# Patient Record
Sex: MALE | Race: WHITE | NOT HISPANIC OR LATINO | Employment: OTHER | URBAN - METROPOLITAN AREA
[De-identification: names, ages, dates, MRNs, and addresses within clinical notes are randomized per-mention and may not be internally consistent; named-entity substitution may affect disease eponyms.]

---

## 2017-01-30 ENCOUNTER — RESOLUTE PROFESSIONAL BILLING HOSPITAL PROF FEE (OUTPATIENT)
Dept: HOSPITALIST | Facility: MEDICAL CENTER | Age: 62
End: 2017-01-30
Payer: COMMERCIAL

## 2017-01-30 ENCOUNTER — APPOINTMENT (OUTPATIENT)
Dept: RADIOLOGY | Facility: MEDICAL CENTER | Age: 62
End: 2017-01-30
Attending: EMERGENCY MEDICINE
Payer: COMMERCIAL

## 2017-01-30 ENCOUNTER — HOSPITAL ENCOUNTER (OUTPATIENT)
Facility: MEDICAL CENTER | Age: 62
End: 2017-01-31
Attending: EMERGENCY MEDICINE | Admitting: INTERNAL MEDICINE
Payer: COMMERCIAL

## 2017-01-30 DIAGNOSIS — R07.9 CHEST PAIN, UNSPECIFIED TYPE: ICD-10-CM

## 2017-01-30 PROBLEM — I25.10 CAD (CORONARY ARTERY DISEASE): Status: ACTIVE | Noted: 2017-01-30

## 2017-01-30 PROBLEM — E11.9 DM (DIABETES MELLITUS) (HCC): Status: ACTIVE | Noted: 2017-01-30

## 2017-01-30 PROBLEM — N40.0 BPH (BENIGN PROSTATIC HYPERTROPHY): Status: ACTIVE | Noted: 2017-01-30

## 2017-01-30 LAB
ALBUMIN SERPL BCP-MCNC: 4.5 G/DL (ref 3.2–4.9)
ALBUMIN/GLOB SERPL: 1.6 G/DL
ALP SERPL-CCNC: 35 U/L (ref 30–99)
ALT SERPL-CCNC: 57 U/L (ref 2–50)
ANION GAP SERPL CALC-SCNC: 10 MMOL/L (ref 0–11.9)
APTT PPP: 24 SEC (ref 24.7–36)
AST SERPL-CCNC: 25 U/L (ref 12–45)
BASOPHILS # BLD AUTO: 1.2 % (ref 0–1.8)
BASOPHILS # BLD: 0.08 K/UL (ref 0–0.12)
BILIRUB SERPL-MCNC: 0.8 MG/DL (ref 0.1–1.5)
BNP SERPL-MCNC: 20 PG/ML (ref 0–100)
BUN SERPL-MCNC: 21 MG/DL (ref 8–22)
CALCIUM SERPL-MCNC: 9.4 MG/DL (ref 8.5–10.5)
CHLORIDE SERPL-SCNC: 103 MMOL/L (ref 96–112)
CO2 SERPL-SCNC: 22 MMOL/L (ref 20–33)
CREAT SERPL-MCNC: 0.79 MG/DL (ref 0.5–1.4)
DEPRECATED D DIMER PPP IA-ACNC: <200 NG/ML(D-DU)
EKG IMPRESSION: NORMAL
EOSINOPHIL # BLD AUTO: 0.18 K/UL (ref 0–0.51)
EOSINOPHIL NFR BLD: 2.7 % (ref 0–6.9)
ERYTHROCYTE [DISTWIDTH] IN BLOOD BY AUTOMATED COUNT: 43.4 FL (ref 35.9–50)
GFR SERPL CREATININE-BSD FRML MDRD: >60 ML/MIN/1.73 M 2
GLOBULIN SER CALC-MCNC: 2.9 G/DL (ref 1.9–3.5)
GLUCOSE BLD-MCNC: 174 MG/DL (ref 65–99)
GLUCOSE BLD-MCNC: 184 MG/DL (ref 65–99)
GLUCOSE BLD-MCNC: 192 MG/DL (ref 65–99)
GLUCOSE SERPL-MCNC: 178 MG/DL (ref 65–99)
HCT VFR BLD AUTO: 45.9 % (ref 42–52)
HGB BLD-MCNC: 15.4 G/DL (ref 14–18)
IMM GRANULOCYTES # BLD AUTO: 0.04 K/UL (ref 0–0.11)
IMM GRANULOCYTES NFR BLD AUTO: 0.6 % (ref 0–0.9)
INR PPP: 0.92 (ref 0.87–1.13)
LIPASE SERPL-CCNC: 15 U/L (ref 11–82)
LYMPHOCYTES # BLD AUTO: 2.69 K/UL (ref 1–4.8)
LYMPHOCYTES NFR BLD: 40.7 % (ref 22–41)
MCH RBC QN AUTO: 29.8 PG (ref 27–33)
MCHC RBC AUTO-ENTMCNC: 33.6 G/DL (ref 33.7–35.3)
MCV RBC AUTO: 88.8 FL (ref 81.4–97.8)
MONOCYTES # BLD AUTO: 0.67 K/UL (ref 0–0.85)
MONOCYTES NFR BLD AUTO: 10.1 % (ref 0–13.4)
NEUTROPHILS # BLD AUTO: 2.95 K/UL (ref 1.82–7.42)
NEUTROPHILS NFR BLD: 44.7 % (ref 44–72)
NRBC # BLD AUTO: 0 K/UL
NRBC BLD AUTO-RTO: 0 /100 WBC
PLATELET # BLD AUTO: 220 K/UL (ref 164–446)
PMV BLD AUTO: 10.3 FL (ref 9–12.9)
POTASSIUM SERPL-SCNC: 4.2 MMOL/L (ref 3.6–5.5)
PROT SERPL-MCNC: 7.4 G/DL (ref 6–8.2)
PROTHROMBIN TIME: 12.6 SEC (ref 12–14.6)
RBC # BLD AUTO: 5.17 M/UL (ref 4.7–6.1)
SODIUM SERPL-SCNC: 135 MMOL/L (ref 135–145)
TROPONIN I SERPL-MCNC: <0.01 NG/ML (ref 0–0.04)
WBC # BLD AUTO: 6.6 K/UL (ref 4.8–10.8)

## 2017-01-30 PROCEDURE — 83690 ASSAY OF LIPASE: CPT

## 2017-01-30 PROCEDURE — 700105 HCHG RX REV CODE 258: Performed by: EMERGENCY MEDICINE

## 2017-01-30 PROCEDURE — 85730 THROMBOPLASTIN TIME PARTIAL: CPT

## 2017-01-30 PROCEDURE — A9270 NON-COVERED ITEM OR SERVICE: HCPCS | Performed by: INTERNAL MEDICINE

## 2017-01-30 PROCEDURE — 85025 COMPLETE CBC W/AUTO DIFF WBC: CPT

## 2017-01-30 PROCEDURE — 90471 IMMUNIZATION ADMIN: CPT

## 2017-01-30 PROCEDURE — 36415 COLL VENOUS BLD VENIPUNCTURE: CPT

## 2017-01-30 PROCEDURE — 84484 ASSAY OF TROPONIN QUANT: CPT | Mod: 91

## 2017-01-30 PROCEDURE — 80053 COMPREHEN METABOLIC PANEL: CPT

## 2017-01-30 PROCEDURE — 93010 ELECTROCARDIOGRAM REPORT: CPT | Performed by: INTERNAL MEDICINE

## 2017-01-30 PROCEDURE — 93005 ELECTROCARDIOGRAM TRACING: CPT | Performed by: INTERNAL MEDICINE

## 2017-01-30 PROCEDURE — A9270 NON-COVERED ITEM OR SERVICE: HCPCS | Performed by: EMERGENCY MEDICINE

## 2017-01-30 PROCEDURE — 96361 HYDRATE IV INFUSION ADD-ON: CPT

## 2017-01-30 PROCEDURE — 700102 HCHG RX REV CODE 250 W/ 637 OVERRIDE(OP): Performed by: EMERGENCY MEDICINE

## 2017-01-30 PROCEDURE — 96372 THER/PROPH/DIAG INJ SC/IM: CPT

## 2017-01-30 PROCEDURE — 93005 ELECTROCARDIOGRAM TRACING: CPT | Performed by: EMERGENCY MEDICINE

## 2017-01-30 PROCEDURE — 96376 TX/PRO/DX INJ SAME DRUG ADON: CPT

## 2017-01-30 PROCEDURE — 85379 FIBRIN DEGRADATION QUANT: CPT

## 2017-01-30 PROCEDURE — 99285 EMERGENCY DEPT VISIT HI MDM: CPT

## 2017-01-30 PROCEDURE — G0378 HOSPITAL OBSERVATION PER HR: HCPCS

## 2017-01-30 PROCEDURE — 90732 PPSV23 VACC 2 YRS+ SUBQ/IM: CPT | Performed by: INTERNAL MEDICINE

## 2017-01-30 PROCEDURE — 96374 THER/PROPH/DIAG INJ IV PUSH: CPT

## 2017-01-30 PROCEDURE — 99220 PR INITIAL OBSERVATION CARE,LEVL III: CPT | Performed by: INTERNAL MEDICINE

## 2017-01-30 PROCEDURE — 83880 ASSAY OF NATRIURETIC PEPTIDE: CPT

## 2017-01-30 PROCEDURE — 700111 HCHG RX REV CODE 636 W/ 250 OVERRIDE (IP): Performed by: INTERNAL MEDICINE

## 2017-01-30 PROCEDURE — 700102 HCHG RX REV CODE 250 W/ 637 OVERRIDE(OP): Performed by: INTERNAL MEDICINE

## 2017-01-30 PROCEDURE — 96360 HYDRATION IV INFUSION INIT: CPT

## 2017-01-30 PROCEDURE — 82962 GLUCOSE BLOOD TEST: CPT | Mod: 91

## 2017-01-30 PROCEDURE — 71010 DX-CHEST-PORTABLE (1 VIEW): CPT

## 2017-01-30 PROCEDURE — 85610 PROTHROMBIN TIME: CPT

## 2017-01-30 RX ORDER — LORAZEPAM 1 MG/1
0.5 TABLET ORAL EVERY 6 HOURS PRN
Status: DISCONTINUED | OUTPATIENT
Start: 2017-01-30 | End: 2017-01-31 | Stop reason: HOSPADM

## 2017-01-30 RX ORDER — IBUPROFEN 800 MG/1
800 TABLET ORAL
Status: DISCONTINUED | OUTPATIENT
Start: 2017-01-30 | End: 2017-01-31 | Stop reason: HOSPADM

## 2017-01-30 RX ORDER — LABETALOL HYDROCHLORIDE 5 MG/ML
10 INJECTION, SOLUTION INTRAVENOUS EVERY 4 HOURS PRN
Status: DISCONTINUED | OUTPATIENT
Start: 2017-01-30 | End: 2017-01-31 | Stop reason: HOSPADM

## 2017-01-30 RX ORDER — MORPHINE SULFATE 4 MG/ML
2-4 INJECTION, SOLUTION INTRAMUSCULAR; INTRAVENOUS
Status: DISPENSED | OUTPATIENT
Start: 2017-01-30 | End: 2017-01-31

## 2017-01-30 RX ORDER — CALCIUM CARBONATE 500 MG/1
500 TABLET, CHEWABLE ORAL 3 TIMES DAILY PRN
Status: DISCONTINUED | OUTPATIENT
Start: 2017-01-30 | End: 2017-01-31 | Stop reason: HOSPADM

## 2017-01-30 RX ORDER — ASPIRIN 300 MG/1
300 SUPPOSITORY RECTAL DAILY
Status: DISCONTINUED | OUTPATIENT
Start: 2017-01-31 | End: 2017-01-31 | Stop reason: HOSPADM

## 2017-01-30 RX ORDER — AMOXICILLIN 250 MG
1 CAPSULE ORAL
Status: DISCONTINUED | OUTPATIENT
Start: 2017-01-31 | End: 2017-01-31 | Stop reason: HOSPADM

## 2017-01-30 RX ORDER — PROMETHAZINE HYDROCHLORIDE 25 MG/1
12.5-25 TABLET ORAL EVERY 4 HOURS PRN
Status: DISCONTINUED | OUTPATIENT
Start: 2017-01-30 | End: 2017-01-31 | Stop reason: HOSPADM

## 2017-01-30 RX ORDER — M-VIT,TX,IRON,MINS/CALC/FOLIC 27MG-0.4MG
1 TABLET ORAL DAILY
Status: SHIPPED | COMMUNITY
End: 2023-12-21

## 2017-01-30 RX ORDER — ONDANSETRON 2 MG/ML
4 INJECTION INTRAMUSCULAR; INTRAVENOUS EVERY 4 HOURS PRN
Status: DISCONTINUED | OUTPATIENT
Start: 2017-01-30 | End: 2017-01-31 | Stop reason: HOSPADM

## 2017-01-30 RX ORDER — BISACODYL 10 MG
10 SUPPOSITORY, RECTAL RECTAL
Status: DISCONTINUED | OUTPATIENT
Start: 2017-01-31 | End: 2017-01-31 | Stop reason: HOSPADM

## 2017-01-30 RX ORDER — HYDROCODONE BITARTRATE AND ACETAMINOPHEN 5; 325 MG/1; MG/1
1-2 TABLET ORAL EVERY 4 HOURS PRN
COMMUNITY
End: 2019-03-08

## 2017-01-30 RX ORDER — HEPARIN SODIUM 5000 [USP'U]/ML
5000 INJECTION, SOLUTION INTRAVENOUS; SUBCUTANEOUS EVERY 8 HOURS
Status: DISCONTINUED | OUTPATIENT
Start: 2017-01-31 | End: 2017-01-31 | Stop reason: HOSPADM

## 2017-01-30 RX ORDER — ACETAMINOPHEN 325 MG/1
650 TABLET ORAL EVERY 6 HOURS PRN
Status: DISCONTINUED | OUTPATIENT
Start: 2017-01-30 | End: 2017-01-31 | Stop reason: HOSPADM

## 2017-01-30 RX ORDER — ASPIRIN 325 MG
325 TABLET ORAL DAILY
Status: DISCONTINUED | OUTPATIENT
Start: 2017-01-31 | End: 2017-01-31 | Stop reason: HOSPADM

## 2017-01-30 RX ORDER — ENEMA 19; 7 G/133ML; G/133ML
1 ENEMA RECTAL
Status: DISCONTINUED | OUTPATIENT
Start: 2017-01-31 | End: 2017-01-31 | Stop reason: HOSPADM

## 2017-01-30 RX ORDER — SODIUM CHLORIDE 9 MG/ML
INJECTION, SOLUTION INTRAVENOUS CONTINUOUS
Status: DISCONTINUED | OUTPATIENT
Start: 2017-01-30 | End: 2017-01-30

## 2017-01-30 RX ORDER — TAMSULOSIN HYDROCHLORIDE 0.4 MG/1
0.4 CAPSULE ORAL
Status: DISCONTINUED | OUTPATIENT
Start: 2017-01-30 | End: 2017-01-31 | Stop reason: HOSPADM

## 2017-01-30 RX ORDER — NAPROXEN SODIUM 220 MG
440 TABLET ORAL PRN
Status: ON HOLD | COMMUNITY
End: 2017-01-31

## 2017-01-30 RX ORDER — CHLORAL HYDRATE 500 MG
1000 CAPSULE ORAL
Status: SHIPPED | COMMUNITY
End: 2023-12-21

## 2017-01-30 RX ORDER — TAMSULOSIN HYDROCHLORIDE 0.4 MG/1
0.4 CAPSULE ORAL
COMMUNITY

## 2017-01-30 RX ORDER — ONDANSETRON 4 MG/1
4 TABLET, ORALLY DISINTEGRATING ORAL EVERY 4 HOURS PRN
Status: DISCONTINUED | OUTPATIENT
Start: 2017-01-30 | End: 2017-01-31 | Stop reason: HOSPADM

## 2017-01-30 RX ORDER — AMOXICILLIN 250 MG
1 CAPSULE ORAL NIGHTLY
Status: DISCONTINUED | OUTPATIENT
Start: 2017-01-31 | End: 2017-01-31 | Stop reason: HOSPADM

## 2017-01-30 RX ORDER — ASPIRIN 81 MG/1
324 TABLET, CHEWABLE ORAL ONCE
Status: COMPLETED | OUTPATIENT
Start: 2017-01-30 | End: 2017-01-30

## 2017-01-30 RX ORDER — ASPIRIN 81 MG/1
324 TABLET, CHEWABLE ORAL DAILY
Status: DISCONTINUED | OUTPATIENT
Start: 2017-01-31 | End: 2017-01-31 | Stop reason: HOSPADM

## 2017-01-30 RX ORDER — CALCIUM CARBONATE 500 MG/1
500 TABLET, CHEWABLE ORAL PRN
COMMUNITY

## 2017-01-30 RX ORDER — NITROGLYCERIN 0.4 MG/1
0.4 TABLET SUBLINGUAL
Status: DISCONTINUED | OUTPATIENT
Start: 2017-01-30 | End: 2017-01-31 | Stop reason: HOSPADM

## 2017-01-30 RX ORDER — LORAZEPAM 2 MG/ML
0.5 INJECTION INTRAMUSCULAR EVERY 6 HOURS PRN
Status: DISCONTINUED | OUTPATIENT
Start: 2017-01-30 | End: 2017-01-31 | Stop reason: HOSPADM

## 2017-01-30 RX ORDER — LACTULOSE 20 G/30ML
30 SOLUTION ORAL
Status: DISCONTINUED | OUTPATIENT
Start: 2017-01-31 | End: 2017-01-31 | Stop reason: HOSPADM

## 2017-01-30 RX ORDER — HYDROCODONE BITARTRATE AND ACETAMINOPHEN 5; 325 MG/1; MG/1
1-2 TABLET ORAL EVERY 4 HOURS PRN
Status: DISCONTINUED | OUTPATIENT
Start: 2017-01-30 | End: 2017-01-31 | Stop reason: HOSPADM

## 2017-01-30 RX ORDER — PROMETHAZINE HYDROCHLORIDE 25 MG/1
12.5-25 SUPPOSITORY RECTAL EVERY 4 HOURS PRN
Status: DISCONTINUED | OUTPATIENT
Start: 2017-01-30 | End: 2017-01-31 | Stop reason: HOSPADM

## 2017-01-30 RX ORDER — DOCUSATE SODIUM 100 MG/1
100 CAPSULE, LIQUID FILLED ORAL EVERY MORNING
Status: DISCONTINUED | OUTPATIENT
Start: 2017-01-31 | End: 2017-01-31 | Stop reason: HOSPADM

## 2017-01-30 RX ORDER — DEXTROSE MONOHYDRATE 25 G/50ML
25 INJECTION, SOLUTION INTRAVENOUS
Status: DISCONTINUED | OUTPATIENT
Start: 2017-01-30 | End: 2017-01-31 | Stop reason: HOSPADM

## 2017-01-30 RX ORDER — NITROGLYCERIN 0.4 MG/1
0.4 TABLET SUBLINGUAL ONCE
Status: COMPLETED | OUTPATIENT
Start: 2017-01-30 | End: 2017-01-30

## 2017-01-30 RX ADMIN — NITROGLYCERIN 0.4 MG: 0.4 TABLET SUBLINGUAL at 10:15

## 2017-01-30 RX ADMIN — ASPIRIN 324 MG: 81 TABLET, CHEWABLE ORAL at 10:15

## 2017-01-30 RX ADMIN — SODIUM CHLORIDE: 9 INJECTION, SOLUTION INTRAVENOUS at 10:17

## 2017-01-30 RX ADMIN — TAMSULOSIN HYDROCHLORIDE 0.4 MG: 0.4 CAPSULE ORAL at 20:16

## 2017-01-30 RX ADMIN — LIDOCAINE HYDROCHLORIDE 15 ML: 20 SOLUTION OROPHARYNGEAL at 13:37

## 2017-01-30 RX ADMIN — PNEUMOCOCCAL VACCINE POLYVALENT 25 MCG
25; 25; 25; 25; 25; 25; 25; 25; 25; 25; 25; 25; 25; 25; 25; 25; 25; 25; 25; 25; 25; 25; 25 INJECTION, SOLUTION INTRAMUSCULAR; SUBCUTANEOUS at 16:04

## 2017-01-30 RX ADMIN — INSULIN LISPRO 2 UNITS: 100 INJECTION, SOLUTION INTRAVENOUS; SUBCUTANEOUS at 17:31

## 2017-01-30 RX ADMIN — IBUPROFEN 800 MG: 800 TABLET, FILM COATED ORAL at 17:31

## 2017-01-30 RX ADMIN — HYDROCODONE BITARTRATE AND ACETAMINOPHEN 2 TABLET: 5; 325 TABLET ORAL at 20:29

## 2017-01-30 RX ADMIN — MORPHINE SULFATE 2 MG: 4 INJECTION INTRAVENOUS at 13:45

## 2017-01-30 RX ADMIN — MORPHINE SULFATE 4 MG: 4 INJECTION INTRAVENOUS at 21:50

## 2017-01-30 ASSESSMENT — PAIN SCALES - GENERAL
PAINLEVEL_OUTOF10: 4
PAINLEVEL_OUTOF10: 6
PAINLEVEL_OUTOF10: 6
PAINLEVEL_OUTOF10: 4
PAINLEVEL_OUTOF10: 6
PAINLEVEL_OUTOF10: 4
PAINLEVEL_OUTOF10: 0
PAINLEVEL_OUTOF10: 4
PAINLEVEL_OUTOF10: 4

## 2017-01-30 ASSESSMENT — LIFESTYLE VARIABLES
ALCOHOL_USE: NO
EVER_SMOKED: NEVER

## 2017-01-30 ASSESSMENT — PAIN SCALES - WONG BAKER
WONGBAKER_NUMERICALRESPONSE: DOESN'T HURT AT ALL
WONGBAKER_NUMERICALRESPONSE: DOESN'T HURT AT ALL

## 2017-01-30 NOTE — ED NOTES
Bed assigned to T 202.  Report given to Shoshana DINERO.  Pt given lunch box meal.  Aware no caffeine/no chocolate and NPO after midnight.

## 2017-01-30 NOTE — PROGRESS NOTES
Patient up on the floor, ambulatory. Still have some chest pain when moving around. No shortness of breath. Discuss about plan of care. Stress test tomorrow.

## 2017-01-30 NOTE — IP AVS SNAPSHOT
" After Visit Summary                                                                                                                  Name:Hossein Anderson  Medical Record Number:[id number]  CSN: [id number]    YOB: 1955   Age: 61 y.o.  Sex: male  HT:1.803 m (5' 11\") WT: 116.6 kg (257 lb 0.9 oz)          Admit Date: 1/30/2017     Discharge Date:   Today's Date: 1/31/2017  Attending Doctor:  Dorys Jaquez M.D.                  Allergies:  Codeine and Orange            Discharge Instructions       Discharge Instructions    Discharged to home by car with relative. Discharged via walking, hospital escort: Yes.  Special equipment needed: Not Applicable    Be sure to schedule a follow-up appointment with your primary care doctor or any specialists as instructed.     Discharge Plan:   Diet Plan: Discussed  Activity Level: Discussed  Confirmed Follow up Appointment: Patient to Call and Schedule Appointment  Confirmed Symptoms Management: Discussed  Medication Reconciliation Updated: Yes  Pneumococcal Vaccine Given - only chart on this line when given: Given (See MAR)  Influenza Vaccine Indication: Not indicated: Previously immunized this influenza season and > 8 years of age    I understand that a diet low in cholesterol, fat, and sodium is recommended for good health. Unless I have been given specific instructions below for another diet, I accept this instruction as my diet prescription.   Other diet: Diabetic Diet    Special Instructions: None    · Is patient discharged on Warfarin / Coumadin?   No     · Is patient Post Blood Transfusion?  No    Depression / Suicide Risk    As you are discharged from this [facility] facility, it is important to learn how to keep safe from harming yourself.    Recognize the warning signs:  · Abrupt changes in personality, positive or negative- including increase in energy   · Giving away possessions  · Change in eating patterns- significant weight changes-  positive or " negative  · Change in sleeping patterns- unable to sleep or sleeping all the time   · Unwillingness or inability to communicate  · Depression  · Unusual sadness, discouragement and loneliness  · Talk of wanting to die  · Neglect of personal appearance   · Rebelliousness- reckless behavior  · Withdrawal from people/activities they love  · Confusion- inability to concentrate     If you or a loved one observes any of these behaviors or has concerns about self-harm, here's what you can do:  · Talk about it- your feelings and reasons for harming yourself  · Remove any means that you might use to hurt yourself (examples: pills, rope, extension cords, firearm)  · Get professional help from the community (Mental Health, Substance Abuse, psychological counseling)  · Do not be alone:Call your Safe Contact- someone whom you trust who will be there for you.  · Call your local CRISIS HOTLINE 233-3269 or 792-190-8242  · Call your local Children's Mobile Crisis Response Team [place] (139) 709-0761 or wwwCodeEval  · Call the toll free National Suicide Prevention Hotlines   · National Suicide Prevention Lifeline 524-335-OHGF (3759)  · National Hope Line Network 800-SUICIDE (002-1041)        Follow-up Information     1. Schedule an appointment as soon as possible for a visit with Branden ROJAS M.D..    Specialty:  Family Medicine    Contact information    [address] [place] [address] [phone]           Discharge Medication Instructions:    Below are the medications your physician expects you to take upon discharge:    Review all your home medications and newly ordered medications with your doctor and/or pharmacist. Follow medication instructions as directed by your doctor and/or pharmacist.    Please keep your medication list with you and share with your physician.               Medication List      START taking these medications        Instructions    ibuprofen 800 MG Tabs   Last time this was  given:  800 mg on 1/31/2017  1:00 PM   Commonly known as:  MOTRIN    Take 1 Tab by mouth 3 times a day, with meals for 5 days.   Dose:  800 mg       nitroglycerin 0.4 MG Subl   Last time this was given:  0.4 mg on 1/30/2017 10:15 AM   Commonly known as:  NITROSTAT    Place 1 Tab under tongue as needed for Chest Pain.   Dose:  0.4 mg       tramadol 50 MG Tabs   Commonly known as:  ULTRAM    Take 1 Tab by mouth every 6 hours as needed for Moderate Pain.   Dose:  50 mg         CONTINUE taking these medications        Instructions    aspirin EC 81 MG Tbec   Commonly known as:  ECOTRIN    Take 81 mg by mouth every bedtime.   Dose:  81 mg       calcium carbonate 500 MG Chew   Commonly known as:  TUMS    Take 500 mg by mouth as needed (For upset stomach).   Dose:  500 mg       fish oil 1000 MG Caps capsule    Take 1,000 mg by mouth every bedtime.   Dose:  1000 mg       GLUCOSAMINE CHONDROITIN JOINT PO    Take 1 Tab by mouth every day.   Dose:  1 Tab       hydrocodone-acetaminophen 5-325 MG Tabs per tablet   Last time this was given:  1 Tab on 1/31/2017  9:34 AM   Commonly known as:  NORCO    Take 1-2 Tabs by mouth every four hours as needed. Indications: Moderate to Moderately Severe Pain   Dose:  1-2 Tab       metformin 1000 MG tablet   Commonly known as:  GLUCOPHAGE    Take 1,000 mg by mouth 2 times a day, with meals.   Dose:  1000 mg       tamsulosin 0.4 MG capsule   Last time this was given:  0.4 mg on 1/30/2017  8:16 PM   Commonly known as:  FLOMAX    Take 0.4 mg by mouth every bedtime.   Dose:  0.4 mg       therapeutic multivitamin-minerals Tabs    Take 1 Tab by mouth every day.   Dose:  1 Tab         STOP taking these medications     ALEVE 220 MG tablet   Generic drug:  naproxen               Instructions           Diet / Nutrition:    Follow any diet instructions given to you by your doctor or the dietician, including how much salt (sodium) you are allowed each day.    If you are overweight, talk to your doctor  about a weight reduction plan.    Activity:    Remain physically active following your doctor's instructions about exercise and activity.    Rest often.     Any time you become even a little tired or short of breath, SIT DOWN and rest.    Worsening Symptoms:    Report any of the following signs and symptoms to the doctor's office immediately:    *Pain of jaw, arm, or neck  *Chest pain not relieved by medication                               *Dizziness or loss of consciousness  *Difficulty breathing even when at rest   *More tired than usual                                       *Bleeding drainage or swelling of surgical site  *Swelling of feet, ankles, legs or stomach                 *Fever (>100ºF)  *Pink or blood tinged sputum  *Weight gain (3lbs/day or 5lbs /week)           *Shock from internal defibrillator (if applicable)  *Palpitations or irregular heartbeats                *Cool and/or numb extremities    Stroke Awareness    Common Risk Factors for Stroke include:    Age  Atrial Fibrillation  Carotid Artery Stenosis  Diabetes Mellitus  Excessive alcohol consumption  High blood pressure  Overweight   Physical inactivity  Smoking    Warning signs and symptoms of a stroke include:    *Sudden numbness or weakness of the face, arm or leg (especially on one side of the body).  *Sudden confusion, trouble speaking or understanding.  *Sudden trouble seeing in one or both eyes.  *Sudden trouble walking, dizziness, loss of balance or coordination.Sudden severe headache with no known cause.    It is very important to get treatment quickly when a stroke occurs. If you experience any of the above warning signs, call 911 immediately.                   Disclaimer         Quit Smoking / Tobacco Use:    I understand the use of any tobacco products increases my chance of suffering from future heart disease or stroke and could cause other illnesses which may shorten my life. Quitting the use of tobacco products is the single  most important thing I can do to improve my health. For further information on smoking / tobacco cessation call a Toll Free Quit Line at 1-662.845.2393 (*National Cancer [place]) or 1-568.262.4194 (American Lung Association) or you can access the web based program at www.lungusa.org.    [place] Tobacco Users Help Line:  (526) 232-7302       Toll Free: 1-232.700.8937  Quit Tobacco Program [facility] (875)455-2516    Crisis Hotline:    [place] Crisis Hotline:  4-369-CTWMIAD or 1-892.667.4961    [place] Crisis Hotline:    1-488.714.4959 or 158-345-7848    Discharge Survey:   Thank you for choosing [facility]. We hope we did everything we could to make your hospital stay a pleasant one. You may be receiving a phone survey and we would appreciate your time and participation in answering the questions. Your input is very valuable to us in our efforts to improve our service to our patients and their families.        My signature on this form indicates that:    1. I have reviewed and understand the above information.  2. My questions regarding this information have been answered to my satisfaction.  3. I have formulated a plan with my discharge nurse to obtain my prescribed medications for home.                  Disclaimer         __________________________________                     __________       ________                       Patient Signature                                                 Date                    Time

## 2017-01-30 NOTE — ED NOTES
Amb to triage w/ c/o pin point sharp/stabbing chest pain since yesterday.  Pt states he has some pain radiating to his RUE today but this has since resolved.  Denies sob.  PWD.

## 2017-01-30 NOTE — ED NOTES
Pt to [facility] via w/c accompanied by spouse.  Pt reports pain worsens with side-to-side bending and lying on his side.  Pt has taken an aleve which he reports has improved his pain.  Pt into a gown, up for ERP evaluation.

## 2017-01-30 NOTE — ED NOTES
"Med rec updated and complete  Allergies reviewed  Pts wife had medications list at bedside, went over list and returned list back to pts wife.  Pt states \"No antibiotics in the last 30 days\".    "

## 2017-01-30 NOTE — ED PROVIDER NOTES
ED Provider Note  CHIEF COMPLAINT  Chief Complaint   Patient presents with   • Chest Pain       HPI  Hossein Anderson is a 61 y.o. male who presents with right-sided chest pain, since yesterday morning, severe pain dull, somewhat better when he is laying down somewhat worse with motion. Feels like a knife stabbing him when he moves to the right of the left no shortness of breath did have some nausea and no diaphoresis. Pain radiates to his right arm. No history of similar episodes. He has had myocardial infarction ×2 with different pain. With his myocardial infarction pain was mid chest, pressure, like an elephant sitting on his chest. No fever no chills no nausea no vomiting. Did not take aspirin or nitroglycerin.    REVIEW OF SYSTEMS  See HPI for further details. History of myocardial infarction ×2, diabetes, leukemia, Denies other G.I., G.U.. endrocine, cardiovascular, respriatory or neurological problems. All other systems are negative.     PAST MEDICAL HISTORY  No past medical history on file.    FAMILY HISTORY  No family history on file. negative for heart disease     SOCIAL HISTORY he is a nonsmoker  Social History     Social History   • Marital Status:      Spouse Name: N/A   • Number of Children: N/A   • Years of Education: N/A     Social History Main Topics   • Smoking status: Not on file   • Smokeless tobacco: Not on file   • Alcohol Use: Not on file   • Drug Use: Not on file   • Sexual Activity: Not on file     Other Topics Concern   • Not on file     Social History Narrative   • No narrative on file       SURGICAL HISTORY  No past surgical history on file.    CURRENT MEDICATIONS  Home Medications     **Home medications have not yet been reviewed for this encounter**          ALLERGIES  No Known Allergies    PHYSICAL EXAM  VITAL SIGNS: /91 mmHg  Pulse 76  Temp(Src) 36.1 °C (97 °F)  Resp 10  Wt 116.1 kg (255 lb 15.3 oz)  SpO2 95%  Constitutional: Well developed, Well nourished,  slightly obese No acute distress, Non-toxic appearance.   HENT: Normocephalic, Atraumatic, Bilateral external ears normal, Oropharynx moist, No oral exudates, Nose normal.   Eyes: PERRL, EOMI, Conjunctiva normal, No discharge.   Neck: Normal range of motion, No tenderness, Supple, No stridor.   Lymphatic: No lymphadenopathy noted.   Cardiovascular: Normal heart rate, Normal rhythm, No murmurs, No rubs, No gallops.   Thorax & Lungs: Normal breath sounds, No respiratory distress, No wheezing, No chest tenderness.   Abdomen:  No tenderness, no guarding no rigidity and the abdomen is soft.  No masses, No pulsatile masses.  Skin: Warm, Dry, No erythema, No rash.   Back: No tenderness, No CVA tenderness.   Extremities: Intact distal pulses, No edema, No tenderness, No cyanosis, No clubbing.   Musculoskeletal: Good range of motion in all major joints. No tenderness to palpation or major deformities noted.   Neurologic: Alert & oriented x 3, Normal motor function, Normal sensory function, No focal deficits noted.   Psychiatric: Affect normal, Judgment normal, Mood normal.   EKG Interpretation    Interpreted by me    Rhythm: normal sinus   Rate: normal  Axis: normal  Ectopy: none  Conduction: normal  ST Segments: no acute change  T Waves: no acute change  Q Waves: none    Clinical Impression: I do not have an old EKG to compare to    RADIOLOGY/PROCEDURES      COURSE & MEDICAL DECISION MAKING  Pertinent Labs & Imaging studies reviewed. (See chart for details) d-dimer normal, electrolytes normal. Glucose elevated 178 renal function and liver function, lipase normal white count hematocrit platelets are normal,, clotting studies normal. BNP is normal, troponin normal    He is having chest pain, he has a history of myocardial infarction ×2 in the past. However pain is different this time. He is given aspirin and nitroglycerin. He had good relief with nitroglycerin.  His chest pain is different from what he has had in the past.  However he has known coronary vessel disease and pain improving with nitroglycerin. I will talk with the hospitalist about admission    FINAL IMPRESSION  1.   1. Chest pain, unspecified type        2.   3.     Disposition  I have talked with the hospitalist about admission  Electronically signed by: Robby Luciano, 1/30/2017 10:08 AM

## 2017-01-31 ENCOUNTER — APPOINTMENT (OUTPATIENT)
Dept: RADIOLOGY | Facility: MEDICAL CENTER | Age: 62
End: 2017-01-31
Attending: INTERNAL MEDICINE
Payer: COMMERCIAL

## 2017-01-31 VITALS
TEMPERATURE: 98 F | SYSTOLIC BLOOD PRESSURE: 115 MMHG | OXYGEN SATURATION: 94 % | HEIGHT: 71 IN | HEART RATE: 66 BPM | RESPIRATION RATE: 20 BRPM | BODY MASS INDEX: 35.99 KG/M2 | WEIGHT: 257.06 LBS | DIASTOLIC BLOOD PRESSURE: 77 MMHG

## 2017-01-31 PROBLEM — I25.10 CAD (CORONARY ARTERY DISEASE): Chronic | Status: ACTIVE | Noted: 2017-01-30

## 2017-01-31 PROBLEM — N40.0 BPH (BENIGN PROSTATIC HYPERTROPHY): Chronic | Status: ACTIVE | Noted: 2017-01-30

## 2017-01-31 PROBLEM — E11.9 DM (DIABETES MELLITUS) (HCC): Chronic | Status: ACTIVE | Noted: 2017-01-30

## 2017-01-31 LAB
EKG IMPRESSION: NORMAL
EKG IMPRESSION: NORMAL
GLUCOSE BLD-MCNC: 145 MG/DL (ref 65–99)
GLUCOSE BLD-MCNC: 224 MG/DL (ref 65–99)

## 2017-01-31 PROCEDURE — 700102 HCHG RX REV CODE 250 W/ 637 OVERRIDE(OP): Performed by: INTERNAL MEDICINE

## 2017-01-31 PROCEDURE — A9502 TC99M TETROFOSMIN: HCPCS

## 2017-01-31 PROCEDURE — 96376 TX/PRO/DX INJ SAME DRUG ADON: CPT

## 2017-01-31 PROCEDURE — A9270 NON-COVERED ITEM OR SERVICE: HCPCS | Performed by: INTERNAL MEDICINE

## 2017-01-31 PROCEDURE — 700111 HCHG RX REV CODE 636 W/ 250 OVERRIDE (IP)

## 2017-01-31 PROCEDURE — 82962 GLUCOSE BLOOD TEST: CPT | Mod: 91

## 2017-01-31 PROCEDURE — 700111 HCHG RX REV CODE 636 W/ 250 OVERRIDE (IP): Performed by: INTERNAL MEDICINE

## 2017-01-31 PROCEDURE — 96372 THER/PROPH/DIAG INJ SC/IM: CPT

## 2017-01-31 PROCEDURE — 99217 PR OBSERVATION CARE DISCHARGE: CPT | Performed by: HOSPITALIST

## 2017-01-31 PROCEDURE — G0378 HOSPITAL OBSERVATION PER HR: HCPCS

## 2017-01-31 RX ORDER — NITROGLYCERIN 0.4 MG/1
0.4 TABLET SUBLINGUAL PRN
Qty: 25 TAB | Refills: 0 | Status: SHIPPED | OUTPATIENT
Start: 2017-01-31

## 2017-01-31 RX ORDER — TRAMADOL HYDROCHLORIDE 50 MG/1
50 TABLET ORAL EVERY 6 HOURS PRN
Qty: 20 TAB | Refills: 0 | Status: SHIPPED | OUTPATIENT
Start: 2017-01-31 | End: 2019-03-08

## 2017-01-31 RX ORDER — IBUPROFEN 800 MG/1
800 TABLET ORAL
Qty: 15 TAB | Refills: 0 | Status: SHIPPED | OUTPATIENT
Start: 2017-01-31 | End: 2017-02-05

## 2017-01-31 RX ORDER — REGADENOSON 0.08 MG/ML
INJECTION, SOLUTION INTRAVENOUS
Status: COMPLETED
Start: 2017-01-31 | End: 2017-01-31

## 2017-01-31 RX ADMIN — INSULIN LISPRO 3 UNITS: 100 INJECTION, SOLUTION INTRAVENOUS; SUBCUTANEOUS at 12:07

## 2017-01-31 RX ADMIN — IBUPROFEN 800 MG: 800 TABLET, FILM COATED ORAL at 13:00

## 2017-01-31 RX ADMIN — HYDROCODONE BITARTRATE AND ACETAMINOPHEN 1 TABLET: 5; 325 TABLET ORAL at 09:34

## 2017-01-31 RX ADMIN — ASPIRIN 325 MG: 325 TABLET, COATED ORAL at 09:34

## 2017-01-31 RX ADMIN — REGADENOSON 0.4 MG: 0.08 INJECTION, SOLUTION INTRAVENOUS at 08:29

## 2017-01-31 RX ADMIN — IBUPROFEN 800 MG: 800 TABLET, FILM COATED ORAL at 09:34

## 2017-01-31 RX ADMIN — MORPHINE SULFATE 4 MG: 4 INJECTION INTRAVENOUS at 05:01

## 2017-01-31 RX ADMIN — HEPARIN SODIUM 5000 UNITS: 5000 INJECTION, SOLUTION INTRAVENOUS; SUBCUTANEOUS at 04:59

## 2017-01-31 ASSESSMENT — PAIN SCALES - GENERAL
PAINLEVEL_OUTOF10: 0
PAINLEVEL_OUTOF10: 6
PAINLEVEL_OUTOF10: 2
PAINLEVEL_OUTOF10: 0

## 2017-01-31 ASSESSMENT — PAIN SCALES - WONG BAKER
WONGBAKER_NUMERICALRESPONSE: DOESN'T HURT AT ALL
WONGBAKER_NUMERICALRESPONSE: DOESN'T HURT AT ALL

## 2017-01-31 ASSESSMENT — LIFESTYLE VARIABLES: EVER_SMOKED: NEVER

## 2017-01-31 NOTE — PROGRESS NOTES
With complaints of chest pain earlier 8/10, medicated as scheduled. sr on tele, sb hr of 49 -no ectopy, to continue to monitor.

## 2017-01-31 NOTE — H&P
ATTENDING:  [facility] hospitalist.    PRIMARY CARE PHYSICIAN:  Branden Rooney MD    CODE STATUS:  Full code.    CHIEF COMPLAINT:  Chest pain.    HISTORY OF PRESENT ILLNESS:  This is a 61-year-old male who presented to the   emergency department due to chest pain.  Patient states it started yesterday,   he woke up with it, was located just right of his sternum, stabbing sensation,   which was worse with movement.  Patient states it is a 10/10 at its worse,   which is again during movement.  Patient does have a history of coronary   artery disease, last myocardial infarction was in 1999, prior myocardial   infarctions it felt like an elephant sitting on his chest.    PAST MEDICAL HISTORY:  1.  Diabetes.  2.  Coronary artery disease.  3.  History of leukemia.  4.  Benign prostatic hypertrophy.    PAST SURGICAL HISTORY:  1.  Right shoulder eye surgery.    MEDICATIONS:  1.  Aspirin 81 mg daily.  2.  Tums 500 mg p.r.n.  3.  Glucosamine and chondroitin daily.  4.  Hydrocodone/acetaminophen 5/325 1-2 tabs q. 4 hours p.r.n.  5.  Metformin 1000 mg b.i.d.  6.  Aleve 440 mg p.r.n.  7.  Omega-3 fatty acid 1000 mg daily.  8.  Flomax 0.4 mg daily.  9.  Multivitamin daily.    ALLERGIES:  CODEINE.    SOCIAL HISTORY:  Rarely smokes cigarettes, rarely drinks alcohol.  Denies any   illicit drug use.    FAMILY HISTORY:  Significant for diabetes and cancer.    REVIEW OF SYSTEMS:  Complete review of systems obtained with positives noted   in the HPI above, all others negative.    PHYSICAL EXAMINATION:  VITAL SIGNS:  Temperature 36.3, pulse 72, respirations 14, blood pressure   108/62, satting 93% on room air.  GENERAL:  No acute distress, alert and oriented x3.  HEENT:  Normocephalic, atraumatic, moist mucous membranes.  NECK:  No JVD, bruit, thyromegaly, cervical or supraclavicular adenopathy   noted.  CARDIOVASCULAR:  Regular rate and rhythm.  No murmurs, rubs or gallops.  Chest   wall, there is reproducible tenderness just right of his  sternum.  LUNGS:  Clear to auscultation bilaterally.  No crackles, rhonchi or wheezes.  ABDOMEN:  Bowel sounds x4, soft, nontender, nondistended, no   hepatosplenomegaly.  EXTREMITIES:  No clubbing, cyanosis or edema.  SKIN:  No rash or erythema.  NEUROLOGIC:  Cranial nerves II-XII intact.  Extraocular muscles intact.    LABORATORY DATA:  White count 6.6, hemoglobin 15.4, hematocrit 45.9, platelets   220.  Sodium 135, potassium 4.2, chloride 103, CO2 of 22, BUN 21, creatinine   0.7, glucose is 178.  Troponin negative.    IMAGING:  Chest x-ray showed no acute cardiopulmonary process.  EKG showed no   significant ST changes.    ASSESSMENT AND PLAN:  1.  Chest pain -- likely musculoskeletal, since it is reproducible, we will   start ibuprofen 800 mg t.i.d. scheduled.  The patient does have a significant   past medical history, has not had a stress test in quite some time, we will   trend troponins if they remain negative.  Obtain a stress test in the morning.    In the meantime, the patient will receive symptomatic care and close   monitoring on telemetry.  2.  Diabetes -- we will give insulin sliding scale and adjust as necessary.  3.  Coronary artery disease -- medical management as able.  4.  Benign prostatic hypertrophy -- continue home medication.       ____________________________________     DO IRMA MENDIETA / VIRGILIO    DD:  01/30/2017 16:30:07  DT:  01/30/2017 18:59:36    D#:  [id number]  Job#:  [id number]

## 2017-01-31 NOTE — PROGRESS NOTES
Blood sample send to Lab, Chest pain relieved from PRN medication given. Will give Pneumonia Vaccine.

## 2017-01-31 NOTE — PROGRESS NOTES
Received in bed, aox4, sr on monitor. Assessment as per CDU. Call light within reach. Needs attended. Plan of care discussed and understood.

## 2017-01-31 NOTE — DISCHARGE PLANNING
Care Transition Team Assessment    Information Source  Orientation : Oriented x 4  Who is responsible for making decisions for patient? : Patient  Source of Information: Patient  Primary Caregiver for Others?: No         Elopement Risk  Legal Hold: No  Ambulatory or Self Mobile in Wheelchair: No-Not an Elopement Risk  Disoriented: No  Psychiatric Symptoms: None  History of Wandering: No  Elopement this Admit: No  Vocalizing Wanting to Leave: No  Displays Behaviors, Body Language Wanting to Leave: No-Not at Risk for Elopement  Elopement Risk: Not at Risk for Elopement    Interdisciplinary Discharge Planning  Does Admitting Nurse Feel This Could be a Complex Discharge?: No  Primary Care Physician: RIANA AKHTAR   Lives with - Patient's Self Care Capacity: Spouse  Patient or legal guardian wants to designate a caregiver (see row info): No  Housing / Facility: [address]  Do You Take your Prescribed Medications Regularly: Yes  Able to Return to Previous ADL's: Yes  Mobility Issues: No  Prior Services: None  Patient Expects to be Discharged to:: home  Assistance Needed: No  Durable Medical Equipment: Not Applicable    Discharge Preparedness  [facility] resources needed?: None  What is your plan after discharge?: Home with help  Difficulity with ADLs: None  Difficulity with IADLs: None  Pharmacy: [facility] on [address]  Prescription Coverage: Yes    Functional Assesment  Prior Functional Level: Ambulatory, Drives Self, Independent with Activities of Daily Living, Independent with Medication Management    Finances  Medical Insurance Coverage: Private insurance    Vision / Hearing Impairment  Vision Impairment : Yes  Right Eye Vision: Wears Glasses  Left Eye Vision: Wears Glasses  Hearing Impairment : No    Values / Beliefs / Concerns  Values / Beliefs Concerns : No         Domestic Abuse  Have you ever been the victim of abuse or violence?: No  Physical Abuse or Sexual Abuse: No  Verbal Abuse or Emotional  Abuse: No  Possible Abuse Reported to:: Not Applicable         Discharge Risks or Barriers  Discharge risks or barriers?: No    Anticipated Discharge Information  Anticipated discharge disposition: Home  Discharge Address: [address] [place] [address] [place]  Discharge Contact Phone Number: Hanh Angulo [phone]

## 2017-01-31 NOTE — DISCHARGE INSTRUCTIONS
Discharge Instructions    Discharged to home by car with relative. Discharged via walking, hospital escort: Yes.  Special equipment needed: Not Applicable    Be sure to schedule a follow-up appointment with your primary care doctor or any specialists as instructed.     Discharge Plan:   Diet Plan: Discussed  Activity Level: Discussed  Confirmed Follow up Appointment: Patient to Call and Schedule Appointment  Confirmed Symptoms Management: Discussed  Medication Reconciliation Updated: Yes  Pneumococcal Vaccine Given - only chart on this line when given: Given (See MAR)  Influenza Vaccine Indication: Not indicated: Previously immunized this influenza season and > 8 years of age    I understand that a diet low in cholesterol, fat, and sodium is recommended for good health. Unless I have been given specific instructions below for another diet, I accept this instruction as my diet prescription.   Other diet: Diabetic Diet    Special Instructions: None    · Is patient discharged on Warfarin / Coumadin?   No     · Is patient Post Blood Transfusion?  No    Depression / Suicide Risk    As you are discharged from this [facility] facility, it is important to learn how to keep safe from harming yourself.    Recognize the warning signs:  · Abrupt changes in personality, positive or negative- including increase in energy   · Giving away possessions  · Change in eating patterns- significant weight changes-  positive or negative  · Change in sleeping patterns- unable to sleep or sleeping all the time   · Unwillingness or inability to communicate  · Depression  · Unusual sadness, discouragement and loneliness  · Talk of wanting to die  · Neglect of personal appearance   · Rebelliousness- reckless behavior  · Withdrawal from people/activities they love  · Confusion- inability to concentrate     If you or a loved one observes any of these behaviors or has concerns about self-harm, here's what you can do:  · Talk about it- your  feelings and reasons for harming yourself  · Remove any means that you might use to hurt yourself (examples: pills, rope, extension cords, firearm)  · Get professional help from the community (Mental Health, Substance Abuse, psychological counseling)  · Do not be alone:Call your Safe Contact- someone whom you trust who will be there for you.  · Call your local CRISIS HOTLINE 019-8637 or 078-036-9565  · Call your local Children's Mobile Crisis Response Team [place] (814) 490-5441 or www.Taigen  · Call the toll free National Suicide Prevention Hotlines   · National Suicide Prevention Lifeline 849-758-QZTS (2830)  · National Hope Line Network 800-SUICIDE (210-4565)

## 2017-01-31 NOTE — PROGRESS NOTES
[facility]    Admit:1/30/2017  Discharge:1/31/2017      Primary Care Physician: Branden ROJAS M.D.    Consults: none    CC:   Chief Complaint   Patient presents with   • Chest Pain         DISCHARGE DIAGNOSES:  Principal Problem:    Chest pain POA: Yes  Active Problems:    DM (diabetes mellitus) (CMS-HCC) (Chronic) POA: Yes    CAD (coronary artery disease) (Chronic) POA: Yes    BPH (benign prostatic hypertrophy) (Chronic) POA: Yes  Resolved Problems:    * No resolved hospital problems. *        PROCEDURES:  None      DIAGNOSTICS/STUDIES:  Interval history/exam for day of discharge:    Filed Vitals:    01/31/17 0005 01/31/17 0359 01/31/17 0701 01/31/17 1225   BP: 120/74 124/70 116/71 115/77   Pulse: 60 62 74 66   Temp: 36 °C (96.8 °F) 36.7 °C (98 °F) 36.7 °C (98.1 °F) 36.7 °C (98 °F)   Resp: 12 13 18 20   Height:       Weight:       SpO2: 91% 90%  94%     Weight/BMI: Body mass index is 35.87 kg/(m^2).  Pulse Oximetry: 94 %, O2 Delivery: None (Room Air)      Most Recent Labs:    Lab Results   Component Value Date/Time    WBC 6.6 01/30/2017 09:35 AM    RBC 5.17 01/30/2017 09:35 AM    HEMOGLOBIN 15.4 01/30/2017 09:35 AM    HEMATOCRIT 45.9 01/30/2017 09:35 AM    MCV 88.8 01/30/2017 09:35 AM    MCH 29.8 01/30/2017 09:35 AM    MCHC 33.6* 01/30/2017 09:35 AM    MPV 10.3 01/30/2017 09:35 AM    NEUTROPHILS-POLYS 44.70 01/30/2017 09:35 AM    LYMPHOCYTES 40.70 01/30/2017 09:35 AM    MONOCYTES 10.10 01/30/2017 09:35 AM    EOSINOPHILS 2.70 01/30/2017 09:35 AM    BASOPHILS 1.20 01/30/2017 09:35 AM      Lab Results   Component Value Date/Time    SODIUM 135 01/30/2017 09:35 AM    POTASSIUM 4.2 01/30/2017 09:35 AM    CHLORIDE 103 01/30/2017 09:35 AM    CO2 22 01/30/2017 09:35 AM    GLUCOSE 178* 01/30/2017 09:35 AM    BUN 21 01/30/2017 09:35 AM    CREATININE 0.79 01/30/2017 09:35 AM      Lab Results   Component Value Date/Time    ALT(SGPT) 57* 01/30/2017 09:35 AM    AST(SGOT) 25 01/30/2017 09:35  AM    ALKALINE PHOSPHATASE 35 01/30/2017 09:35 AM    TOTAL BILIRUBIN 0.8 01/30/2017 09:35 AM    LIPASE 15 01/30/2017 09:35 AM    ALBUMIN 4.5 01/30/2017 09:35 AM    GLOBULIN 2.9 01/30/2017 09:35 AM    INR 0.92 01/30/2017 09:35 AM     Lab Results   Component Value Date/Time    PT 12.6 01/30/2017 09:35 AM    INR 0.92 01/30/2017 09:35 AM        Imaging/ Testing:      NM-CARDIAC STRESS TEST   Final Result      DX-CHEST-PORTABLE (1 VIEW)   Final Result      No evidence of acute cardiopulmonary process.            DISCHARGE MEDICATIONS:     Medication List      START taking these medications       Instructions    ibuprofen 800 MG Tabs   Last time this was given:  800 mg on 1/31/2017  1:00 PM   Commonly known as:  MOTRIN    Take 1 Tab by mouth 3 times a day, with meals for 5 days.   Dose:  800 mg       nitroglycerin 0.4 MG Subl   Last time this was given:  0.4 mg on 1/30/2017 10:15 AM   Commonly known as:  NITROSTAT    Place 1 Tab under tongue as needed for Chest Pain.   Dose:  0.4 mg       tramadol 50 MG Tabs   Commonly known as:  ULTRAM    Take 1 Tab by mouth every 6 hours as needed for Moderate Pain.   Dose:  50 mg         CONTINUE taking these medications       Instructions    aspirin EC 81 MG Tbec   Commonly known as:  ECOTRIN    Take 81 mg by mouth every bedtime.   Dose:  81 mg       calcium carbonate 500 MG Chew   Commonly known as:  TUMS    Take 500 mg by mouth as needed (For upset stomach).   Dose:  500 mg       fish oil 1000 MG Caps capsule    Take 1,000 mg by mouth every bedtime.   Dose:  1000 mg       GLUCOSAMINE CHONDROITIN JOINT PO    Take 1 Tab by mouth every day.   Dose:  1 Tab       hydrocodone-acetaminophen 5-325 MG Tabs per tablet   Last time this was given:  1 Tab on 1/31/2017  9:34 AM   Commonly known as:  NORCO    Take 1-2 Tabs by mouth every four hours as needed. Indications: Moderate to Moderately Severe Pain   Dose:  1-2 Tab       metformin 1000 MG tablet   Commonly known as:  GLUCOPHAGE    Take  1,000 mg by mouth 2 times a day, with meals.   Dose:  1000 mg       tamsulosin 0.4 MG capsule   Last time this was given:  0.4 mg on 1/30/2017  8:16 PM   Commonly known as:  FLOMAX    Take 0.4 mg by mouth every bedtime.   Dose:  0.4 mg       therapeutic multivitamin-minerals Tabs    Take 1 Tab by mouth every day.   Dose:  1 Tab         STOP taking these medications          ALEVE 220 MG tablet   Generic drug:  naproxen               FOLLOW UP:  I encouraged him to call his PCP to confirm follow up after discharge.    D/C to: Home  CONDITION:stable  DIET: Heart Healthy / Diabetic  ACTIVITY: As tolerated  CODE STATUS: Full    Please see Full Dictation #  [id number]      JESSICA Singh.

## 2017-02-01 LAB — EKG IMPRESSION: NORMAL

## 2017-02-01 NOTE — DISCHARGE SUMMARY
PRIMARY CARE PHYSICIAN:  Branden Rooney MD    CONSULTS:  None.    CHIEF COMPLAINT:  Chest pain.    DISCHARGE DIAGNOSIS:  Chest pain.    CHRONIC MEDICAL PROBLEMS:  Diabetes mellitus, CAD, BPH, and history of MI.    PROCEDURES:  None.    RESULTS REVIEW:  Interval history/exam for day of discharge:    Filed Vitals:    01/31/17 0005 01/31/17 0359 01/31/17 0701 01/31/17 1225   BP: 120/74 124/70 116/71 115/77   Pulse: 60 62 74 66   Temp: 36 °C (96.8 °F) 36.7 °C (98 °F) 36.7 °C (98.1 °F) 36.7 °C (98 °F)   Resp: 12 13 18 20   Height:       Weight:       SpO2: 91% 90%  94%     Weight/BMI: Body mass index is 35.87 kg/(m^2).  Pulse Oximetry: 94 %      Most Recent Labs:    Lab Results   Component Value Date/Time    WBC 6.6 01/30/2017 09:35 AM    RBC 5.17 01/30/2017 09:35 AM    HEMOGLOBIN 15.4 01/30/2017 09:35 AM    HEMATOCRIT 45.9 01/30/2017 09:35 AM    MCV 88.8 01/30/2017 09:35 AM    MCH 29.8 01/30/2017 09:35 AM    MCHC 33.6* 01/30/2017 09:35 AM    MPV 10.3 01/30/2017 09:35 AM    NEUTROPHILS-POLYS 44.70 01/30/2017 09:35 AM    LYMPHOCYTES 40.70 01/30/2017 09:35 AM    MONOCYTES 10.10 01/30/2017 09:35 AM    EOSINOPHILS 2.70 01/30/2017 09:35 AM    BASOPHILS 1.20 01/30/2017 09:35 AM      Lab Results   Component Value Date/Time    SODIUM 135 01/30/2017 09:35 AM    POTASSIUM 4.2 01/30/2017 09:35 AM    CHLORIDE 103 01/30/2017 09:35 AM    CO2 22 01/30/2017 09:35 AM    GLUCOSE 178* 01/30/2017 09:35 AM    BUN 21 01/30/2017 09:35 AM    CREATININE 0.79 01/30/2017 09:35 AM      Lab Results   Component Value Date/Time    ALT(SGPT) 57* 01/30/2017 09:35 AM    AST(SGOT) 25 01/30/2017 09:35 AM    ALKALINE PHOSPHATASE 35 01/30/2017 09:35 AM    TOTAL BILIRUBIN 0.8 01/30/2017 09:35 AM    LIPASE 15 01/30/2017 09:35 AM    ALBUMIN 4.5 01/30/2017 09:35 AM    GLOBULIN 2.9 01/30/2017 09:35 AM    INR 0.92 01/30/2017 09:35 AM     Lab Results   Component Value Date/Time    PT 12.6 01/30/2017 09:35 AM    INR 0.92 01/30/2017 09:35 AM        Imaging/  Testing:      NM-CARDIAC STRESS TEST   Final Result   No evidence of significant jeopardized viable myocardium or prior myocardial    infarction.   Normal left ventricular size, ejection fraction, and wall motion.   DX-CHEST-PORTABLE (1 VIEW)   Final Result      No evidence of acute cardiopulmonary process.               Medication List      START taking these medications       Instructions    ibuprofen 800 MG Tabs   Last time this was given:  800 mg on 1/31/2017  1:00 PM   Commonly known as:  MOTRIN    Take 1 Tab by mouth 3 times a day, with meals for 5 days.   Dose:  800 mg       nitroglycerin 0.4 MG Subl   Last time this was given:  0.4 mg on 1/30/2017 10:15 AM   Commonly known as:  NITROSTAT    Place 1 Tab under tongue as needed for Chest Pain.   Dose:  0.4 mg       tramadol 50 MG Tabs   Commonly known as:  ULTRAM    Take 1 Tab by mouth every 6 hours as needed for Moderate Pain.   Dose:  50 mg         CONTINUE taking these medications       Instructions    aspirin EC 81 MG Tbec   Commonly known as:  ECOTRIN    Take 81 mg by mouth every bedtime.   Dose:  81 mg       calcium carbonate 500 MG Chew   Commonly known as:  TUMS    Take 500 mg by mouth as needed (For upset stomach).   Dose:  500 mg       fish oil 1000 MG Caps capsule    Take 1,000 mg by mouth every bedtime.   Dose:  1000 mg       GLUCOSAMINE CHONDROITIN JOINT PO    Take 1 Tab by mouth every day.   Dose:  1 Tab       hydrocodone-acetaminophen 5-325 MG Tabs per tablet   Last time this was given:  1 Tab on 1/31/2017  9:34 AM   Commonly known as:  NORCO    Take 1-2 Tabs by mouth every four hours as needed. Indications: Moderate to Moderately Severe Pain   Dose:  1-2 Tab       metformin 1000 MG tablet   Commonly known as:  GLUCOPHAGE    Take 1,000 mg by mouth 2 times a day, with meals.   Dose:  1000 mg       tamsulosin 0.4 MG capsule   Last time this was given:  0.4 mg on 1/30/2017  8:16 PM   Commonly known as:  FLOMAX    Take 0.4 mg by mouth every bedtime.    Dose:  0.4 mg       therapeutic multivitamin-minerals Tabs    Take 1 Tab by mouth every day.   Dose:  1 Tab         STOP taking these medications          ALEVE 220 MG tablet   Generic drug:  naproxen                 HOSPITAL COURSE AND DECISION MAKING:  Please see H and P dictated by Dr. Vitaliy Paris on 1/30/2017.  In brief, this is a 61-year-old male who   presented to the emergency room with chest pain.  He has a history of coronary   artery disease and MI x2 in the past.  He, however, at this time described a   right of the sternum stabbing sensation, worse with movement whereas   previously his MIs where like an elephant sitting on his chest.  He is   admitted for telemetry monitoring, serial troponins which were negative and   stress test, which was negative as well.  He did continue to have this right   sternal chest pain.  This did slightly improve with ibuprofen 800 mg t.i.d.   but did continue.  He is stable for discharge home on 01/31/2017 back to home   and will follow up with his primary physician.  Continue on with the ibuprofen   800 mg t.i.d. over this next week.  We reviewed pain management strategies   for musculoskeletal chest pain.  He verbalized understanding of his discharge   instructions.  All of his questions and concerns were addressed prior to   discharge.  He was given new prescriptions for ibuprofen, nitroglycerin, and   tramadol.    ACTIVITY:  He may resume his activities as tolerated.    DIET:  Resume a regular heart healthy diabetic diet.    Instructions:      The patient was instructed to return to the ER in the event of worsening symptoms. I have counseled the patient on the importance of compliance and the patient has agreed to proceed with all medical recommendations and follow up plan indicated above.   The patient understands that all medications come with benefits and risks. Risks may include permanent injury or death and these risks can be minimized with close  reassessment and monitoring.        Opioid prescription history checked: no    Time spent in coordination of discharge was 35 minutes. This included face to face with the patient, medication reconciliation, care co ordination with RN involved in patient care and discussion and co ordination with case management.            ____________________________________     DIONNE Velasquez    DD:  01/31/2017 15:17:28  DT:  02/01/2017 02:56:21    D#:  [id number]  Job#:  [id number]

## 2017-02-17 ENCOUNTER — APPOINTMENT (OUTPATIENT)
Dept: RADIOLOGY | Facility: MEDICAL CENTER | Age: 62
End: 2017-02-17
Attending: EMERGENCY MEDICINE
Payer: COMMERCIAL

## 2017-02-17 LAB
ALBUMIN SERPL BCP-MCNC: 4.3 G/DL (ref 3.2–4.9)
ALBUMIN/GLOB SERPL: 1.3 G/DL
ALP SERPL-CCNC: 46 U/L (ref 30–99)
ALT SERPL-CCNC: 56 U/L (ref 2–50)
ANION GAP SERPL CALC-SCNC: 11 MMOL/L (ref 0–11.9)
APTT PPP: 23.2 SEC (ref 24.7–36)
AST SERPL-CCNC: 25 U/L (ref 12–45)
BASOPHILS # BLD AUTO: 0.9 % (ref 0–1.8)
BASOPHILS # BLD: 0.07 K/UL (ref 0–0.12)
BILIRUB SERPL-MCNC: 0.4 MG/DL (ref 0.1–1.5)
BNP SERPL-MCNC: 14 PG/ML (ref 0–100)
BUN SERPL-MCNC: 16 MG/DL (ref 8–22)
CALCIUM SERPL-MCNC: 10 MG/DL (ref 8.5–10.5)
CHLORIDE SERPL-SCNC: 102 MMOL/L (ref 96–112)
CO2 SERPL-SCNC: 22 MMOL/L (ref 20–33)
CREAT SERPL-MCNC: 0.82 MG/DL (ref 0.5–1.4)
EKG IMPRESSION: NORMAL
EOSINOPHIL # BLD AUTO: 0.43 K/UL (ref 0–0.51)
EOSINOPHIL NFR BLD: 5.2 % (ref 0–6.9)
ERYTHROCYTE [DISTWIDTH] IN BLOOD BY AUTOMATED COUNT: 42 FL (ref 35.9–50)
GFR SERPL CREATININE-BSD FRML MDRD: >60 ML/MIN/1.73 M 2
GLOBULIN SER CALC-MCNC: 3.4 G/DL (ref 1.9–3.5)
GLUCOSE SERPL-MCNC: 145 MG/DL (ref 65–99)
HCT VFR BLD AUTO: 46.6 % (ref 42–52)
HGB BLD-MCNC: 15.8 G/DL (ref 14–18)
INR PPP: 0.89 (ref 0.87–1.13)
LIPASE SERPL-CCNC: 14 U/L (ref 11–82)
LYMPHOCYTES # BLD AUTO: 4.37 K/UL (ref 1–4.8)
LYMPHOCYTES NFR BLD: 52.6 % (ref 22–41)
MANUAL DIFF BLD: NORMAL
MCH RBC QN AUTO: 29.6 PG (ref 27–33)
MCHC RBC AUTO-ENTMCNC: 33.9 G/DL (ref 33.7–35.3)
MCV RBC AUTO: 87.4 FL (ref 81.4–97.8)
METAMYELOCYTES NFR BLD MANUAL: 0.9 %
MONOCYTES # BLD AUTO: 0.37 K/UL (ref 0–0.85)
MONOCYTES NFR BLD AUTO: 4.4 % (ref 0–13.4)
MORPHOLOGY BLD-IMP: NORMAL
MYELOCYTES NFR BLD MANUAL: 0.9 %
NEUTROPHILS # BLD AUTO: 2.91 K/UL (ref 1.82–7.42)
NEUTROPHILS NFR BLD: 34.2 % (ref 44–72)
NEUTS BAND NFR BLD MANUAL: 0.9 % (ref 0–10)
NRBC # BLD AUTO: 0 K/UL
NRBC BLD AUTO-RTO: 0 /100 WBC
PLATELET # BLD AUTO: 258 K/UL (ref 164–446)
PLATELET BLD QL SMEAR: NORMAL
PMV BLD AUTO: 10 FL (ref 9–12.9)
POTASSIUM SERPL-SCNC: 4.2 MMOL/L (ref 3.6–5.5)
PROT SERPL-MCNC: 7.7 G/DL (ref 6–8.2)
PROTHROMBIN TIME: 12.3 SEC (ref 12–14.6)
RBC # BLD AUTO: 5.33 M/UL (ref 4.7–6.1)
RBC BLD AUTO: PRESENT
SMUDGE CELLS BLD QL SMEAR: NORMAL
SODIUM SERPL-SCNC: 135 MMOL/L (ref 135–145)
TROPONIN I SERPL-MCNC: <0.01 NG/ML (ref 0–0.04)
WBC # BLD AUTO: 8.3 K/UL (ref 4.8–10.8)

## 2017-02-17 PROCEDURE — 85610 PROTHROMBIN TIME: CPT

## 2017-02-17 PROCEDURE — 93005 ELECTROCARDIOGRAM TRACING: CPT

## 2017-02-17 PROCEDURE — 85007 BL SMEAR W/DIFF WBC COUNT: CPT

## 2017-02-17 PROCEDURE — 96375 TX/PRO/DX INJ NEW DRUG ADDON: CPT

## 2017-02-17 PROCEDURE — 80053 COMPREHEN METABOLIC PANEL: CPT

## 2017-02-17 PROCEDURE — 96374 THER/PROPH/DIAG INJ IV PUSH: CPT

## 2017-02-17 PROCEDURE — 99285 EMERGENCY DEPT VISIT HI MDM: CPT

## 2017-02-17 PROCEDURE — 85027 COMPLETE CBC AUTOMATED: CPT

## 2017-02-17 PROCEDURE — 83880 ASSAY OF NATRIURETIC PEPTIDE: CPT

## 2017-02-17 PROCEDURE — 85730 THROMBOPLASTIN TIME PARTIAL: CPT

## 2017-02-17 PROCEDURE — 84484 ASSAY OF TROPONIN QUANT: CPT

## 2017-02-17 PROCEDURE — 71010 DX-CHEST-LIMITED (1 VIEW): CPT

## 2017-02-17 PROCEDURE — 83690 ASSAY OF LIPASE: CPT

## 2017-02-17 PROCEDURE — 36415 COLL VENOUS BLD VENIPUNCTURE: CPT

## 2017-02-17 ASSESSMENT — PAIN SCALES - GENERAL: PAINLEVEL_OUTOF10: 10

## 2017-02-18 ENCOUNTER — APPOINTMENT (OUTPATIENT)
Dept: RADIOLOGY | Facility: MEDICAL CENTER | Age: 62
End: 2017-02-18
Attending: EMERGENCY MEDICINE
Payer: COMMERCIAL

## 2017-02-18 ENCOUNTER — HOSPITAL ENCOUNTER (EMERGENCY)
Facility: MEDICAL CENTER | Age: 62
End: 2017-02-18
Attending: EMERGENCY MEDICINE
Payer: COMMERCIAL

## 2017-02-18 VITALS
HEART RATE: 67 BPM | HEIGHT: 71 IN | BODY MASS INDEX: 35.74 KG/M2 | OXYGEN SATURATION: 96 % | WEIGHT: 255.29 LBS | SYSTOLIC BLOOD PRESSURE: 138 MMHG | RESPIRATION RATE: 13 BRPM | DIASTOLIC BLOOD PRESSURE: 72 MMHG | TEMPERATURE: 97.8 F

## 2017-02-18 DIAGNOSIS — R94.31 ABNORMAL EKG: ICD-10-CM

## 2017-02-18 DIAGNOSIS — R07.89 MUSCULOSKELETAL CHEST PAIN: ICD-10-CM

## 2017-02-18 LAB — TROPONIN I SERPL-MCNC: <0.01 NG/ML (ref 0–0.04)

## 2017-02-18 PROCEDURE — 96374 THER/PROPH/DIAG INJ IV PUSH: CPT

## 2017-02-18 PROCEDURE — A9270 NON-COVERED ITEM OR SERVICE: HCPCS | Performed by: EMERGENCY MEDICINE

## 2017-02-18 PROCEDURE — 700117 HCHG RX CONTRAST REV CODE 255: Performed by: EMERGENCY MEDICINE

## 2017-02-18 PROCEDURE — 84484 ASSAY OF TROPONIN QUANT: CPT

## 2017-02-18 PROCEDURE — 71275 CT ANGIOGRAPHY CHEST: CPT

## 2017-02-18 PROCEDURE — 700102 HCHG RX REV CODE 250 W/ 637 OVERRIDE(OP): Performed by: EMERGENCY MEDICINE

## 2017-02-18 PROCEDURE — 700111 HCHG RX REV CODE 636 W/ 250 OVERRIDE (IP): Performed by: EMERGENCY MEDICINE

## 2017-02-18 PROCEDURE — 96375 TX/PRO/DX INJ NEW DRUG ADDON: CPT

## 2017-02-18 RX ORDER — ONDANSETRON 2 MG/ML
4 INJECTION INTRAMUSCULAR; INTRAVENOUS ONCE
Status: COMPLETED | OUTPATIENT
Start: 2017-02-18 | End: 2017-02-18

## 2017-02-18 RX ORDER — ASPIRIN 325 MG
325 TABLET ORAL ONCE
Status: DISCONTINUED | OUTPATIENT
Start: 2017-02-18 | End: 2017-02-18

## 2017-02-18 RX ORDER — ASPIRIN 81 MG/1
324 TABLET, CHEWABLE ORAL ONCE
Status: COMPLETED | OUTPATIENT
Start: 2017-02-18 | End: 2017-02-18

## 2017-02-18 RX ORDER — OXYCODONE AND ACETAMINOPHEN 10; 325 MG/1; MG/1
1 TABLET ORAL ONCE
Status: COMPLETED | OUTPATIENT
Start: 2017-02-18 | End: 2017-02-18

## 2017-02-18 RX ORDER — HYDROCODONE BITARTRATE AND ACETAMINOPHEN 5; 325 MG/1; MG/1
1-2 TABLET ORAL EVERY 4 HOURS PRN
Qty: 20 TAB | Refills: 0 | Status: SHIPPED | OUTPATIENT
Start: 2017-02-18 | End: 2019-03-08

## 2017-02-18 RX ORDER — METAXALONE 800 MG/1
800 TABLET ORAL 3 TIMES DAILY
COMMUNITY
End: 2019-03-08

## 2017-02-18 RX ADMIN — ONDANSETRON 4 MG: 2 INJECTION, SOLUTION INTRAMUSCULAR; INTRAVENOUS at 03:35

## 2017-02-18 RX ADMIN — ASPIRIN 324 MG: 81 TABLET, CHEWABLE ORAL at 05:47

## 2017-02-18 RX ADMIN — FENTANYL CITRATE 100 MCG: 50 INJECTION, SOLUTION INTRAMUSCULAR; INTRAVENOUS at 03:36

## 2017-02-18 RX ADMIN — OXYCODONE HYDROCHLORIDE AND ACETAMINOPHEN 1 TABLET: 10; 325 TABLET ORAL at 05:52

## 2017-02-18 RX ADMIN — IOHEXOL 85 ML: 350 INJECTION, SOLUTION INTRAVENOUS at 05:17

## 2017-02-18 ASSESSMENT — ENCOUNTER SYMPTOMS
NAUSEA: 0
CHILLS: 0
VOMITING: 0
DIAPHORESIS: 0
BACK PAIN: 1
SHORTNESS OF BREATH: 0
DIARRHEA: 0

## 2017-02-18 ASSESSMENT — PAIN SCALES - GENERAL
PAINLEVEL_OUTOF10: 10

## 2017-02-18 NOTE — ED PROVIDER NOTES
"ED Provider Note    Scribed for Riley Feliciano D.O. by Sanford Tellez. 2/18/2017  1:39 AM    Primary care provider: Branden ROJAS M.D.  Means of arrival: Walk-in  History obtained from: Patient  History limited by: None    CHIEF COMPLAINT  Chief Complaint   Patient presents with   • Chest Pain     Right side, radiating down right arm x 3 weeks, seen here 3 weeks ago, seen by PCP yesterday       HPI  Hossein Anderson is a 61 y.o. male who presents to the Emergency Department with constant worsening right-sided chest pain onset 3 weeks ago. His pain has been worsening over the last three weeks and is now \"11/10\" on the pain scale. His pain radiates to the right side of his back and down his right arm, and is worsened with movement. Patient was seen for this pain in the ED 3 weeks ago and had a normal stress test. Patient was seen by his primary care provider yesterday and was given muscle relaxers. He has a history of two myocardial infarctions but states this pain feels different. Per wife, patient's primary care provider wanted a CT scan of his chest. Negative nausea, vomiting, diarrhea, chills, leg swelling, or shortness of breath.    REVIEW OF SYSTEMS  Review of Systems   Constitutional: Negative for chills and diaphoresis.   Respiratory: Negative for shortness of breath.    Cardiovascular: Positive for chest pain. Negative for leg swelling.   Gastrointestinal: Negative for nausea, vomiting and diarrhea.   Musculoskeletal: Positive for back pain.        Positive for arm pain   All other systems reviewed and are negative.   C.    PAST MEDICAL HISTORY   has a past medical history of CAD (coronary artery disease); Asthma; and Diabetes (CMS-HCC).    SURGICAL HISTORY  patient denies any surgical history    SOCIAL HISTORY  Social History   Substance Use Topics   • Smoking status: Never Smoker    • Smokeless tobacco: None   • Alcohol Use: Yes      Comment: rare      History   Drug Use No       FAMILY " "HISTORY  History reviewed. No pertinent family history.    CURRENT MEDICATIONS  Home Medications     Reviewed by Kerry Geiger R.N. (Registered Nurse) on 02/18/17 at 0040  Med List Status: Partial    Medication Last Dose Status    aspirin EC (ECOTRIN) 81 MG Tablet Delayed Response 1/29/2017 Active    calcium carbonate (TUMS) 500 MG Chew Tab 1/30/2017 Active    Glucos-Chondroit-Hyaluron-MSM (GLUCOSAMINE CHONDROITIN JOINT PO) 1/29/2017 Active    hydrocodone-acetaminophen (NORCO) 5-325 MG Tab per tablet 1/29/2017 Active    metaxalone (SKELAXIN) 800 MG Tab  Active    metformin (GLUCOPHAGE) 1000 MG tablet 1/30/2017 Active    nitroglycerin (NITROSTAT) 0.4 MG SL Tab  Active    Omega-3 Fatty Acids (FISH OIL) 1000 MG Cap capsule 1/29/2017 Active    tamsulosin (FLOMAX) 0.4 MG capsule 1/29/2017 Active    therapeutic multivitamin-minerals (THERAGRAN-M) Tab 1/29/2017 Active    tramadol (ULTRAM) 50 MG Tab  Active                ALLERGIES  Allergies   Allergen Reactions   • Codeine      Pt states he tolerates morphine 1/2017   • [place] Swelling     Pt states \"My mouth swells up\".         PHYSICAL EXAM  VITAL SIGNS: /82 mmHg  Pulse 79  Temp(Src) 36.6 °C (97.8 °F)  Resp 18  Ht 1.803 m (5' 11\")  Wt 115.8 kg (255 lb 4.7 oz)  BMI 35.62 kg/m2  SpO2 98%    Constitutional: Well developed, well nourished. No acute distress.  HEENT: Normocephalic, atraumatic. Posterior pharynx clear and moist.  Eyes:  EOMI. Normal sclera.  Neck: Supple, Full range of motion, nontender.  Chest/Pulmonary: Reproducible right chest wall tenderness. clear to ausculation. Symmetrical expansion.   Cardio: Regular rate and rhythm with no murmur.   Abdomen: Soft, nontender. No peritoneal signs. No guarding. No palpable masses.  Back: No CVA tenderness, nontender midline, no step offs.  Musculoskeletal: No deformity, no edema, neurovascular intact.   Neuro: Clear speech, appropriate, cooperative, cranial nerves II-XII grossly intact.  Psych: " Normal mood and affect    DIAGNOSTIC STUDIES / PROCEDURES    LABS  Results for orders placed or performed during the hospital encounter of 02/18/17   TROPONIN   Result Value Ref Range    Troponin I <0.01 0.00 - 0.04 ng/mL   BTYPE NATRIURETIC PEPTIDE   Result Value Ref Range    B Natriuretic Peptide 14 0 - 100 pg/mL   CBC WITH DIFFERENTIAL   Result Value Ref Range    WBC 8.3 4.8 - 10.8 K/uL    RBC 5.33 4.70 - 6.10 M/uL    Hemoglobin 15.8 14.0 - 18.0 g/dL    Hematocrit 46.6 42.0 - 52.0 %    MCV 87.4 81.4 - 97.8 fL    MCH 29.6 27.0 - 33.0 pg    MCHC 33.9 33.7 - 35.3 g/dL    RDW 42.0 35.9 - 50.0 fL    Platelet Count 258 164 - 446 K/uL    MPV 10.0 9.0 - 12.9 fL    Nucleated RBC 0.00 /100 WBC    NRBC (Absolute) 0.00 K/uL    Neutrophils-Polys 34.20 (L) 44.00 - 72.00 %    Lymphocytes 52.60 (H) 22.00 - 41.00 %    Monocytes 4.40 0.00 - 13.40 %    Eosinophils 5.20 0.00 - 6.90 %    Basophils 0.90 0.00 - 1.80 %    Neutrophils (Absolute) 2.91 1.82 - 7.42 K/uL    Lymphs (Absolute) 4.37 1.00 - 4.80 K/uL    Monos (Absolute) 0.37 0.00 - 0.85 K/uL    Eos (Absolute) 0.43 0.00 - 0.51 K/uL    Baso (Absolute) 0.07 0.00 - 0.12 K/uL   COMP METABOLIC PANEL   Result Value Ref Range    Sodium 135 135 - 145 mmol/L    Potassium 4.2 3.6 - 5.5 mmol/L    Chloride 102 96 - 112 mmol/L    Co2 22 20 - 33 mmol/L    Anion Gap 11.0 0.0 - 11.9    Glucose 145 (H) 65 - 99 mg/dL    Bun 16 8 - 22 mg/dL    Creatinine 0.82 0.50 - 1.40 mg/dL    Calcium 10.0 8.5 - 10.5 mg/dL    AST(SGOT) 25 12 - 45 U/L    ALT(SGPT) 56 (H) 2 - 50 U/L    Alkaline Phosphatase 46 30 - 99 U/L    Total Bilirubin 0.4 0.1 - 1.5 mg/dL    Albumin 4.3 3.2 - 4.9 g/dL    Total Protein 7.7 6.0 - 8.2 g/dL    Globulin 3.4 1.9 - 3.5 g/dL    A-G Ratio 1.3 g/dL   PROTHROMBIN TIME   Result Value Ref Range    PT 12.3 12.0 - 14.6 sec    INR 0.89 0.87 - 1.13   APTT   Result Value Ref Range    APTT 23.2 (L) 24.7 - 36.0 sec   LIPASE   Result Value Ref Range    Lipase 14 11 - 82 U/L   ESTIMATED GFR    Result Value Ref Range    GFR If African American >60 >60 mL/min/1.73 m 2    GFR If Non African American >60 >60 mL/min/1.73 m 2   DIFFERENTIAL MANUAL   Result Value Ref Range    Bands-Stabs 0.90 0.00 - 10.00 %    Metamyelocytes 0.90 %    Myelocytes 0.90 %    Manual Diff Status PERFORMED    PERIPHERAL SMEAR REVIEW   Result Value Ref Range    Peripheral Smear Review see below    PLATELET ESTIMATE   Result Value Ref Range    Plt Estimation Normal    MORPHOLOGY   Result Value Ref Range    RBC Morphology Present     Smudge Cells Few    TROPONIN   Result Value Ref Range    Troponin I <0.01 0.00 - 0.04 ng/mL   EKG (ER)   Result Value Ref Range    Report       [facility] Emergency Dept[facility]    Test Date:  2017  Pt Name:    STEFANY VERMA             Department: ER  MRN:        [id number]                      Room:  Gender:     CAROLYN                            Technician: [id number]  :        [id number]                   Requested By:ER TRIAGE PROTOCOL  Order #:    [id number]                    Reading MD:    Measurements  Intervals                                Axis  Rate:       62                           P:          62  SD:         188                          QRS:        71  QRSD:       88                           T:          69  QT:         372  QTc:        378    Interpretive Statements  SINUS RHYTHM  BORDERLINE T ABNORMALITIES, ANT-LAT LEADS  BASELINE WANDER IN LEAD(S) V3,V4,V5  Compared to ECG 2017 01:11:43  T-wave abnormality now present        All labs reviewed by me.    EKG  Normal sinus rhythm at a rate of 62. Flat T wave in V2. Normal intervals. No ST elevations or depressions. No ectopy noted. No old EKG for comparison. As interpreted by me.     RADIOLOGY  DX-CHEST-LIMITED (1 VIEW)   Final Result      1.  No acute cardiac or pulmonary abnormalities are identified.      CT-CTA CHEST PULMONARY ARTERY W/ RECONS    (Results Pending)     The radiologist's interpretation of all radiological  studies have been reviewed by me.    COURSE & MEDICAL DECISION MAKING  Pertinent Labs & Imaging studies reviewed. (See chart for details)    1:39 AM - Patient seen and examined at bedside. I explained to the patient his pain will be treated and I will order labs and imaging. Patient will be treated with fentanyl 100 mcg IV, Zofran 4 mg IV. Ordered CT-CTA chest, platelet estimate, peripheral smear review, morphology, DX-chest, CBC, CMP, troponin, BNP, lipase, prothrombin time, APTT, EKG to evaluate his symptoms. The differential diagnoses include but are not limited to: MI, musculoskeletal pain, pe, pneumonia, ptx.     4:43 AM  The pt had a negative stress test on jan 2017.  His ekg shows flattened t waves in the anterior leads, that were not present in previous ekg from last month.  At this time, eventhough he had a negative stress, I still feel we should trend his trops.  He  Has a ct of his chest that is pending, and requested by his family doctor, who sent him to the er.      5:15 AM  Dr. Ruvalcaba states she will admit pending on cardiology consult.  Dr. Jaquez will see the pt now, and make a decision on whether to admit or follow up.  Dr. Coulter will follow up on the CT scan results, and make the dispo.    FINAL IMPRESSION  1. Musculoskeletal chest pain    2. Abnormal EKG          Sanford COATS (Scribe), am scribing for, and in the presence of, Riley Feliciano D.O..    Electronically signed by: Sanford Tellez (Dontaibe), 2/18/2017    Riley COATS D.O. personally performed the services described in this documentation, as scribed by Sanford Tellez in my presence, and it is both accurate and complete.    The note accurately reflects work and decisions made by me.  Riley Feliciano  2/18/2017  5:01 AM

## 2017-02-18 NOTE — CONSULTS
"Cardiology Consult Note:    Celeste Rodriguez  Date & Time note created:    2/18/2017   5:42 AM     Referring MD:  Dr. Feliciano/ER    Patient ID:   Name:             Hossein Anderson   YOB: 1955  Age:                 61 y.o.  male   MRN:               [id number]                                                             Chief Complaint:      Rt CP    History of Present Illness:    61 y.o. male who presents to the Emergency Department with constant worsening right-sided chest pain onset 3 weeks ago. His pain has been worsening over the last three weeks and is now \"11/10\" on the pain scale. His pain radiates to the right side of his back and down his right arm, and is worsened with movement; Pain increased after pain med in ER;  EKG has no acute abnormality; Trop neg  He has 2 prior MI with different presentation    Review of Systems:      Constitutional: Denies fevers, Denies weight changes  Eyes: Denies changes in vision, no eye pain  Ears/Nose/Throat/Mouth: Denies nasal congestion or sore throat   Cardiovascular: + chest pain, - palpitations   Respiratory: - shortness of breath , Denies cough  Gastrointestinal/Hepatic: Denies abdominal pain, nausea, vomiting, diarrhea, constipation or GI bleeding   Genitourinary: Denies dysuria or frequency  Musculoskeletal/Rheum: Denies  joint pain and swelling   Skin: Denies rash  Neurological: Denies headache, confusion, memory loss or focal weakness/parasthesias  Psychiatric: denies mood disorder   Endocrine: Bernice thyroid problems  Heme/Oncology/Lymph Nodes: Denies enlarged lymph nodes, denies brusing or known bleeding disorder  All other systems were reviewed and are negative (AMA/CMS criteria)                Past Medical History:   Past Medical History   Diagnosis Date   • CAD (coronary artery disease)      2 MI in past 15 years ago   • Asthma    • Diabetes (CMS-HCC)      There are no hospital problems to display for this patient.      Past Surgical " "History:  History reviewed. No pertinent past surgical history.    Hospital Medications:    Current facility-administered medications:   •  aspirin (ASA) chewable tab 324 mg, 324 mg, Oral, Once, Riley Feliciano D.O.    Current outpatient prescriptions:   •  metaxalone (SKELAXIN) 800 MG Tab, Take 800 mg by mouth 3 times a day., Disp: , Rfl:   •  tramadol (ULTRAM) 50 MG Tab, Take 1 Tab by mouth every 6 hours as needed for Moderate Pain., Disp: 20 Tab, Rfl: 0  •  nitroglycerin (NITROSTAT) 0.4 MG SL Tab, Place 1 Tab under tongue as needed for Chest Pain., Disp: 25 Tab, Rfl: 0  •  metformin (GLUCOPHAGE) 1000 MG tablet, Take 1,000 mg by mouth 2 times a day, with meals., Disp: , Rfl:   •  calcium carbonate (TUMS) 500 MG Chew Tab, Take 500 mg by mouth as needed (For upset stomach)., Disp: , Rfl:   •  tamsulosin (FLOMAX) 0.4 MG capsule, Take 0.4 mg by mouth every bedtime., Disp: , Rfl:   •  aspirin EC (ECOTRIN) 81 MG Tablet Delayed Response, Take 81 mg by mouth every bedtime., Disp: , Rfl:   •  Omega-3 Fatty Acids (FISH OIL) 1000 MG Cap capsule, Take 1,000 mg by mouth every bedtime., Disp: , Rfl:   •  therapeutic multivitamin-minerals (THERAGRAN-M) Tab, Take 1 Tab by mouth every day., Disp: , Rfl:   •  Glucos-Chondroit-Hyaluron-MSM (GLUCOSAMINE CHONDROITIN JOINT PO), Take 1 Tab by mouth every day., Disp: , Rfl:   •  hydrocodone-acetaminophen (NORCO) 5-325 MG Tab per tablet, Take 1-2 Tabs by mouth every four hours as needed. Indications: Moderate to Moderately Severe Pain, Disp: , Rfl:     Current Outpatient Medications:    (Not in a hospital admission)    Medication Allergy:  Allergies   Allergen Reactions   • Codeine      Pt states he tolerates morphine 1/2017   • [place] Swelling     Pt states \"My mouth swells up\".         Family History:  History reviewed. No pertinent family history.    Social History:  Social History     Social History   • Marital Status:      Spouse Name: N/A   • Number of Children: N/A   • " "Years of Education: N/A     Occupational History   • Not on file.     Social History Main Topics   • Smoking status: Never Smoker    • Smokeless tobacco: Not on file   • Alcohol Use: Yes      Comment: rare   • Drug Use: No   • Sexual Activity: Not on file     Other Topics Concern   • Not on file     Social History Narrative         Physical Exam:  Vitals  Weight/BMI: Body mass index is 35.62 kg/(m^2).  Blood pressure 138/72, pulse 78, temperature 36.6 °C (97.8 °F), resp. rate 16, height 1.803 m (5' 11\"), weight 115.8 kg (255 lb 4.7 oz), SpO2 98 %.  Filed Vitals:    02/17/17 1601 02/18/17 0040 02/18/17 0330 02/18/17 0426   BP:  134/82 128/85 138/72   Pulse:  79 68 78   Temp:  36.6 °C (97.8 °F)     Resp:  18 18 16   Height: 1.803 m (5' 11\")      Weight: 115.8 kg (255 lb 4.7 oz)      SpO2:         Oxygen Therapy:  Pulse Oximetry: 98 %, O2 Delivery: None (Room Air)  General Appearance:  Obese;  Well developed, Well nourished, No acute distress, Non-toxic appearance.   HENT:  Normocephalic, Atraumatic, Oropharynx moist mucous membranes, Dentition: Mallamapti 4 OP, Nose normal.    Eyes:  PERRLA, EOMI, Conjunctiva normal, No discharge.  Neck:  Normal range of motion, No cervical tenderness, Supple, No stridor, no JVD .  No thyromegaly.  No carotid bruit.  Cardiovascular:  Normal heart rate, Normal rhythm,  S1, S2, no S3, S4; No gallops; No murmurs, No rubs, .   Extremitites with intact distal pulses, no cyanosis, clubbing or edema.  No heaves, thrills, HJR;  Peripheral pulses: carotid 2+, brachial 2+, radial 2+, ulnar 2+, femoral 2+, popliteal 2+, PT 2+, DP 2+;  Lungs:  Respiratory effort is normal. Normal breath sounds on Lt  And diminished on Rt (h/o collapsed lung in the past), breath sounds clear to auscultation bilaterally,  no rales, no rhonchi, no wheezing.   Abdomen: Large; Bowel sounds normal, Soft, No tenderness, No guarding, No rebound, No masses, No hepatosplenomegaly.  Skin: Warm, Dry, No erythema, No rash, no " induration or crepitus.  Neurologic: Alert & oriented x 3, Normal motor function, Normal sensory function, No focal deficits noted, cranial nerves II through XII are normal,    Psychiatric: Affect normal, Judgment normal, Mood normal.      MDM (Data Review):     Records reviewed and summarized in current documentation    Lab Data Review:  Recent Results (from the past 24 hour(s))   EKG (ER)    Collection Time: 17  3:59 PM   Result Value Ref Range    Report       [facility] Emergency Dept.    Test Date:  2017  Pt Name:    STEFANY VERMA             Department: ER  MRN:        [id number]                      Room:  Gender:     M                            Technician: [id number]  :        [id number]                   Requested By:ER TRIAGE PROTOCOL  Order #:    [id number]                    Reading MD:    Measurements  Intervals                                Axis  Rate:       62                           P:          62  OK:         188                          QRS:        71  QRSD:       88                           T:          69  QT:         372  QTc:        378    Interpretive Statements  SINUS RHYTHM  BORDERLINE T ABNORMALITIES, ANT-LAT LEADS  BASELINE WANDER IN LEAD(S) V3,V4,V5  Compared to ECG 2017 01:11:43  T-wave abnormality now present     TROPONIN    Collection Time: 17  6:28 PM   Result Value Ref Range    Troponin I <0.01 0.00 - 0.04 ng/mL   BTYPE NATRIURETIC PEPTIDE    Collection Time: 17  6:28 PM   Result Value Ref Range    B Natriuretic Peptide 14 0 - 100 pg/mL   CBC WITH DIFFERENTIAL    Collection Time: 17  6:28 PM   Result Value Ref Range    WBC 8.3 4.8 - 10.8 K/uL    RBC 5.33 4.70 - 6.10 M/uL    Hemoglobin 15.8 14.0 - 18.0 g/dL    Hematocrit 46.6 42.0 - 52.0 %    MCV 87.4 81.4 - 97.8 fL    MCH 29.6 27.0 - 33.0 pg    MCHC 33.9 33.7 - 35.3 g/dL    RDW 42.0 35.9 - 50.0 fL    Platelet Count 258 164 - 446 K/uL    MPV 10.0 9.0 - 12.9 fL    Nucleated RBC  0.00 /100 WBC    NRBC (Absolute) 0.00 K/uL    Neutrophils-Polys 34.20 (L) 44.00 - 72.00 %    Lymphocytes 52.60 (H) 22.00 - 41.00 %    Monocytes 4.40 0.00 - 13.40 %    Eosinophils 5.20 0.00 - 6.90 %    Basophils 0.90 0.00 - 1.80 %    Neutrophils (Absolute) 2.91 1.82 - 7.42 K/uL    Lymphs (Absolute) 4.37 1.00 - 4.80 K/uL    Monos (Absolute) 0.37 0.00 - 0.85 K/uL    Eos (Absolute) 0.43 0.00 - 0.51 K/uL    Baso (Absolute) 0.07 0.00 - 0.12 K/uL   COMP METABOLIC PANEL    Collection Time: 02/17/17  6:28 PM   Result Value Ref Range    Sodium 135 135 - 145 mmol/L    Potassium 4.2 3.6 - 5.5 mmol/L    Chloride 102 96 - 112 mmol/L    Co2 22 20 - 33 mmol/L    Anion Gap 11.0 0.0 - 11.9    Glucose 145 (H) 65 - 99 mg/dL    Bun 16 8 - 22 mg/dL    Creatinine 0.82 0.50 - 1.40 mg/dL    Calcium 10.0 8.5 - 10.5 mg/dL    AST(SGOT) 25 12 - 45 U/L    ALT(SGPT) 56 (H) 2 - 50 U/L    Alkaline Phosphatase 46 30 - 99 U/L    Total Bilirubin 0.4 0.1 - 1.5 mg/dL    Albumin 4.3 3.2 - 4.9 g/dL    Total Protein 7.7 6.0 - 8.2 g/dL    Globulin 3.4 1.9 - 3.5 g/dL    A-G Ratio 1.3 g/dL   PROTHROMBIN TIME    Collection Time: 02/17/17  6:28 PM   Result Value Ref Range    PT 12.3 12.0 - 14.6 sec    INR 0.89 0.87 - 1.13   APTT    Collection Time: 02/17/17  6:28 PM   Result Value Ref Range    APTT 23.2 (L) 24.7 - 36.0 sec   LIPASE    Collection Time: 02/17/17  6:28 PM   Result Value Ref Range    Lipase 14 11 - 82 U/L   ESTIMATED GFR    Collection Time: 02/17/17  6:28 PM   Result Value Ref Range    GFR If African American >60 >60 mL/min/1.73 m 2    GFR If Non African American >60 >60 mL/min/1.73 m 2   DIFFERENTIAL MANUAL    Collection Time: 02/17/17  6:28 PM   Result Value Ref Range    Bands-Stabs 0.90 0.00 - 10.00 %    Metamyelocytes 0.90 %    Myelocytes 0.90 %    Manual Diff Status PERFORMED    PERIPHERAL SMEAR REVIEW    Collection Time: 02/17/17  6:28 PM   Result Value Ref Range    Peripheral Smear Review see below    PLATELET ESTIMATE    Collection Time:  02/17/17  6:28 PM   Result Value Ref Range    Plt Estimation Normal    MORPHOLOGY    Collection Time: 02/17/17  6:28 PM   Result Value Ref Range    RBC Morphology Present     Smudge Cells Few    TROPONIN    Collection Time: 02/18/17  3:40 AM   Result Value Ref Range    Troponin I <0.01 0.00 - 0.04 ng/mL       Imaging/Procedures Review:    Chest Xray:  Reviewed    EKG:   As in HPI. See above    MDM (Assessment and Plan):     Non-cardiac CP; likely muscle-skeletal pain  Prior MI x 2  DM  Obesity  Prob sleep issues    High CV risks;  Outpatient CV risk management;  Discussed with ER attending; Will see him as outpatient in 2 wks  Treat his noncardiac pain

## 2017-02-18 NOTE — ED AVS SNAPSHOT
Home Care Instructions                                                                                                                Hossein Anderson   MRN: [id number]    Department:  [facility], Emergency Dept   Date of Visit:  2/17/2017            [facility], Emergency Dept    [address] [place] [address]    Phone:  [phone]      You were seen by     1. Riley Feliciano D.O.    2. Anju Coulter D.O.      Your Diagnosis Was     Musculoskeletal chest pain     R07.89       These are the medications you received during your hospitalization from 02/17/2017 1551 to 02/18/2017 0609     Date/Time Order Dose Route Action    02/18/2017 0336 fentaNYL (SUBLIMAZE) injection 100 mcg 100 mcg Intravenous Given    02/18/2017 0335 ondansetron (ZOFRAN) syringe/vial injection 4 mg 4 mg Intravenous Given    02/18/2017 0547 aspirin (ASA) chewable tab 324 mg 324 mg Oral Given    02/18/2017 0517 iohexol (OMNIPAQUE) 350 mg/mL 85 mL Intravenous Given    02/18/2017 0552 oxycodone-acetaminophen (PERCOCET-10)  MG per tablet 1 Tab 1 Tab Oral Given      Follow-up Information     1. Follow up with Branden ROJAS M.D. In 2 days.    Specialty:  Family Medicine    Contact information    [address] [facility] [address] [phone]          2. Follow up with Celeste Rodriguez MD,[facility] In 2 weeks.    Specialty:  Cardiology    Contact information    [address] [address] [address]  [facility] [address] [phone]          3. Follow up with [facility], Emergency Dept.    Specialty:  Emergency Medicine    Why:  If symptoms worsen    Contact information    [address] [facility] [address] [phone]      Medication Information     Review all of your home medications and newly ordered medications with your primary doctor and/or pharmacist as soon as possible. Follow medication instructions as directed by your doctor and/or pharmacist.     Please  keep your complete medication list with you and share with your physician. Update the information when medications are discontinued, doses are changed, or new medications (including over-the-counter products) are added; and carry medication information at all times in the event of emergency situations.               Medication List      ASK your doctor about these medications        Instructions    aspirin EC 81 MG Tbec   Commonly known as:  ECOTRIN    Take 81 mg by mouth every bedtime.   Dose:  81 mg       calcium carbonate 500 MG Chew   Commonly known as:  TUMS    Take 500 mg by mouth as needed (For upset stomach).   Dose:  500 mg       fish oil 1000 MG Caps capsule    Take 1,000 mg by mouth every bedtime.   Dose:  1000 mg       GLUCOSAMINE CHONDROITIN JOINT PO    Take 1 Tab by mouth every day.   Dose:  1 Tab       * hydrocodone-acetaminophen 5-325 MG Tabs per tablet   What changed:  Another medication with the same name was added. Make sure you understand how and when to take each.   Commonly known as:  NORCO   Ask about: Which instructions should I use?    Take 1-2 Tabs by mouth every four hours as needed. Indications: Moderate to Moderately Severe Pain   Dose:  1-2 Tab       * hydrocodone-acetaminophen 5-325 MG Tabs per tablet   What changed:  You were already taking a medication with the same name, and this prescription was added. Make sure you understand how and when to take each.   Commonly known as:  NORCO   Ask about: Which instructions should I use?    Take 1-2 Tabs by mouth every four hours as needed.   Dose:  1-2 Tab       metaxalone 800 MG Tabs   Commonly known as:  SKELAXIN    Take 800 mg by mouth 3 times a day.   Dose:  800 mg       metformin 1000 MG tablet   Commonly known as:  GLUCOPHAGE    Take 1,000 mg by mouth 2 times a day, with meals.   Dose:  1000 mg       nitroglycerin 0.4 MG Subl   Commonly known as:  NITROSTAT    Place 1 Tab under tongue as needed for Chest Pain.   Dose:  0.4 mg        tamsulosin 0.4 MG capsule   Commonly known as:  FLOMAX    Take 0.4 mg by mouth every bedtime.   Dose:  0.4 mg       therapeutic multivitamin-minerals Tabs    Take 1 Tab by mouth every day.   Dose:  1 Tab       tramadol 50 MG Tabs   Commonly known as:  ULTRAM    Take 1 Tab by mouth every 6 hours as needed for Moderate Pain.   Dose:  50 mg       * Notice:  This list has 2 medication(s) that are the same as other medications prescribed for you. Read the directions carefully, and ask your doctor or other care provider to review them with you.            Procedures and tests performed during your visit     Procedure/Test Number of Times Performed    APTT 1    BTYPE NATRIURETIC PEPTIDE 1    CARDIAC MONITORING 1    CBC WITH DIFFERENTIAL 1    COMP METABOLIC PANEL 1    CONSENT FOR CONTRAST INJECTION 1    CT-CTA CHEST PULMONARY ARTERY W/ RECONS 1    Cardiac Monitoring 1    DIFFERENTIAL MANUAL 1    DX-CHEST-LIMITED (1 VIEW) 1    EKG (ER) 1    ESTIMATED GFR 1    IV Saline Lock 1    LIPASE 1    MORPHOLOGY 1    O2 Protocol 1    Oxygen 1    PERIPHERAL SMEAR REVIEW 1    PLATELET ESTIMATE 1    PROTHROMBIN TIME 1    SALINE LOCK 1    TROPONIN 2        Discharge Instructions       Chest Wall Pain  Chest wall pain is pain in or around the bones and muscles of your chest. It may take up to 6 weeks to get better. It may take longer if you must stay physically active in your work and activities.   CAUSES   Chest wall pain may happen on its own. However, it may be caused by:  · A viral illness like the flu.  · Injury.  · Coughing.  · Exercise.  · Arthritis.  · Fibromyalgia.  · Shingles.  HOME CARE INSTRUCTIONS   · Avoid overtiring physical activity. Try not to strain or perform activities that cause pain. This includes any activities using your chest or your abdominal and side muscles, especially if heavy weights are used.  · Put ice on the sore area.  ¨ Put ice in a plastic bag.  ¨ Place a towel between your skin and the bag.  ¨ Leave the  ice on for 15-20 minutes per hour while awake for the first 2 days.  · Only take over-the-counter or prescription medicines for pain, discomfort, or fever as directed by your caregiver.  SEEK IMMEDIATE MEDICAL CARE IF:   · Your pain increases, or you are very uncomfortable.  · You have a fever.  · Your chest pain becomes worse.  · You have new, unexplained symptoms.  · You have nausea or vomiting.  · You feel sweaty or lightheaded.  · You have a cough with phlegm (sputum), or you cough up blood.  MAKE SURE YOU:   · Understand these instructions.  · Will watch your condition.  · Will get help right away if you are not doing well or get worse.     This information is not intended to replace advice given to you by your health care provider. Make sure you discuss any questions you have with your health care provider.     Document Released: 12/18/2006 Document Revised: 03/11/2013 Document Reviewed: 03/14/2016  [employer] Interactive Patient Education ©2016 [employer]            Patient Information     Patient Information    Following emergency treatment: all patient requiring follow-up care must return either to a private physician or a clinic if your condition worsens before you are able to obtain further medical attention, please return to the emergency room.     Billing Information    At [facility], we work to make the billing process streamlined for our patients.  Our Representatives are here to answer any questions you may have regarding your hospital bill.  If you have insurance coverage and have supplied your insurance information to us, we will submit a claim to your insurer on your behalf.  Should you have any questions regarding your bill, we can be reached online or by phone as follows:  Online: You are able pay your bills online or live chat with our representatives about any billing questions you may have. We are here to help Monday - Friday from 8:00am to 7:30pm and 9:00am - 12:00pm on Saturdays.   Please visit [email or web address]  for more information.   Phone:  [phone] or [phone]    Please note that your emergency physician, surgeon, pathologist, radiologist, anesthesiologist, and other specialists are not employed by [facility] and will therefore bill separately for their services.  Please contact them directly for any questions concerning their bills at the numbers below:     Emergency Physician Services:  [phone]  [facility]:  [phone]  [facility]:  [phone]  [facility]:  [phone]    1. Your final bill may vary from the amount quoted upon discharge if all procedures are not complete at that time, or if your doctor has additional procedures of which we are not aware. You will receive an additional bill if you return to the Emergency Department at [facility] for suture removal regardless of the facility of which the sutures were placed.     2. Please arrange for settlement of this account at the emergency registration.    3. All self-pay accounts are due in full at the time of treatment.  If you are unable to meet this obligation then payment is expected within 4-5 days.     4. If you have had radiology studies (CT, X-ray, Ultrasound, MRI), you have received a preliminary result during your emergency department visit. Please contact the radiology department [phone] to receive a copy of your final result. Please discuss the Final result with your primary physician or with the follow up physician provided.     Crisis Hotline:  [place] Crisis Hotline:  5-926-SRBBBZN or 1-417.856.4939  [place] Crisis Hotline:    1-272.899.1660 or 914-834-4681         ED Discharge Follow Up Questions    1. In order to provide you with very good care, we would like to follow up with a phone call in the next few days.  May we have your permission to contact you?     YES /  NO    2. What is the best phone number  to call you? (       )_____-__________    3. What is the best time to call you?      Morning  /  Afternoon  /  Evening                   Patient Signature:  ____________________________________________________________    Date:  ____________________________________________________________

## 2017-02-18 NOTE — ED PROVIDER NOTES
5:47 AM patient care signed out from night ERP. Patient awating evaluation by Cardiology. Dispo per their reccommendations.     5:47 AM D/W Dr. Rodriguez, who saw the patient and feels his pain is not cardiac in nature and can be d/c to home to follow-up with him in weeks. Patient still awaiting CT chest.    6:07 AM I rechecked with the patient and discussed plan of care including pain management with his primary srinivasa physician. I informed him that radiology imaging results were not revealing.That patient will follow up with Cardiology in two weeks. He understands and verbalizes agreement.  checked. He will follow-up with [facility] this up coming week., given strict return precautions. Instructed not to take metformin after CT contrast dye for 2 days.

## 2017-02-18 NOTE — ED NOTES
Pt placed on oxygen at 2l/min via nc for sao2 of 84% after fentanyl, pt sleeping, awakens easily, states pain is better

## 2017-02-18 NOTE — DISCHARGE INSTRUCTIONS
Chest Wall Pain  Chest wall pain is pain in or around the bones and muscles of your chest. It may take up to 6 weeks to get better. It may take longer if you must stay physically active in your work and activities.   CAUSES   Chest wall pain may happen on its own. However, it may be caused by:  · A viral illness like the flu.  · Injury.  · Coughing.  · Exercise.  · Arthritis.  · Fibromyalgia.  · Shingles.  HOME CARE INSTRUCTIONS   · Avoid overtiring physical activity. Try not to strain or perform activities that cause pain. This includes any activities using your chest or your abdominal and side muscles, especially if heavy weights are used.  · Put ice on the sore area.  ¨ Put ice in a plastic bag.  ¨ Place a towel between your skin and the bag.  ¨ Leave the ice on for 15-20 minutes per hour while awake for the first 2 days.  · Only take over-the-counter or prescription medicines for pain, discomfort, or fever as directed by your caregiver.  SEEK IMMEDIATE MEDICAL CARE IF:   · Your pain increases, or you are very uncomfortable.  · You have a fever.  · Your chest pain becomes worse.  · You have new, unexplained symptoms.  · You have nausea or vomiting.  · You feel sweaty or lightheaded.  · You have a cough with phlegm (sputum), or you cough up blood.  MAKE SURE YOU:   · Understand these instructions.  · Will watch your condition.  · Will get help right away if you are not doing well or get worse.     This information is not intended to replace advice given to you by your health care provider. Make sure you discuss any questions you have with your health care provider.     Document Released: 12/18/2006 Document Revised: 03/11/2013 Document Reviewed: 03/14/2016  [employer] Interactive Patient Education ©2016 [employer]

## 2017-02-18 NOTE — ED AVS SNAPSHOT
2/18/2017          Hossein Anderson  [address]  [place] [address]    Dear Hossein:    [facility] wants to ensure your discharge home is safe and you or your loved ones have had all your questions answered regarding your care after you leave the hospital.    You may receive a telephone call within two days of your discharge.  This call is to make certain you understand your discharge instructions as well as ensure we provided you with the best care possible during your stay with us.     The call will only last approximately 3-5 minutes and will be done by a nurse.    Once again, we want to ensure your discharge home is safe and that you have a clear understanding of any next steps in your care.  If you have any questions or concerns, please do not hesitate to contact us, we are here for you.  Thank you for choosing [facility] for your healthcare needs.    Sincerely,    Jamie Hairston    [facility]

## 2017-02-18 NOTE — ED NOTES
".  Chief Complaint   Patient presents with   • Chest Pain     Right side, radiating down right arm x 3 weeks, seen here 3 weeks ago, seen by PCP yesterday     ./99 mmHg  Pulse 87  Temp(Src) 37.1 °C (98.8 °F)  Resp 17  Ht 1.803 m (5' 11\")  Wt 115.8 kg (255 lb 4.7 oz)  BMI 35.62 kg/m2  SpO2 98%    Sent by PCP for uncontrolled right chest pain x 3 weeks, seen here with same complaint on 1/30/17, f/u w/PCP as directed, started taking Metaxalone this afternoon.  EKG done, educated on triage process, placed in lobby with family member, told to inform staff of any changes in condition.    "

## 2017-02-18 NOTE — ED AVS SNAPSHOT
[employer]unch Access Code: [email or web address]  Expires: 3/20/2017  6:09 AM    Your email address is not on file at [facility].  Email Addresses are required for you to sign up for [employer], please contact [phone] to verify your personal information and to provide your email address prior to attempting to register for [employer].    Hossein Hunter Justin  [facility] [facility]  [place] [id number]    SportsCrunch  A secure, online tool to manage your health information     [facility]’s SportsCrunch® is a secure, online tool that connects you to your personalized health information from the privacy of your home -- day or night - making it very easy for you to manage your healthcare. Once the activation process is completed, you can even access your medical information using the SportsCrunch artur, which is available for free in the Apple Artur store or Google Play store.     To learn more about SportsCrunch, visit [email or web address]    There are two levels of access available (as shown below):   My Chart Features  [facility] Primary Care Doctor [facility]  Specialists [facility]  Urgent  Care Non-[facility] Primary Care Doctor   Email your healthcare team securely and privately 24/7 X X X    Manage appointments: schedule your next appointment; view details of past/upcoming appointments X      Request prescription refills. X      View recent personal medical records, including lab and immunizations X X X X   View health record, including health history, allergies, medications X X X X   Read reports about your outpatient visits, procedures, consult and ER notes X X X X   See your discharge summary, which is a recap of your hospital and/or ER visit that includes your diagnosis, lab results, and care plan X X  X     How to register for [employer]unch:  Once your e-mail address has been verified, follow the following steps to sign up for [employer]unch.     1. Go to  [email or web address]  2. Click on the Sign Up Now box, which takes you to the New Member Sign Up page. You  will need to provide the following information:  a. Enter your [employer] Access Code exactly as it appears at the top of this page. (You will not need to use this code after you’ve completed the sign-up process. If you do not sign up before the expiration date, you must request a new code.)   b. Enter your date of birth.   c. Enter your home email address.   d. Click Submit, and follow the next screen’s instructions.  3. Create a [employer] ID. This will be your [employer] login ID and cannot be changed, so think of one that is secure and easy to remember.  4. Create a [employer] password. You can change your password at any time.  5. Enter your Password Reset Question and Answer. This can be used at a later time if you forget your password.   6. Enter your e-mail address. This allows you to receive e-mail notifications when new information is available in [employer].  7. Click Sign Up. You can now view your health information.    For assistance activating your [employer] account, call [phone]

## 2017-02-18 NOTE — ED NOTES
"Pt up to triage room states \"I haven't ate in over 11 hours! I am a diabetic I need something to eat\", pt apologized for wait time and will get dinner box for pt  "

## 2017-02-18 NOTE — ED NOTES
Pt ambulatory to restroom at this time without any difficulty, continues to have chest pain unchanged since arrival

## 2017-02-18 NOTE — ED NOTES
"Pt demands \"I need something for pain! I can't stand it anymore\" pt informed that he has to see the dr prior to any medications being given, all of pts lab work is wnl, ekg was checked off as no stemi by physician, pt continues to wait for eval by   "

## 2017-02-18 NOTE — ED NOTES
Pt moved to triage room 2 for dr to evaluate, pt continues to have chest pain rating 10/10 on numerical pain scale, pain is on rt side of chest down rt arm, seen dr yesterday for same, vss, resp are even and unlabored, pt chart placed up for erp to see, apologized again for wait time

## 2017-02-18 NOTE — ED NOTES
"Pt spouse states \"his doctor said he was going to call in a ct scan for him and make arrangements for that, do you know anything about this\" informed that this nurse does not know anything about this but that erp will be the one to decide on what ct scan is needed  "

## 2017-03-03 ENCOUNTER — TELEPHONE (OUTPATIENT)
Dept: CARDIOLOGY | Facility: MEDICAL CENTER | Age: 62
End: 2017-03-03

## 2017-03-03 ENCOUNTER — OFFICE VISIT (OUTPATIENT)
Dept: CARDIOLOGY | Facility: MEDICAL CENTER | Age: 62
End: 2017-03-03
Payer: COMMERCIAL

## 2017-03-03 VITALS
SYSTOLIC BLOOD PRESSURE: 112 MMHG | HEIGHT: 72 IN | DIASTOLIC BLOOD PRESSURE: 78 MMHG | OXYGEN SATURATION: 86 % | BODY MASS INDEX: 34.54 KG/M2 | HEART RATE: 86 BPM | WEIGHT: 255 LBS

## 2017-03-03 DIAGNOSIS — G47.30 SLEEP DISORDER BREATHING: ICD-10-CM

## 2017-03-03 DIAGNOSIS — E66.9 OBESITY (BMI 35.0-39.9 WITHOUT COMORBIDITY): ICD-10-CM

## 2017-03-03 DIAGNOSIS — R07.9 CHEST PAIN, UNSPECIFIED TYPE: ICD-10-CM

## 2017-03-03 DIAGNOSIS — M54.9 CHRONIC MIDLINE BACK PAIN, UNSPECIFIED BACK LOCATION: ICD-10-CM

## 2017-03-03 DIAGNOSIS — G89.29 CHRONIC MIDLINE BACK PAIN, UNSPECIFIED BACK LOCATION: ICD-10-CM

## 2017-03-03 DIAGNOSIS — M25.511 SEVERE PAIN OF RIGHT SHOULDER: ICD-10-CM

## 2017-03-03 DIAGNOSIS — E11.8 TYPE 2 DIABETES MELLITUS WITH COMPLICATION, WITHOUT LONG-TERM CURRENT USE OF INSULIN (HCC): Chronic | ICD-10-CM

## 2017-03-03 LAB — EKG IMPRESSION: NORMAL

## 2017-03-03 PROCEDURE — 99214 OFFICE O/P EST MOD 30 MIN: CPT | Performed by: INTERNAL MEDICINE

## 2017-03-03 PROCEDURE — 93000 ELECTROCARDIOGRAM COMPLETE: CPT | Performed by: INTERNAL MEDICINE

## 2017-03-03 NOTE — PROGRESS NOTES
Cardiology Consult Note:    Celeste Rodriguez  Date & Time note created:    3/3/2017   12:27 PM       Patient ID:  Name:             Hossein Anderson   YOB: 1955  Age:                 61 y.o.  male   MRN:               [id number]                                                             Chief Complaint: No chief complaint on file.            History of Present Illness:   Hossein Anderson has recent chest pain/pressure but confused with sig neck pain back pain Rt shoulder pain . Started 30 day ago at any am or pm. Last up to hours aggregative by none and relieved by resting. Intensity 8-10/10 radiates to mid chest   with or without dyspnea, with  weakness.   He has DM but not sure if CAD (in his chart); Initial ER EKG has early R/S transition and question of old posterior MI?    Review of Systems:  Neuropathy?   Constitutional: Denies fevers, Denies weight changes  Eyes: Denies changes in vision, no eye pain  Ears/Nose/Throat/Mouth: Denies nasal congestion or sore throat   Cardiovascular:  chest pain or palpitations   Respiratory: Denies shortness of breath , Denies cough  Gastrointestinal/Hepatic: Denies abdominal pain, nausea, vomiting, diarrhea, constipation or GI bleeding   Genitourinary: Denies bladder dysfunction, dysuria or frequency  Musculoskeletal/Rheum: Denies  joint pain and swelling   Skin/Breast: Denies rash, denies breast lumps or discharge  Neurological: Denies headache, confusion, memory loss or focal weakness/parasthesias  Psychiatric: denies mood disorder   Endocrine:  hx of diabetes or thyroid dysfunction  Heme/Oncology/Lymph Nodes: Denies enlarged lymph nodes, denies bruising or known bleeding disorder  Allergic/Immunologic:  hx of allergies      All other systems were reviewed and are negative (AMA/CMS criteria)    Past Medical History:   Past Medical History   Diagnosis Date   • CAD (coronary artery disease)      2 MI in past 15 years ago   • Asthma    • Diabetes  (CMS-[facility])          Past Surgical History:  No past surgical history on file.    Hospital Medications:    Current outpatient prescriptions:   •  sitagliptin (JANUVIA) 100 MG Tab, Take 100 mg by mouth every day., Disp: , Rfl:   •  metaxalone (SKELAXIN) 800 MG Tab, Take 800 mg by mouth 3 times a day., Disp: , Rfl:   •  nitroglycerin (NITROSTAT) 0.4 MG SL Tab, Place 1 Tab under tongue as needed for Chest Pain., Disp: 25 Tab, Rfl: 0  •  metformin (GLUCOPHAGE) 1000 MG tablet, Take 1,000 mg by mouth 2 times a day, with meals., Disp: , Rfl:   •  calcium carbonate (TUMS) 500 MG Chew Tab, Take 500 mg by mouth as needed (For upset stomach)., Disp: , Rfl:   •  tamsulosin (FLOMAX) 0.4 MG capsule, Take 0.4 mg by mouth every bedtime., Disp: , Rfl:   •  aspirin EC (ECOTRIN) 81 MG Tablet Delayed Response, Take 81 mg by mouth every bedtime., Disp: , Rfl:   •  Omega-3 Fatty Acids (FISH OIL) 1000 MG Cap capsule, Take 1,000 mg by mouth every bedtime., Disp: , Rfl:   •  therapeutic multivitamin-minerals (THERAGRAN-M) Tab, Take 1 Tab by mouth every day., Disp: , Rfl:   •  Glucos-Chondroit-Hyaluron-MSM (GLUCOSAMINE CHONDROITIN JOINT PO), Take 1 Tab by mouth every day., Disp: , Rfl:   •  hydrocodone-acetaminophen (NORCO) 5-325 MG Tab per tablet, Take 1-2 Tabs by mouth every four hours as needed. Indications: Moderate to Moderately Severe Pain, Disp: , Rfl:   •  hydrocodone-acetaminophen (NORCO) 5-325 MG Tab per tablet, Take 1-2 Tabs by mouth every four hours as needed., Disp: 20 Tab, Rfl: 0  •  tramadol (ULTRAM) 50 MG Tab, Take 1 Tab by mouth every 6 hours as needed for Moderate Pain., Disp: 20 Tab, Rfl: 0    Current Outpatient Medications:  Current Outpatient Prescriptions   Medication Sig Dispense Refill   • sitagliptin (JANUVIA) 100 MG Tab Take 100 mg by mouth every day.     • metaxalone (SKELAXIN) 800 MG Tab Take 800 mg by mouth 3 times a day.     • nitroglycerin (NITROSTAT) 0.4 MG SL Tab Place 1 Tab under tongue as needed for Chest  "Pain. 25 Tab 0   • metformin (GLUCOPHAGE) 1000 MG tablet Take 1,000 mg by mouth 2 times a day, with meals.     • calcium carbonate (TUMS) 500 MG Chew Tab Take 500 mg by mouth as needed (For upset stomach).     • tamsulosin (FLOMAX) 0.4 MG capsule Take 0.4 mg by mouth every bedtime.     • aspirin EC (ECOTRIN) 81 MG Tablet Delayed Response Take 81 mg by mouth every bedtime.     • Omega-3 Fatty Acids (FISH OIL) 1000 MG Cap capsule Take 1,000 mg by mouth every bedtime.     • therapeutic multivitamin-minerals (THERAGRAN-M) Tab Take 1 Tab by mouth every day.     • Glucos-Chondroit-Hyaluron-MSM (GLUCOSAMINE CHONDROITIN JOINT PO) Take 1 Tab by mouth every day.     • hydrocodone-acetaminophen (NORCO) 5-325 MG Tab per tablet Take 1-2 Tabs by mouth every four hours as needed. Indications: Moderate to Moderately Severe Pain     • hydrocodone-acetaminophen (NORCO) 5-325 MG Tab per tablet Take 1-2 Tabs by mouth every four hours as needed. 20 Tab 0   • tramadol (ULTRAM) 50 MG Tab Take 1 Tab by mouth every 6 hours as needed for Moderate Pain. 20 Tab 0     No current facility-administered medications for this visit.         Medication Allergy/Sensitivities:  Allergies   Allergen Reactions   • Codeine      Pt states he tolerates morphine 2017   • [place] Swelling     Pt states \"My mouth swells up\".         Family History:  No family history of premature CAD  MGF  in sleep in his 70's    Social History:  Social History     Social History   • Marital Status:      Spouse Name: N/A   • Number of Children: N/A   • Years of Education: N/A     Occupational History   • Not on file.     Social History Main Topics   • Smoking status: Never Smoker    • Smokeless tobacco: Not on file   • Alcohol Use: Yes      Comment: rare   • Drug Use: No   • Sexual Activity: Not on file     Other Topics Concern   • Not on file     Social History Narrative         Physical Exam:  Vitals  Weight/BMI: Body mass index is 34.84 kg/(m^2).  Blood pressure " "112/78, pulse 86, height 1.822 m (5' 11.73\"), weight 115.667 kg (255 lb), SpO2 86 %.  Filed Vitals:    03/03/17 1140   BP: 112/78   Pulse: 86   Height: 1.822 m (5' 11.73\")   Weight: 115.667 kg (255 lb)   SpO2: 86%     Oxygen Therapy:  Pulse Oximetry: (!) 86 % Wife suspect sleep disorder, apnea  General Appearance:  Obese;  Well developed, Well nourished, No acute distress, Non-toxic appearance.   HENT:  Normocephalic, Atraumatic, Oropharynx moist mucous membranes, Dentition: Mallampati 4 OP, Nose normal.    Eyes:  PERRLA, EOMI, Conjunctiva normal, No discharge.  Neck:  Normal range of motion, No cervical tenderness, Supple, No stridor, no JVD .  No thyromegaly.  No carotid bruit.  Cardiovascular:  Normal heart rate, Normal rhythm,  S1, S2, no S3,  S4; No gallops; No murmurs, No rubs, .   Extremitites with intact distal pulses, no cyanosis, clubbing or edema.  No heaves, thrills, HJR;  Peripheral pulses: carotid 2+, brachial 2+, radial 2+, ulnar 2+, femoral 2+, popliteal 2+, PT 2+, DP 2+;  Lungs:  Respiratory effort is normal. Normal breath sounds, breath sounds clear to auscultation bilaterally,  no rales, no rhonchi, no wheezing.   Abdomen: Bowel sounds normal, Soft, No tenderness, No guarding, No rebound, No masses, No hepatosplenomegaly.  Skin: Tatoos; Warm, Dry, No erythema, No rash, no induration or crepitus.  Neurologic: Alert & oriented x 3, Normal motor function, Normal sensory function, No focal deficits noted, cranial nerves II through XII are normal,  abnormal gait.with walking cane  Psychiatric: Affect normal, Judgment normal, Mood normal.      Data Review:     Records reviewed and summarized in current documentation    Lab Data Review:  Lab Results   Component Value Date/Time    SODIUM 135 02/17/2017 06:28 PM    POTASSIUM 4.2 02/17/2017 06:28 PM    CHLORIDE 102 02/17/2017 06:28 PM    CO2 22 02/17/2017 06:28 PM    GLUCOSE 145* 02/17/2017 06:28 PM    BUN 16 02/17/2017 06:28 PM    CREATININE 0.82 " 02/17/2017 06:28 PM      Lab Results   Component Value Date/Time    PT 12.3 02/17/2017 06:28 PM    INR 0.89 02/17/2017 06:28 PM      Lab Results   Component Value Date/Time    WBC 8.3 02/17/2017 06:28 PM    RBC 5.33 02/17/2017 06:28 PM    HEMOGLOBIN 15.8 02/17/2017 06:28 PM    HEMATOCRIT 46.6 02/17/2017 06:28 PM    MCV 87.4 02/17/2017 06:28 PM    MCH 29.6 02/17/2017 06:28 PM    MCHC 33.9 02/17/2017 06:28 PM    MPV 10.0 02/17/2017 06:28 PM    NEUTROPHILS-POLYS 34.20* 02/17/2017 06:28 PM    LYMPHOCYTES 52.60* 02/17/2017 06:28 PM    MONOCYTES 4.40 02/17/2017 06:28 PM    EOSINOPHILS 5.20 02/17/2017 06:28 PM    BASOPHILS 0.90 02/17/2017 06:28 PM        Imaging/Procedures Review:    To my review shows:na    EKG To my review shows: SR no avute abn; R/S transition wnl    Assessment and Plan.   61 y.o. male has DM and confusion CP, may or may not be atypical; Pls see orders for w/u's; He is been eval'd by PCP and pain specialist  Discussed with patient the OPO ordered, may order O2 supplement and/or  sleep evaluation if OPO findings are abnormal; the patient will be contacted in the future regarding my advisement.  Ask to quit occ Cigars    1. Chest pain, unspecified type    - [facility] EPIPHANY EKG (Clinic Performed)  - ECHOCARDIOGRAM COMP W/O CONT; Future  - NM-CARDIAC STRESS TEST; Future  - LIPID PANEL    2. Type 2 diabetes mellitus with complication, without long-term current use of insulin ([facility])    - ECHOCARDIOGRAM COMP W/O CONT; Future  - HEMOGLOBIN A1C    3. Severe pain of right shoulder  PCP    4. Chronic midline back pain, unspecified back location    PCP  5. Sleep disorder breathing    - OVERNIGHT PULSE OXIMETRY    6. Obesity (BMI 35.0-39.9 without comorbidity) ([facility])  Discussed risks and educated about the subject related matters    - OVERNIGHT PULSE OXIMETRY      1. Chest pain, unspecified type  [facility] EPIPHANY EKG (Clinic Performed)    ECHOCARDIOGRAM COMP W/O CONT    NM-CARDIAC STRESS TEST    LIPID PANEL    CANCELED: SKYLER  EKG (Clinic Performed)   2. Type 2 diabetes mellitus with complication, without long-term current use of insulin ([facility])  ECHOCARDIOGRAM COMP W/O CONT    HEMOGLOBIN A1C   3. Severe pain of right shoulder     4. Chronic midline back pain, unspecified back location     5. Sleep disorder breathing  OVERNIGHT PULSE OXIMETRY   6. Obesity (BMI 35.0-39.9 without comorbidity) ([facility])  OVERNIGHT PULSE OXIMETRY

## 2017-03-03 NOTE — MR AVS SNAPSHOT
"        Hossein Anderson   3/3/2017 11:30 AM   Office Visit   MRN: [id number]    Department:  Heart [facility] [place]   Dept Phone:  [phone]    Description:  Male : 1955   Provider:  Celeste Rodriguez MD,[facility]           Allergies as of 3/3/2017     Allergen Noted Reactions    Codeine 2017       Pt states he tolerates morphine 2017    [place] 2017   Swelling    Pt states \"My mouth swells up\".        You were diagnosed with     Chest pain, unspecified type   [2511255]       Type 2 diabetes mellitus with complication, without long-term current use of insulin (CMS-[facility])   [6354399]       Severe pain of right shoulder   [5003415]       Chronic midline back pain, unspecified back location   [3005784]       Sleep disorder breathing   [605649]       Obesity (BMI 35.0-39.9 without comorbidity) ([facility])   [080099]         Vital Signs     Blood Pressure Pulse Height Weight Body Mass Index Oxygen Saturation    112/78 mmHg 86 1.822 m (5' 11.73\") 115.667 kg (255 lb) 34.84 kg/m2 86%    Smoking Status                   Never Smoker            Basic Information     Date Of Birth Sex Race Ethnicity Preferred Language    1955 Male White Non- English      Your appointments     Mar 29, 2017  7:15 AM   ECHO with ECHO [facility], [facility] EXAM 9   ECHOCARDIOLOGY [facility] ([facility])    [address] [place] [address]   [phone]           No prep            May 15, 2017  8:00 AM   NM CARDIAC STRESS TEST (30) with [facility] DSPECT 2   [facility])    [address] [place] [id number]   [phone]           NPO for 4 hours prior to scan.  No caffeine for 24 hours prior to test (decaf, coffee, cola, tea, chocolate, Excedrin, and Anacin).  No Viagra or other ED meds for 48 hours.  Warn patient of length of test and to bring list of meds.            May 15, 2017 12:45 PM   FOLLOW UP with [facility] Rodriguez MD,[facility] [place] (--)    [address]  [place] [address] [phone]              Problem List              ICD-10-CM Priority Class Noted - Resolved    Chest pain R07.9 High  1/30/2017 - Present    DM (diabetes mellitus) (CMS-HCC) (Chronic) E11.9   1/30/2017 - Present    CAD (coronary artery disease) (Chronic) I25.10   1/30/2017 - Present    BPH (benign prostatic hypertrophy) (Chronic) N40.0   1/30/2017 - Present      Health Maintenance        Date Due Completion Dates    IMM HEP B VACCINE (1 of 3 - Primary Series) 1955 ---    DIABETES MONOFILAMENT / LE EXAM 1955 ---    RETINAL SCREENING 6/20/1975 ---    IMM DTaP/Tdap/Td Vaccine (1 - Tdap) 6/20/1976 ---    COLONOSCOPY 6/20/2007 ---    A1C SCREENING 4/2/2014 10/2/2013, 11/5/2012, 3/12/2012, 11/26/2011    FASTING LIPID PROFILE 10/2/2014 10/2/2013, 11/5/2012, 3/12/2012, 11/26/2011, 6/8/2011    URINE ACR / MICROALBUMIN 10/2/2014 10/2/2013, 11/5/2012, 3/12/2012, 11/26/2011, 6/8/2011    SERUM CREATININE 2/17/2018 2/17/2017, 1/30/2017, 10/2/2013, 11/5/2012, 3/12/2012, 11/26/2011, 6/8/2011            Results       Current Immunizations     Influenza Vaccine Quad Inj (Preserved) 11/14/2016    Pneumococcal polysaccharide vaccine (PPSV-23) 1/30/2017  4:04 PM      Below and/or attached are the medications your provider expects you to take. Review all of your home medications and newly ordered medications with your provider and/or pharmacist. Follow medication instructions as directed by your provider and/or pharmacist. Please keep your medication list with you and share with your provider. Update the information when medications are discontinued, doses are changed, or new medications (including over-the-counter products) are added; and carry medication information at all times in the event of emergency situations     Allergies:  CODEINE - (reactions not documented)     ORANGE - Swelling               Medications  Valid as of: March 03, 2017 - 12:38 PM      Generic Name Brand Name Tablet Size Instructions for use    Aspirin (Tablet Delayed Response) ECOTRIN 81 MG Take 81 mg by mouth every bedtime.        Calcium Carbonate Antacid (Chew Tab) TUMS 500 MG Take 500 mg by mouth as needed (For upset stomach).        Glucos-Chondroit-Hyaluron-MSM   Take 1 Tab by mouth every day.        Hydrocodone-Acetaminophen (Tab) NORCO 5-325 MG Take 1-2 Tabs by mouth every four hours as needed. Indications: Moderate to Moderately Severe Pain        Hydrocodone-Acetaminophen (Tab) NORCO 5-325 MG Take 1-2 Tabs by mouth every four hours as needed.        Metaxalone (Tab) SKELAXIN 800 MG Take 800 mg by mouth 3 times a day.        MetFORMIN HCl (Tab) GLUCOPHAGE 1000 MG Take 1,000 mg by mouth 2 times a day, with meals.        Multiple Vitamins-Minerals (Tab) THERAGRAN-M  Take 1 Tab by mouth every day.        Nitroglycerin (SL Tab) NITROSTAT 0.4 MG Place 1 Tab under tongue as needed for Chest Pain.        Omega-3 Fatty Acids (Cap) fish oil 1000 MG Take 1,000 mg by mouth every bedtime.        SITagliptin Phosphate (Tab) JANUVIA 100 MG Take 100 mg by mouth every day.        Tamsulosin HCl (Cap) FLOMAX 0.4 MG Take 0.4 mg by mouth every bedtime.        TraMADol HCl (Tab) ULTRAM 50 MG Take 1 Tab by mouth every 6 hours as needed for Moderate Pain.        .                 Medicines prescribed today were sent to:     [id number] [place] - [address]    [address] [place] [address]    Phone: [phone] Fax: [phone]    Open 24 Hours?: No      Medication refill instructions:       If your prescription bottle indicates you have medication refills left, it is not necessary to call your provider’s office. Please contact your pharmacy and they will refill your medication.    If your prescription bottle indicates you do not have any refills left, you may request refills at any time through one of the following ways: The online The Local system (except Urgent Care), by calling  your provider’s office, or by asking your pharmacy to contact your provider’s office with a refill request. Medication refills are processed only during regular business hours and may not be available until the next business day. Your provider may request additional information or to have a follow-up visit with you prior to refilling your medication.   *Please Note: Medication refills are assigned a new Rx number when refilled electronically. Your pharmacy may indicate that no refills were authorized even though a new prescription for the same medication is available at the pharmacy. Please request the medicine by name with the pharmacy before contacting your provider for a refill.        Your To Do List     Future Labs/Procedures Complete By Expires    ECHOCARDIOGRAM COMP W/O CONT  As directed 3/3/2018    NM-CARDIAC STRESS TEST  As directed 8/31/2017         DOCUSYS Access Code: [email or web address]  Expires: 3/20/2017  6:09 AM    DOCUSYS  A secure, online tool to manage your health information     [employer]’s DOCUSYS® is a secure, online tool that connects you to your personalized health information from the privacy of your home -- day or night - making it very easy for you to manage your healthcare. Once the activation process is completed, you can even access your medical information using the DOCUSYS artur, which is available for free in the Apple Artur store or Google Play store.     DOCUSYS provides the following levels of access (as shown below):   My Chart Features   [facility] Doctor [facility]  Urgent  Care Non-[facility]  Primary Care  Doctor   Email your healthcare team securely and privately 24/7 X X X    Manage appointments: schedule your next appointment; view details of past/upcoming appointments X      Request prescription refills. X      View recent personal medical records, including lab and immunizations X X X X   View health record, including health history, allergies,  medications X X X X   Read reports about your outpatient visits, procedures, consult and ER notes X X X X   See your discharge summary, which is a recap of your hospital and/or ER visit that includes your diagnosis, lab results, and care plan. X X       How to register for [employer]:  1. Go to  [email or web address].  2. Click on the Sign Up Now box, which takes you to the New Member Sign Up page. You will need to provide the following information:  a. Enter your MentorMob Access Code exactly as it appears at the top of this page. (You will not need to use this code after you’ve completed the sign-up process. If you do not sign up before the expiration date, you must request a new code.)   b. Enter your date of birth.   c. Enter your home email address.   d. Click Submit, and follow the next screen’s instructions.  3. Create a MentorMob ID. This will be your MentorMob login ID and cannot be changed, so think of one that is secure and easy to remember.  4. Create a [employer]orMob password. You can change your password at any time.  5. Enter your Password Reset Question and Answer. This can be used at a later time if you forget your password.   6. Enter your e-mail address. This allows you to receive e-mail notifications when new information is available in MentorMob.  7. Click Sign Up. You can now view your health information.    For assistance activating your [employer]orM[employer] account, call [phone]

## 2017-03-03 NOTE — Clinical Note
[facility]   [address]  [place] [address]  Phone: [phone]  Fax: [phone]              Hossein Anderson  1955    Encounter Date: 3/3/2017    Branden ROJAS M.D.    Thank you for the referral. I had the pleasure of seeing Hossein Anderson today in cardiology clinic. I've attached my visit note below. If you have any questions please feel free to give me a call anytime.      Celeste Rodriguez MD, PhD, [facility]  Cardiology and Lipidology  [facility]                                                                    PROGRESS NOTE:  Cardiology Consult Note:    Celeste Rodriguez  Date & Time note created:    3/3/2017   12:27 PM       Patient ID:  Name:             Hossein Anderson   YOB: 1955  Age:                 61 y.o.  male   MRN:               [id number]                                                             Chief Complaint: No chief complaint on file.            History of Present Illness:   Hossein Anderson has recent chest pain/pressure but confused with sig neck pain back pain Rt shoulder pain . Started 30 day ago at any am or pm. Last up to hours aggregative by none and relieved by resting. Intensity 8-10/10 radiates to mid chest   with or without dyspnea, with  weakness.   He has DM but not sure if CAD (in his chart); Initial ER EKG has early R/S transition and question of old posterior MI?    Review of Systems:  Neuropathy?   Constitutional: Denies fevers, Denies weight changes  Eyes: Denies changes in vision, no eye pain  Ears/Nose/Throat/Mouth: Denies nasal congestion or sore throat   Cardiovascular:  chest pain or palpitations   Respiratory: Denies shortness of breath , Denies cough  Gastrointestinal/Hepatic: Denies abdominal pain, nausea, vomiting, diarrhea, constipation or GI bleeding   Genitourinary: Denies bladder dysfunction, dysuria or  frequency  Musculoskeletal/Rheum: Denies  joint pain and swelling   Skin/Breast: Denies rash, denies breast lumps or discharge  Neurological: Denies headache, confusion, memory loss or focal weakness/parasthesias  Psychiatric: denies mood disorder   Endocrine:  hx of diabetes or thyroid dysfunction  Heme/Oncology/Lymph Nodes: Denies enlarged lymph nodes, denies bruising or known bleeding disorder  Allergic/Immunologic:  hx of allergies      All other systems were reviewed and are negative (AMA/CMS criteria)    Past Medical History:   Past Medical History   Diagnosis Date   • CAD (coronary artery disease)      2 MI in past 15 years ago   • Asthma    • Diabetes (CMS-HCC)          Past Surgical History:  No past surgical history on file.    Hospital Medications:    Current outpatient prescriptions:   •  sitagliptin (JANUVIA) 100 MG Tab, Take 100 mg by mouth every day., Disp: , Rfl:   •  metaxalone (SKELAXIN) 800 MG Tab, Take 800 mg by mouth 3 times a day., Disp: , Rfl:   •  nitroglycerin (NITROSTAT) 0.4 MG SL Tab, Place 1 Tab under tongue as needed for Chest Pain., Disp: 25 Tab, Rfl: 0  •  metformin (GLUCOPHAGE) 1000 MG tablet, Take 1,000 mg by mouth 2 times a day, with meals., Disp: , Rfl:   •  calcium carbonate (TUMS) 500 MG Chew Tab, Take 500 mg by mouth as needed (For upset stomach)., Disp: , Rfl:   •  tamsulosin (FLOMAX) 0.4 MG capsule, Take 0.4 mg by mouth every bedtime., Disp: , Rfl:   •  aspirin EC (ECOTRIN) 81 MG Tablet Delayed Response, Take 81 mg by mouth every bedtime., Disp: , Rfl:   •  Omega-3 Fatty Acids (FISH OIL) 1000 MG Cap capsule, Take 1,000 mg by mouth every bedtime., Disp: , Rfl:   •  therapeutic multivitamin-minerals (THERAGRAN-M) Tab, Take 1 Tab by mouth every day., Disp: , Rfl:   •  Glucos-Chondroit-Hyaluron-MSM (GLUCOSAMINE CHONDROITIN JOINT PO), Take 1 Tab by mouth every day., Disp: , Rfl:   •  hydrocodone-acetaminophen (NORCO) 5-325 MG Tab per tablet, Take 1-2 Tabs by mouth every four hours  "as needed. Indications: Moderate to Moderately Severe Pain, Disp: , Rfl:   •  hydrocodone-acetaminophen (NORCO) 5-325 MG Tab per tablet, Take 1-2 Tabs by mouth every four hours as needed., Disp: 20 Tab, Rfl: 0  •  tramadol (ULTRAM) 50 MG Tab, Take 1 Tab by mouth every 6 hours as needed for Moderate Pain., Disp: 20 Tab, Rfl: 0    Current Outpatient Medications:  Current Outpatient Prescriptions   Medication Sig Dispense Refill   • sitagliptin (JANUVIA) 100 MG Tab Take 100 mg by mouth every day.     • metaxalone (SKELAXIN) 800 MG Tab Take 800 mg by mouth 3 times a day.     • nitroglycerin (NITROSTAT) 0.4 MG SL Tab Place 1 Tab under tongue as needed for Chest Pain. 25 Tab 0   • metformin (GLUCOPHAGE) 1000 MG tablet Take 1,000 mg by mouth 2 times a day, with meals.     • calcium carbonate (TUMS) 500 MG Chew Tab Take 500 mg by mouth as needed (For upset stomach).     • tamsulosin (FLOMAX) 0.4 MG capsule Take 0.4 mg by mouth every bedtime.     • aspirin EC (ECOTRIN) 81 MG Tablet Delayed Response Take 81 mg by mouth every bedtime.     • Omega-3 Fatty Acids (FISH OIL) 1000 MG Cap capsule Take 1,000 mg by mouth every bedtime.     • therapeutic multivitamin-minerals (THERAGRAN-M) Tab Take 1 Tab by mouth every day.     • Glucos-Chondroit-Hyaluron-MSM (GLUCOSAMINE CHONDROITIN JOINT PO) Take 1 Tab by mouth every day.     • hydrocodone-acetaminophen (NORCO) 5-325 MG Tab per tablet Take 1-2 Tabs by mouth every four hours as needed. Indications: Moderate to Moderately Severe Pain     • hydrocodone-acetaminophen (NORCO) 5-325 MG Tab per tablet Take 1-2 Tabs by mouth every four hours as needed. 20 Tab 0   • tramadol (ULTRAM) 50 MG Tab Take 1 Tab by mouth every 6 hours as needed for Moderate Pain. 20 Tab 0     No current facility-administered medications for this visit.         Medication Allergy/Sensitivities:  Allergies   Allergen Reactions   • Codeine      Pt states he tolerates morphine 1/2017   • Sarpy Swelling     Pt states \"My " "mouth swells up\".         Family History:  No family history of premature CAD  MGF  in sleep in his 70's    Social History:  Social History     Social History   • Marital Status:      Spouse Name: N/A   • Number of Children: N/A   • Years of Education: N/A     Occupational History   • Not on file.     Social History Main Topics   • Smoking status: Never Smoker    • Smokeless tobacco: Not on file   • Alcohol Use: Yes      Comment: rare   • Drug Use: No   • Sexual Activity: Not on file     Other Topics Concern   • Not on file     Social History Narrative         Physical Exam:  Vitals  Weight/BMI: Body mass index is 34.84 kg/(m^2).  Blood pressure 112/78, pulse 86, height 1.822 m (5' 11.73\"), weight 115.667 kg (255 lb), SpO2 86 %.  Filed Vitals:    17 1140   BP: 112/78   Pulse: 86   Height: 1.822 m (5' 11.73\")   Weight: 115.667 kg (255 lb)   SpO2: 86%     Oxygen Therapy:  Pulse Oximetry: (!) 86 % Wife suspect sleep disorder, apnea  General Appearance:  Obese;  Well developed, Well nourished, No acute distress, Non-toxic appearance.   HENT:  Normocephalic, Atraumatic, Oropharynx moist mucous membranes, Dentition: Mallampati 4 OP, Nose normal.    Eyes:  PERRLA, EOMI, Conjunctiva normal, No discharge.  Neck:  Normal range of motion, No cervical tenderness, Supple, No stridor, no JVD .  No thyromegaly.  No carotid bruit.  Cardiovascular:  Normal heart rate, Normal rhythm,  S1, S2, no S3,  S4; No gallops; No murmurs, No rubs, .   Extremitites with intact distal pulses, no cyanosis, clubbing or edema.  No heaves, thrills, HJR;  Peripheral pulses: carotid 2+, brachial 2+, radial 2+, ulnar 2+, femoral 2+, popliteal 2+, PT 2+, DP 2+;  Lungs:  Respiratory effort is normal. Normal breath sounds, breath sounds clear to auscultation bilaterally,  no rales, no rhonchi, no wheezing.   Abdomen: Bowel sounds normal, Soft, No tenderness, No guarding, No rebound, No masses, No hepatosplenomegaly.  Skin: Tatoos; Warm, " Dry, No erythema, No rash, no induration or crepitus.  Neurologic: Alert & oriented x 3, Normal motor function, Normal sensory function, No focal deficits noted, cranial nerves II through XII are normal,  abnormal gait.with walking cane  Psychiatric: Affect normal, Judgment normal, Mood normal.      Data Review:     Records reviewed and summarized in current documentation    Lab Data Review:  Lab Results   Component Value Date/Time    SODIUM 135 02/17/2017 06:28 PM    POTASSIUM 4.2 02/17/2017 06:28 PM    CHLORIDE 102 02/17/2017 06:28 PM    CO2 22 02/17/2017 06:28 PM    GLUCOSE 145* 02/17/2017 06:28 PM    BUN 16 02/17/2017 06:28 PM    CREATININE 0.82 02/17/2017 06:28 PM      Lab Results   Component Value Date/Time    PT 12.3 02/17/2017 06:28 PM    INR 0.89 02/17/2017 06:28 PM      Lab Results   Component Value Date/Time    WBC 8.3 02/17/2017 06:28 PM    RBC 5.33 02/17/2017 06:28 PM    HEMOGLOBIN 15.8 02/17/2017 06:28 PM    HEMATOCRIT 46.6 02/17/2017 06:28 PM    MCV 87.4 02/17/2017 06:28 PM    MCH 29.6 02/17/2017 06:28 PM    MCHC 33.9 02/17/2017 06:28 PM    MPV 10.0 02/17/2017 06:28 PM    NEUTROPHILS-POLYS 34.20* 02/17/2017 06:28 PM    LYMPHOCYTES 52.60* 02/17/2017 06:28 PM    MONOCYTES 4.40 02/17/2017 06:28 PM    EOSINOPHILS 5.20 02/17/2017 06:28 PM    BASOPHILS 0.90 02/17/2017 06:28 PM        Imaging/Procedures Review:    To my review shows:na    EKG To my review shows: SR no avute abn; R/S transition wnl    Assessment and Plan.   61 y.o. male has DM and confusion CP, may or may not be atypical; Pls see orders for w/u's; He is been eval'd by PCP and pain specialist  Discussed with patient the OPO ordered, may order O2 supplement and/or  sleep evaluation if OPO findings are abnormal; the patient will be contacted in the future regarding my advisement.  Ask to quit occ Cigars    1. Chest pain, unspecified type    - [facility] EPIPHANY EKG (Clinic Performed)  - ECHOCARDIOGRAM COMP W/O CONT; Future  - NM-CARDIAC STRESS TEST;  Future  - LIPID PANEL    2. Type 2 diabetes mellitus with complication, without long-term current use of insulin ([facility])    - ECHOCARDIOGRAM COMP W/O CONT; Future  - HEMOGLOBIN A1C    3. Severe pain of right shoulder  PCP    4. Chronic midline back pain, unspecified back location    PCP  5. Sleep disorder breathing    - OVERNIGHT PULSE OXIMETRY    6. Obesity (BMI 35.0-39.9 without comorbidity) ([facility])  Discussed risks and educated about the subject related matters    - OVERNIGHT PULSE OXIMETRY      1. Chest pain, unspecified type  [facility] EPIPHANY EKG (Clinic Performed)    ECHOCARDIOGRAM COMP W/O CONT    NM-CARDIAC STRESS TEST    LIPID PANEL    CANCELED: [facility] EKG (Clinic Performed)   2. Type 2 diabetes mellitus with complication, without long-term current use of insulin ([facility])  ECHOCARDIOGRAM COMP W/O CONT    HEMOGLOBIN A1C   3. Severe pain of right shoulder     4. Chronic midline back pain, unspecified back location     5. Sleep disorder breathing  OVERNIGHT PULSE OXIMETRY   6. Obesity (BMI 35.0-39.9 without comorbidity) ([facility])  OVERNIGHT PULSE OXIMETRY         Branden ROJAS M.D.  [address] [facility]  [place] [address]  VIA Facsimile: [phone]

## 2017-03-29 ENCOUNTER — HOSPITAL ENCOUNTER (OUTPATIENT)
Dept: CARDIOLOGY | Facility: MEDICAL CENTER | Age: 62
End: 2017-03-29
Attending: INTERNAL MEDICINE
Payer: COMMERCIAL

## 2017-03-29 DIAGNOSIS — R07.9 CHEST PAIN, UNSPECIFIED TYPE: ICD-10-CM

## 2017-03-29 DIAGNOSIS — E11.8 TYPE 2 DIABETES MELLITUS WITH COMPLICATION, WITHOUT LONG-TERM CURRENT USE OF INSULIN (HCC): Chronic | ICD-10-CM

## 2017-03-29 PROCEDURE — 93306 TTE W/DOPPLER COMPLETE: CPT

## 2017-03-29 PROCEDURE — 93306 TTE W/DOPPLER COMPLETE: CPT | Mod: 26 | Performed by: INTERNAL MEDICINE

## 2017-03-30 LAB
LV EJECT FRACT  99904: 65
LV EJECT FRACT MOD 2C 99903: 48.38
LV EJECT FRACT MOD 4C 99902: 54.79
LV EJECT FRACT MOD BP 99901: 51.54

## 2017-03-31 ENCOUNTER — TELEPHONE (OUTPATIENT)
Dept: CARDIOLOGY | Facility: MEDICAL CENTER | Age: 62
End: 2017-03-31

## 2017-03-31 NOTE — TELEPHONE ENCOUNTER
Called patient and spoke with both him and his wife regarding echocardiogram results.   Reminded them about getting labs done 1 week prior to f/u appt 5/15 and also about stress test on 5/15 at [facility] and then f/u with Dr. Rodriguez at [facility] later that same day.  Both verbalized understanding and will call if any questions or concerns arise from now until f/u appt.

## 2017-09-30 ENCOUNTER — OFFICE VISIT (OUTPATIENT)
Dept: URGENT CARE | Facility: PHYSICIAN GROUP | Age: 62
End: 2017-09-30
Payer: COMMERCIAL

## 2017-09-30 VITALS
RESPIRATION RATE: 16 BRPM | WEIGHT: 240 LBS | SYSTOLIC BLOOD PRESSURE: 120 MMHG | OXYGEN SATURATION: 95 % | TEMPERATURE: 98.5 F | HEART RATE: 86 BPM | DIASTOLIC BLOOD PRESSURE: 80 MMHG | BODY MASS INDEX: 33.6 KG/M2 | HEIGHT: 71 IN

## 2017-09-30 DIAGNOSIS — R21 RASH OF BODY: ICD-10-CM

## 2017-09-30 DIAGNOSIS — B02.9 HERPES ZOSTER WITHOUT COMPLICATION: Primary | ICD-10-CM

## 2017-09-30 PROCEDURE — 99214 OFFICE O/P EST MOD 30 MIN: CPT | Performed by: PHYSICIAN ASSISTANT

## 2017-09-30 RX ORDER — HYDROCODONE BITARTRATE AND ACETAMINOPHEN 5; 325 MG/1; MG/1
1 TABLET ORAL EVERY 4 HOURS PRN
Qty: 20 TAB | Refills: 0 | Status: SHIPPED | OUTPATIENT
Start: 2017-09-30 | End: 2019-03-08

## 2017-09-30 RX ORDER — ACYCLOVIR 800 MG/1
800 TABLET ORAL
Qty: 35 TAB | Refills: 0 | Status: SHIPPED | OUTPATIENT
Start: 2017-09-30 | End: 2017-10-07

## 2017-09-30 ASSESSMENT — PAIN SCALES - GENERAL: PAINLEVEL: 7=MODERATE-SEVERE PAIN

## 2017-09-30 NOTE — PATIENT INSTRUCTIONS
Shingles  Shingles, which is also known as herpes zoster, is an infection that causes a painful skin rash and fluid-filled blisters. Shingles is not related to genital herpes, which is a sexually transmitted infection.     Shingles only develops in people who:  · Have had chickenpox.  · Have received the chickenpox vaccine. (This is rare.)  CAUSES  Shingles is caused by varicella-zoster virus (VZV). This is the same virus that causes chickenpox. After exposure to VZV, the virus stays in the body in an inactive (dormant) state. Shingles develops if the virus reactivates. This can happen many years after the initial exposure to VZV. It is not known what causes this virus to reactivate.  RISK FACTORS  People who have had chickenpox or received the chickenpox vaccine are at risk for shingles. Infection is more common in people who:  · Are older than age 50.  · Have a weakened defense (immune) system, such as those with HIV, AIDS, or cancer.  · Are taking medicines that weaken the immune system, such as transplant medicines.  · Are under great stress.  SYMPTOMS  Early symptoms of this condition include itching, tingling, and pain in an area on your skin. Pain may be described as burning, stabbing, or throbbing.  A few days or weeks after symptoms start, a painful red rash appears, usually on one side of the body in a bandlike or beltlike pattern. The rash eventually turns into fluid-filled blisters that break open, scab over, and dry up in about 2-3 weeks.  At any time during the infection, you may also develop:  · A fever.  · Chills.  · A headache.  · An upset stomach.  DIAGNOSIS  This condition is diagnosed with a skin exam. Sometimes, skin or fluid samples are taken from the blisters before a diagnosis is made. These samples are examined under a microscope or sent to a lab for testing.  TREATMENT  There is no specific cure for this condition. Your health care provider will probably prescribe medicines to help you  manage pain, recover more quickly, and avoid long-term problems. Medicines may include:  · Antiviral drugs.  · Anti-inflammatory drugs.  · Pain medicines.  If the area involved is on your face, you may be referred to a specialist, such as an eye doctor (ophthalmologist) or an ear, nose, and throat (ENT) doctor to help you avoid eye problems, chronic pain, or disability.  HOME CARE INSTRUCTIONS  Medicines  · Take medicines only as directed by your health care provider.  · Apply an anti-itch or numbing cream to the affected area as directed by your health care provider.  Blister and Rash Care  · Take a cool bath or apply cool compresses to the area of the rash or blisters as directed by your health care provider. This may help with pain and itching.  · Keep your rash covered with a loose bandage (dressing). Wear loose-fitting clothing to help ease the pain of material rubbing against the rash.  · Keep your rash and blisters clean with mild soap and cool water or as directed by your health care provider.  · Check your rash every day for signs of infection. These include redness, swelling, and pain that lasts or increases.  · Do not pick your blisters.  · Do not scratch your rash.  General Instructions  · Rest as directed by your health care provider.  · Keep all follow-up visits as directed by your health care provider. This is important.  · Until your blisters scab over, your infection can cause chickenpox in people who have never had it or been vaccinated against it. To prevent this from happening, avoid contact with other people, especially:  ¨ Babies.  ¨ Pregnant women.  ¨ Children who have eczema.  ¨ Elderly people who have transplants.  ¨ People who have chronic illnesses, such as leukemia or AIDS.  SEEK MEDICAL CARE IF:  · Your pain is not relieved with prescribed medicines.  · Your pain does not get better after the rash heals.  · Your rash looks infected. Signs of infection include redness, swelling, and pain  that lasts or increases.  SEEK IMMEDIATE MEDICAL CARE IF:  · The rash is on your face or nose.  · You have facial pain, pain around your eye area, or loss of feeling on one side of your face.  · You have ear pain or you have ringing in your ear.  · You have loss of taste.  · Your condition gets worse.     This information is not intended to replace advice given to you by your health care provider. Make sure you discuss any questions you have with your health care provider.     Document Released: 12/18/2006 Document Revised: 01/08/2016 Document Reviewed: 10/29/2015  [employer] Interactive Patient Education ©2016 [employer]

## 2017-10-05 ASSESSMENT — ENCOUNTER SYMPTOMS
WHEEZING: 0
SHORTNESS OF BREATH: 0
SORE THROAT: 0
COUGH: 0
FEVER: 0

## 2017-10-05 NOTE — PROGRESS NOTES
"Subjective:      Hossein Anderson is a 62 y.o. male who presents with Herpes Zoster and Allergic Rhinitis (wants a shot)            PT co shingles rash to right side, states he has had this before.  PT states he would also like a kenalog shot for his allergies.        Rash   This is a new problem. The current episode started in the past 7 days. The problem has been gradually worsening since onset. The affected locations include the torso. The rash is characterized by blistering, burning, redness and pain. He was exposed to nothing. Pertinent negatives include no congestion, cough, facial edema, fever, shortness of breath or sore throat. Past treatments include nothing. The treatment provided no relief. His past medical history is significant for allergies and varicella.       Review of Systems   Constitutional: Negative for fever.   HENT: Negative for congestion and sore throat.    Respiratory: Negative for cough, shortness of breath and wheezing.    Skin: Positive for rash.   Endo/Heme/Allergies: Positive for environmental allergies.   All other systems reviewed and are negative.         Objective:     /80   Pulse 86   Temp 36.9 °C (98.5 °F)   Resp 16   Ht 1.803 m (5' 11\")   Wt 108.9 kg (240 lb)   SpO2 95%   BMI 33.47 kg/m²      Physical Exam   Constitutional: He is oriented to person, place, and time. He appears well-developed and well-nourished. No distress.   HENT:   Head: Normocephalic and atraumatic.   Nose: Nose normal.   Eyes: Conjunctivae and EOM are normal. Pupils are equal, round, and reactive to light.   Neck: Normal range of motion. Neck supple.   Cardiovascular: Normal rate, regular rhythm and normal heart sounds.    Pulmonary/Chest: Effort normal and breath sounds normal.   Musculoskeletal: Normal range of motion.   Neurological: He is alert and oriented to person, place, and time.   Skin: Skin is warm and dry. Rash noted. Rash is vesicular.        Vesicular rash in dermatome " distribution consistent with shingles rash.     Psychiatric: He has a normal mood and affect.   Nursing note and vitals reviewed.           Assessment/Plan:     1. Herpes zoster without complication  acyclovir (ZOVIRAX) 800 MG Tab    hydrocodone-acetaminophen (NORCO) 5-325 MG Tab per tablet   2. Rash of body  acyclovir (ZOVIRAX) 800 MG Tab    hydrocodone-acetaminophen (NORCO) 5-325 MG Tab per tablet     PT can see PCP for kenalog shot after shingles rash has resolved, pt agrees.     PT instructed not to drive or operate heavy machinery while taking this medication as it causes drowsiness.  PT also instructed not to drink alcohol while taking this medication because it contains narcotics. PT verbalized understanding of these instructions.     PT should follow up with PCP in 1-2 days for re-evaluation if symptoms have not improved.  Discussed red flags and reasons to return to UC or ED.  Pt and/or family verbalized understanding of diagnosis and follow up instructions and was given informational handout on diagnosis.  PT discharged.

## 2019-03-07 ENCOUNTER — APPOINTMENT (OUTPATIENT)
Dept: ADMISSIONS | Facility: MEDICAL CENTER | Age: 64
End: 2019-03-07
Payer: COMMERCIAL

## 2019-03-08 ENCOUNTER — APPOINTMENT (OUTPATIENT)
Dept: ADMISSIONS | Facility: MEDICAL CENTER | Age: 64
End: 2019-03-08
Attending: ORTHOPAEDIC SURGERY
Payer: COMMERCIAL

## 2019-03-08 NOTE — OR NURSING
Hx and meds reviewed, pre op instructions given. Pt aware may take the listed meds morning of surgery; NTG if needed . Anesthesia and Dr Mae's fasting guidelines reviewed with pt

## 2019-03-14 ENCOUNTER — HOSPITAL ENCOUNTER (OUTPATIENT)
Facility: MEDICAL CENTER | Age: 64
End: 2019-03-14
Attending: ORTHOPAEDIC SURGERY | Admitting: ORTHOPAEDIC SURGERY
Payer: COMMERCIAL

## 2019-03-14 ENCOUNTER — ANESTHESIA EVENT (OUTPATIENT)
Dept: SURGERY | Facility: MEDICAL CENTER | Age: 64
End: 2019-03-14
Payer: COMMERCIAL

## 2019-03-14 ENCOUNTER — ANESTHESIA (OUTPATIENT)
Dept: SURGERY | Facility: MEDICAL CENTER | Age: 64
End: 2019-03-14
Payer: COMMERCIAL

## 2019-03-14 VITALS
HEART RATE: 83 BPM | WEIGHT: 238.1 LBS | DIASTOLIC BLOOD PRESSURE: 82 MMHG | HEIGHT: 72 IN | SYSTOLIC BLOOD PRESSURE: 116 MMHG | OXYGEN SATURATION: 90 % | BODY MASS INDEX: 32.25 KG/M2 | RESPIRATION RATE: 16 BRPM | TEMPERATURE: 98.2 F

## 2019-03-14 DIAGNOSIS — M75.122 COMPLETE TEAR OF LEFT ROTATOR CUFF: ICD-10-CM

## 2019-03-14 PROBLEM — I63.9 CVA (CEREBRAL VASCULAR ACCIDENT) (HCC): Status: ACTIVE | Noted: 2019-03-14

## 2019-03-14 LAB — GLUCOSE BLD-MCNC: 157 MG/DL (ref 65–99)

## 2019-03-14 PROCEDURE — 160046 HCHG PACU - 1ST 60 MINS PHASE II: Performed by: ORTHOPAEDIC SURGERY

## 2019-03-14 PROCEDURE — 160025 RECOVERY II MINUTES (STATS): Performed by: ORTHOPAEDIC SURGERY

## 2019-03-14 PROCEDURE — 500028 HCHG ARTHROWAND TURBOVAC 3.5/90 SUCT.: Performed by: ORTHOPAEDIC SURGERY

## 2019-03-14 PROCEDURE — 160035 HCHG PACU - 1ST 60 MINS PHASE I: Performed by: ORTHOPAEDIC SURGERY

## 2019-03-14 PROCEDURE — 700111 HCHG RX REV CODE 636 W/ 250 OVERRIDE (IP)

## 2019-03-14 PROCEDURE — 160047 HCHG PACU  - EA ADDL 30 MINS PHASE II: Performed by: ORTHOPAEDIC SURGERY

## 2019-03-14 PROCEDURE — 700111 HCHG RX REV CODE 636 W/ 250 OVERRIDE (IP): Performed by: ANESTHESIOLOGY

## 2019-03-14 PROCEDURE — 160036 HCHG PACU - EA ADDL 30 MINS PHASE I: Performed by: ORTHOPAEDIC SURGERY

## 2019-03-14 PROCEDURE — 700101 HCHG RX REV CODE 250

## 2019-03-14 PROCEDURE — 501838 HCHG SUTURE GENERAL: Performed by: ORTHOPAEDIC SURGERY

## 2019-03-14 PROCEDURE — A6223 GAUZE >16<=48 NO W/SAL W/O B: HCPCS | Performed by: ORTHOPAEDIC SURGERY

## 2019-03-14 PROCEDURE — C1713 ANCHOR/SCREW BN/BN,TIS/BN: HCPCS | Performed by: ORTHOPAEDIC SURGERY

## 2019-03-14 PROCEDURE — 500891 HCHG PACK, ORTHO MAJOR: Performed by: ORTHOPAEDIC SURGERY

## 2019-03-14 PROCEDURE — 160029 HCHG SURGERY MINUTES - 1ST 30 MINS LEVEL 4: Performed by: ORTHOPAEDIC SURGERY

## 2019-03-14 PROCEDURE — 502581 HCHG PACK, SHOULDER ARTHROSCOPY: Performed by: ORTHOPAEDIC SURGERY

## 2019-03-14 PROCEDURE — 82962 GLUCOSE BLOOD TEST: CPT

## 2019-03-14 PROCEDURE — A6403 STERILE GAUZE>16 <= 48 SQ IN: HCPCS | Performed by: ORTHOPAEDIC SURGERY

## 2019-03-14 PROCEDURE — 160048 HCHG OR STATISTICAL LEVEL 1-5: Performed by: ORTHOPAEDIC SURGERY

## 2019-03-14 PROCEDURE — A6222 GAUZE <=16 IN NO W/SAL W/O B: HCPCS | Performed by: ORTHOPAEDIC SURGERY

## 2019-03-14 PROCEDURE — 160041 HCHG SURGERY MINUTES - EA ADDL 1 MIN LEVEL 4: Performed by: ORTHOPAEDIC SURGERY

## 2019-03-14 PROCEDURE — 502240 HCHG MISC OR SUPPLY RC 0272: Performed by: ORTHOPAEDIC SURGERY

## 2019-03-14 PROCEDURE — 700101 HCHG RX REV CODE 250: Performed by: ANESTHESIOLOGY

## 2019-03-14 PROCEDURE — 160022 HCHG BLOCK: Performed by: ORTHOPAEDIC SURGERY

## 2019-03-14 PROCEDURE — 160009 HCHG ANES TIME/MIN: Performed by: ORTHOPAEDIC SURGERY

## 2019-03-14 PROCEDURE — 160002 HCHG RECOVERY MINUTES (STAT): Performed by: ORTHOPAEDIC SURGERY

## 2019-03-14 PROCEDURE — 500151 HCHG CANNULA, THRDED 8.4: Performed by: ORTHOPAEDIC SURGERY

## 2019-03-14 DEVICE — SUTURE ANCHOR HEALICOIL REGENESORB 5.5MM: Type: IMPLANTABLE DEVICE | Status: FUNCTIONAL

## 2019-03-14 RX ORDER — SODIUM CHLORIDE, SODIUM LACTATE, POTASSIUM CHLORIDE, CALCIUM CHLORIDE 600; 310; 30; 20 MG/100ML; MG/100ML; MG/100ML; MG/100ML
INJECTION, SOLUTION INTRAVENOUS CONTINUOUS
Status: DISCONTINUED | OUTPATIENT
Start: 2019-03-14 | End: 2019-03-15 | Stop reason: HOSPADM

## 2019-03-14 RX ORDER — OXYCODONE HCL 5 MG/5 ML
10 SOLUTION, ORAL ORAL
Status: DISCONTINUED | OUTPATIENT
Start: 2019-03-14 | End: 2019-03-15 | Stop reason: HOSPADM

## 2019-03-14 RX ORDER — SODIUM CHLORIDE, SODIUM LACTATE, POTASSIUM CHLORIDE, CALCIUM CHLORIDE 600; 310; 30; 20 MG/100ML; MG/100ML; MG/100ML; MG/100ML
1000 INJECTION, SOLUTION INTRAVENOUS
Status: DISCONTINUED | OUTPATIENT
Start: 2019-03-14 | End: 2019-03-14 | Stop reason: HOSPADM

## 2019-03-14 RX ORDER — NAPROXEN 500 MG/1
500 TABLET ORAL 2 TIMES DAILY WITH MEALS
Qty: 10 TAB | Refills: 0 | Status: SHIPPED | OUTPATIENT
Start: 2019-03-14 | End: 2019-03-19

## 2019-03-14 RX ORDER — CEFAZOLIN SODIUM 1 G/3ML
INJECTION, POWDER, FOR SOLUTION INTRAMUSCULAR; INTRAVENOUS PRN
Status: DISCONTINUED | OUTPATIENT
Start: 2019-03-14 | End: 2019-03-14 | Stop reason: SURG

## 2019-03-14 RX ORDER — HYDROMORPHONE HYDROCHLORIDE 1 MG/ML
0.2 INJECTION, SOLUTION INTRAMUSCULAR; INTRAVENOUS; SUBCUTANEOUS
Status: DISCONTINUED | OUTPATIENT
Start: 2019-03-14 | End: 2019-03-15 | Stop reason: HOSPADM

## 2019-03-14 RX ORDER — ROPIVACAINE HYDROCHLORIDE 5 MG/ML
INJECTION, SOLUTION EPIDURAL; INFILTRATION; PERINEURAL PRN
Status: DISCONTINUED | OUTPATIENT
Start: 2019-03-14 | End: 2019-03-14 | Stop reason: SURG

## 2019-03-14 RX ORDER — DIPHENHYDRAMINE HYDROCHLORIDE 50 MG/ML
12.5 INJECTION INTRAMUSCULAR; INTRAVENOUS
Status: DISCONTINUED | OUTPATIENT
Start: 2019-03-14 | End: 2019-03-15 | Stop reason: HOSPADM

## 2019-03-14 RX ORDER — OXYCODONE HCL 5 MG/5 ML
5 SOLUTION, ORAL ORAL
Status: DISCONTINUED | OUTPATIENT
Start: 2019-03-14 | End: 2019-03-15 | Stop reason: HOSPADM

## 2019-03-14 RX ORDER — ONDANSETRON 2 MG/ML
4 INJECTION INTRAMUSCULAR; INTRAVENOUS
Status: DISCONTINUED | OUTPATIENT
Start: 2019-03-14 | End: 2019-03-15 | Stop reason: HOSPADM

## 2019-03-14 RX ORDER — MEPERIDINE HYDROCHLORIDE 25 MG/ML
12.5 INJECTION INTRAMUSCULAR; INTRAVENOUS; SUBCUTANEOUS
Status: DISCONTINUED | OUTPATIENT
Start: 2019-03-14 | End: 2019-03-15 | Stop reason: HOSPADM

## 2019-03-14 RX ORDER — ONDANSETRON 4 MG/1
4 TABLET, FILM COATED ORAL EVERY 4 HOURS PRN
Qty: 20 TAB | Refills: 0 | Status: SHIPPED | OUTPATIENT
Start: 2019-03-14 | End: 2023-12-21

## 2019-03-14 RX ORDER — BUPIVACAINE HYDROCHLORIDE AND EPINEPHRINE 2.5; 5 MG/ML; UG/ML
INJECTION, SOLUTION EPIDURAL; INFILTRATION; INTRACAUDAL; PERINEURAL
Status: DISCONTINUED | OUTPATIENT
Start: 2019-03-14 | End: 2019-03-14 | Stop reason: HOSPADM

## 2019-03-14 RX ORDER — ONDANSETRON 2 MG/ML
INJECTION INTRAMUSCULAR; INTRAVENOUS PRN
Status: DISCONTINUED | OUTPATIENT
Start: 2019-03-14 | End: 2019-03-14 | Stop reason: SURG

## 2019-03-14 RX ORDER — HYDROMORPHONE HYDROCHLORIDE 1 MG/ML
0.1 INJECTION, SOLUTION INTRAMUSCULAR; INTRAVENOUS; SUBCUTANEOUS
Status: DISCONTINUED | OUTPATIENT
Start: 2019-03-14 | End: 2019-03-15 | Stop reason: HOSPADM

## 2019-03-14 RX ORDER — HYDROMORPHONE HYDROCHLORIDE 1 MG/ML
0.4 INJECTION, SOLUTION INTRAMUSCULAR; INTRAVENOUS; SUBCUTANEOUS
Status: DISCONTINUED | OUTPATIENT
Start: 2019-03-14 | End: 2019-03-15 | Stop reason: HOSPADM

## 2019-03-14 RX ORDER — OXYCODONE HYDROCHLORIDE 5 MG/1
5-10 TABLET ORAL EVERY 6 HOURS PRN
Qty: 40 TAB | Refills: 0 | Status: SHIPPED | OUTPATIENT
Start: 2019-03-14 | End: 2019-03-21

## 2019-03-14 RX ADMIN — FENTANYL CITRATE 75 MCG: 50 INJECTION, SOLUTION INTRAMUSCULAR; INTRAVENOUS at 15:20

## 2019-03-14 RX ADMIN — PROPOFOL 140 MG: 10 INJECTION, EMULSION INTRAVENOUS at 15:20

## 2019-03-14 RX ADMIN — ONDANSETRON 4 MG: 2 INJECTION INTRAMUSCULAR; INTRAVENOUS at 17:21

## 2019-03-14 RX ADMIN — CEFAZOLIN 2 G: 1 INJECTION, POWDER, FOR SOLUTION INTRAVENOUS at 15:13

## 2019-03-14 RX ADMIN — LIDOCAINE HYDROCHLORIDE 40 MG: 20 INJECTION, SOLUTION INFILTRATION; PERINEURAL at 15:20

## 2019-03-14 RX ADMIN — ROCURONIUM BROMIDE 10 MG: 10 INJECTION INTRAVENOUS at 16:42

## 2019-03-14 RX ADMIN — ROPIVACAINE HYDROCHLORIDE 20 ML: 5 INJECTION, SOLUTION EPIDURAL; INFILTRATION; PERINEURAL at 15:00

## 2019-03-14 RX ADMIN — FENTANYL CITRATE 50 MCG: 50 INJECTION, SOLUTION INTRAMUSCULAR; INTRAVENOUS at 17:27

## 2019-03-14 RX ADMIN — SODIUM CHLORIDE, SODIUM LACTATE, POTASSIUM CHLORIDE, CALCIUM CHLORIDE: 600; 310; 30; 20 INJECTION, SOLUTION INTRAVENOUS at 15:13

## 2019-03-14 RX ADMIN — FENTANYL CITRATE 50 MCG: 50 INJECTION, SOLUTION INTRAMUSCULAR; INTRAVENOUS at 17:28

## 2019-03-14 RX ADMIN — ROCURONIUM BROMIDE 40 MG: 10 INJECTION INTRAVENOUS at 15:20

## 2019-03-14 RX ADMIN — FENTANYL CITRATE 25 MCG: 50 INJECTION, SOLUTION INTRAMUSCULAR; INTRAVENOUS at 18:10

## 2019-03-14 RX ADMIN — SODIUM CHLORIDE, SODIUM LACTATE, POTASSIUM CHLORIDE, CALCIUM CHLORIDE: 600; 310; 30; 20 INJECTION, SOLUTION INTRAVENOUS at 16:21

## 2019-03-14 RX ADMIN — FENTANYL CITRATE 75 MCG: 50 INJECTION, SOLUTION INTRAMUSCULAR; INTRAVENOUS at 16:42

## 2019-03-14 ASSESSMENT — PAIN SCALES - GENERAL: PAIN_LEVEL: 5

## 2019-03-14 NOTE — ANESTHESIA PROCEDURE NOTES
Peripheral Block  Performed by: JOSH SULTANA  Authorized by: JOSH SULTANA     Start Time:  3/14/2019 3:00 PM  End Time:  3/14/2019 3:05 PM  Reason for Block: at surgeon's request and post-op pain management    patient identified, IV checked, site marked, risks and benefits discussed, surgical consent, monitors and equipment checked, pre-op evaluation and timeout performed    Patient Position:  Supine  Prep: ChloraPrep    Monitoring:  Heart rate, continuous pulse ox and cardiac monitor  Laterality:  Left  Procedures: ultrasound guided  Image captured, interpreted and electronically stored.  Local Infiltration:  Lidocaine  Strength:  1  Dose:  3  Block Type:  Single-shot  Needle Length:  100mm  Needle Gauge:  21 G  Needle Localization:  Ultrasound guidance  Injection Assessment:  Negative aspiration for heme, no paresthesia on injection, incremental injection and local visualized surrounding nerve on ultrasound  Evidence of intravascular injection: No     US Guided Interscalene Brachial Plexus Block   US transducer placed on the neck in oblique plane approximately at the level of C6.  Anterior and Middle Scalene (MSM) muscles identified with nerve trunks identified between the muscles.  Needle inserted lateral to probe and advanced under direct visualization through the MSM into a perineural position.  After negative aspiration, LA injected with ease and visualized surrounding the nerve trunks.

## 2019-03-14 NOTE — ANESTHESIA PREPROCEDURE EVALUATION
Relevant Problems   (+) CAD (coronary artery disease)   (+) CVA (cerebral vascular accident) (HCC)   (+) Chest pain   (+) DM (diabetes mellitus) (HCC)       Physical Exam    Airway   Mallampati: II  TM distance: >3 FB  Neck ROM: full       Cardiovascular - normal exam  Rhythm: regular  Rate: normal  (-) murmur     Dental - normal exam  (+) implants         Pulmonary - normal exam  Breath sounds clear to auscultation     Abdominal   (+) obese     Neurological - normal exam                 Anesthesia Plan    ASA 3   ASA physical status 3 criteria: CVA or TIA - history (> 3 months), CAD/stents (> 3 months) and diabetes - poorly controlled    Plan - general and peripheral nerve block     Peripheral nerve block will be post-op pain control  Airway plan will be ETT        Induction: intravenous    Postoperative Plan: Postoperative administration of opioids is intended.    Pertinent diagnostic labs and testing reviewed    Informed Consent:    Anesthetic plan and risks discussed with patient.    Use of blood products discussed with: patient whom consented to blood products.

## 2019-03-14 NOTE — OR NURSING
1205 Pt. Allergies and NPO status verified, home medications reconciled. Belongings secured. Pt. Verbalizes understanding of pain scale, expected course of stay and plan of care. Surgical site verified with pt. Pt. And family given home care instructions for discharge planning. IV access established. Sequentials placed on legs.

## 2019-03-14 NOTE — ANESTHESIA PROCEDURE NOTES
Airway  Date/Time: 3/14/2019 3:23 PM  Performed by: JOSH SULTANA  Authorized by: JOSH SULTANA     Location:  OR  Urgency:  Elective  Difficult Airway: No    Indications for Airway Management:  Anesthesia  Spontaneous Ventilation: absent    Sedation Level:  Deep  Preoxygenated: Yes    Patient Position:  Sniffing  Mask Difficulty Assessment:  1 - vent by mask  Final Airway Type:  Endotracheal airway  Final Endotracheal Airway:  ETT  Cuffed: Yes    Technique Used for Successful ETT Placement:  Direct laryngoscopy  Insertion Site:  Oral  Blade Type:  Donte  Laryngoscope Blade/Videolaryngoscope Blade Size:  3  ETT Size (mm):  7.5  Measured from:  Lips  ETT to Lips (cm):  25  Placement Verified by: auscultation and capnometry    Cormack-Lehane Classification:  Grade IIb - view of arytenoids or posterior of glottis only  Number of Attempts at Approach:  1

## 2019-03-15 NOTE — OP REPORT
DATE OF SERVICE:  03/14/2019    PREOPERATIVE DIAGNOSES:  1.  Left shoulder massive rotator cuff tear.  2.  Left shoulder biceps tendonitis.  3.  Left shoulder subacromial impingement with os acromiale.  4.  Acromioclavicular joint arthrosis.    POSTOPERATIVE DIAGNOSES:  1.  Left shoulder massive rotator cuff tear.  2.  Left shoulder biceps tendonitis.  3.  Left shoulder subacromial impingement with os acromiale.  4.  Acromioclavicular joint arthrosis.    PROCEDURE PERFORMED:  1.  Left shoulder diagnostic arthroscopy.  2.  Left shoulder arthroscopic massive rotator cuff repair.  3.  Left shoulder distal clavicle excision.  4.  Left shoulder subacromial decompression.  5.  Left shoulder biceps tenotomy.    ATTENDING SURGEON:  Anne Mae MD    ASSISTANT:  first miguel Weber.    ANESTHESIA:  General with an interscalene nerve block.    ANESTHESIOLOGIST:  Dr. Serrano.    SPECIMENS:  None.    ESTIMATED BLOOD LOSS:  10 mL.    FINDINGS:  There was a massive rotator cuff tear with a large amount of stump   still attached to the greater tuberosity and retraction of the tendon back to   the mid glenohumeral joint.  There was biceps tendinitis with a biceps anchor   tear as well as an os acromiale and subacromial impingement with significant   AC joint arthrosis.    COMPLICATIONS:  None.    IMPLANTS:  Tellez and Nephew Healicoil triple loaded suture anchors x 2    INDICATIONS:  The patient is a very nice 63-year-old right-hand dominant male   who fell and hit a bookshelf landing directly on his left shoulder a few weeks   ago.  The patient had significant weakness after falling as well as pain.  An   MRI revealed a large rotator cuff tear.  I had a long discussion with the   patient regarding the risks and benefits of surgery including but not limited   to bleeding, infection, risk to surrounding structures such as blood vessels   and nerves, pain, scar, reoperation, hardware failure, risk with anesthesia    such as stroke, heart attack, deep venous thrombosis, pulmonary embolism and   death.  The patient understood the risks and benefits and elected to proceed   with surgery.    PROCEDURE IN DETAIL:  The patient was met in the preoperative hold area where   the correct left upper extremity was marked with my initials.  He then   underwent an interscalene nerve block under ultrasound guidance by the   anesthesia team.  He was transferred to the operating room where he was placed   supine on the operating room table with all his bony prominences well padded.    He received 2 g of preoperative Ancef.  He was intubated by the anesthesia   team without complications.  He was then placed in the beach chair position   again with all his bony prominences well padded.  Preoperative exam under   anesthesia revealed full range of motion of the left shoulder forward flexion   to 180 degrees, abduction to 180 degrees, external rotation to 45 degrees.    Patient's left upper extremity was then prepped and draped in the usual   sterile fashion.  A preoperative timeout was held where all those in the room   agreed upon the correct patient, the correct site of surgery and the correct   surgery to be performed.    I began the procedure by injecting 30 mL of 0.25% Marcaine with epinephrine   into the glenohumeral joint via the posterior approach.  I then used an #11   blade to make my posterior portal and inserted the scope into the glenohumeral   joint.  Upon entering the glenohumeral joint, there was significant erythema   as well as very large rotator cuff tear that was immediately apparent from the   glenohumeral joint.  I then made my anterior portal under direct   visualization using a spinal needle.  A 4.0 mm sucker shaver was inserted into   the glenohumeral joint, where I performed an extensive labral debridement.  I   used the arthroscopic biter to perform my biceps tenotomy.  I again used the   4.0 mm sucker shaver to  debride the biceps tendon stump.  I used a combination   of the 4.0 mm sucker shaver and the Arthrocare wand to release the adhesions   underneath the rotator cuff just above the glenoid.  There were no loose   bodies in the axillary recess.  The patient had tearing at the biceps anchor   on the labrum as well as significant biceps tendinitis with a positive   lipstick sign when the biceps was pulled into the joint.  The cartilage of the   glenohumeral joint was intact and pristine.  I debrided the remaining stump   of the supra and infraspinatus on the greater tuberosity through the   glenohumeral joint via the anterior portal with a 4.0 mm sucker shaver.  The   scope was then inserted into the subacromial space and I established my   lateral portal under direct visualization with a spinal needle.  Again, the   4.0 mm sucker shaver was inserted into the subacromial space to debride the   stump of the rotator cuff tendon on the greater tuberosity.  I continued to   perform circumferential releases around the retracted end of the rotator cuff   in order to help mobilize the tendon.  I then performed my bursectomy with   subacromial decompression using the bur and also visualized the large os   acromiale.  I elected to keep the os acromiale in place and performed the   decompression on the os as well.  I then identified my AC joint and performed   a distal clavicle excision using the bur as well.  Once this was complete, I   then used the 4.0 mm sucker shaver to bur the bone at the greater tuberosity   to allow for bleeding after I placed my anchors.  I then placed 2 Smith and   Nephew triple loaded Healicoil suture anchors percutaneously both anteriorly   and posteriorly on the greater tuberosity.  The anchors were inserted under   direct visualization, so they did not penetrate the humeral head.  I then used   the Smith and Nephew self capture FirstPass suture passer to pass my sutures   from posterior to anterior  and these were tied in a simple fashion.  This   reduced the supra and infraspinatus back down to the greater tuberosity well   with no significant tension on the tendon.  I then copiously irrigated out the   shoulder with arthroscopic fluid and the scope was removed from the shoulder.    A 3-0 Prolene was used to suture the portals and a sterile dressing was   applied.  Patient's left upper extremity was placed in a shoulder abduction   sling and awoken from anesthesia without complications.  Please note that all   counts were correct at the end of the case and please note that Anny Carroll,  assist, was necessary for all portions of the procedure.       ____________________________________     MD LETICIA Lee / VIRGILIO    DD:  03/14/2019 17:54:43  DT:  03/14/2019 22:25:09    D#:  [id number]  Job#:  [id number]

## 2019-03-15 NOTE — ANESTHESIA QCDR
2019 [facility] Clinical Data Registry (for Quality Improvement)     Postoperative nausea/vomiting risk protocol (Adult = 18 yrs and Pediatric 3-17 yrs)- (430 and 463)  General inhalation anesthetic (NOT TIVA) with PONV risk factors: Yes  Provision of anti-emetic therapy with at least 2 different classes of agents: No   Patient DID NOT receive anti-emetic therapy and reason is documented in Medical Record: Yes       Multimodal Pain Management- (AQI59)  Patient undergoing Elective Surgery (i.e. Outpatient, or ASC, or Prescheduled Surgery prior to Hospital Admission): Yes  Use of Multimodal Pain Management, two or more drugs and/or interventions, NOT including systemic opioids: Yes   Exception: Documented allergy to multiple classes of analgesics:  N/A    PACU assessment of acute postoperative pain prior to Anesthesia Care End- Applies to Patients Age = 18- (ABG7)  Initial PACU pain score is which of the following: < 7/10  Patient unable to report pain score: N/A    Post-anesthetic transfer of care checklist/protocol to PACU/ICU- (426 and 427)  Upon conclusion of case, patient transferred to which of the following locations: PACU/Non-ICU  Use of transfer checklist/protocol: Yes  Exclusion: Service Performed in Patient Hospital Room (and thus did not require transfer): N/A    PACU Reintubation- (AQI31)  General anesthesia requiring endotracheal intubation (ETT) along with subsequent extubation in OR or PACU: Yes  Required reintubation in the PACU: No   Extubation was a planned trial documented in the medical record prior to removal of the original airway device:  N/A    Unplanned admission to ICU related to anesthesia service up through end of PACU care- (MD51)  Unplanned admission to ICU (not initially anticipated at anesthesia start time): No

## 2019-03-15 NOTE — OR NURSING
1737 To PACU from OR via gurney, side rails up x 2 for safety, lungs clear bilaterally, scds and teds on patient and machine operational, pt does not open eyes or respond verbally to RN. Breathing easy and light snoring noted on arrival, HOB elevated to 30 degrees on arrival by Dr Serrano, anesthesiologist. Immobilizer to LUE with pink/warm fingers and <3 sec cap refill.   1750 No changes  1800 Pt arouses to touch and opens eyes and responds appropriately. Denies nausea or pain. Instructed to take DB/C; able to take deep breaths only correctly. Pt unable to move L fingers post nerve block.   1815 Pain rated as 5/10 and tolerable to L shoulder. Denies nausea. Dr Serrano here; informed of occasional PVCs; no orders.   1830 Pt reports pain as tolerable at 5/10 and tolerating sips of water; monitor after titrating to 1L NC.   1838 Eyes closed and breathing slowed; pulse ox dips to 82% on 1L. Instructed to use IS x 10 every hour while awake; demonstrated understanding. IS max 3500 with goal of 3100.   1850 Pt awake and talking, drinking apple juice after tolerating sips of water. Denies nausea. Switched pulse ox sensor for improved consistent readings.   1859 Pain rated as tolerable and denies nausea. Meets criteria for transfer to stage II but remains on 1L NC so will discharge from PACU.   1900 Up with SBA from RN to dress, immobilizer adjusted for proper position for LUE and ice packs replaced to L shoulder and R shoulder on pt request. Dressing drainage unchanged from arrival; small, serosanguinous drainage.   1915 Instructed to use IS x 10; IS max 4000. Pt sitting up in recliner and using IS as instructed. Give sips of soda and crackers. Denies nausea. Pain rated as 6/10 and tolerable.   1930 Spouse brought to chair side. Pt 89-91% on 1L NC. Denies nausea.   1945 Pulse ox  88-91% so far on RA at rest. Pt remains awake without stimulation. Using IS as instructed and IS max 4000.   2000 Discharge instructions reviewed;   Carmelita packet given to spouse and her specific d/c instructions reviewed with pt/family as well. Recommended to patient/spouse to avoid marijuana tea with oxycodone and to use Naproxyn first for pain control and use oxycodone in the middle of the night if needed.   2010 Up to BR with SBA; tolerated well. Ambulated 25 feet to BR and return and tolerated well. Able to void without difficulty.   2020 Pulse ox 89-93% on RA at rest. Instructed patient to use IS x 10 x 2; using as instructed now.   2030 Instructed to just rest and RN will monitor oxygenation for desaturation and monitor for discharge.   2050 90% on RA or above at rest; with eyes closed.   2105 Meets criteria for discharge home; pulse ox 90% or greater without desaturations at rest. Pt instructed to continue IS at home as instructed. Pt plans to take Naproxyn first for breakthrough pain.

## 2019-03-15 NOTE — DISCHARGE INSTRUCTIONS
ACTIVITY: Rest and take it easy for the first 24 hours.  A responsible adult is recommended to remain with you during that time.  It is normal to feel sleepy.  We encourage you to not do anything that requires balance, judgment or coordination.    MILD FLU-LIKE SYMPTOMS ARE NORMAL. YOU MAY EXPERIENCE GENERALIZED MUSCLE ACHES, THROAT IRRITATION, HEADACHE AND/OR SOME NAUSEA.    FOR 24 HOURS DO NOT:  Drive, operate machinery or run household appliances.  Drink beer or alcoholic beverages.   Make important decisions or sign legal documents.    SPECIAL INSTRUCTIONS: Non weight bearing to Left arm; keep in immobilizer. Ice 20 minutes on/20 minutes off and elevate left shoulder.     DIET: To avoid nausea, slowly advance diet as tolerated, avoiding spicy or greasy foods for the first day.  Add more substantial food to your diet according to your physician's instructions. INCREASE FLUIDS AND FIBER TO AVOID CONSTIPATION.    SURGICAL DRESSING/BATHING: Keep dressing clean, dry and intact; follow Dr Mae instructions.     FOLLOW-UP APPOINTMENT:  A follow-up appointment should be arranged with your doctor in office as instructed; call to schedule.    You should CALL YOUR PHYSICIAN if you develop:  Fever greater than 101 degrees F.  Pain not relieved by medication, or persistent nausea or vomiting.  Excessive bleeding (blood soaking through dressing) or unexpected drainage from the wound.  Extreme redness or swelling around the incision site, drainage of pus or foul smelling drainage.  Inability to urinate or empty your bladder within 8 hours.  Problems with breathing or chest pain.    You should call 911 if you develop problems with breathing or chest pain.  If you are unable to contact your doctor or surgical center, you should go to the nearest emergency room or urgent care center.  Dr Mae's telephone #: [phone]    If any questions arise, call your doctor.  If your doctor is not available, please feel free to  call the Surgical Center at [phone].  The Center is open Monday through Friday from 7AM to 7PM.  You can also call the HEALTH HOTLINE open 24 hours/day, 7 days/week and speak to a nurse at [phone], or toll free at [phone].    A registered nurse may call you a few days after your surgery to see how you are doing after your procedure.    MEDICATIONS: Resume taking daily medication.  Take prescribed pain medication with food.  If no medication is prescribed, you may take non-aspirin pain medication if needed.  PAIN MEDICATION CAN BE VERY CONSTIPATING.  Take a stool softener or laxative such as senokot, pericolace, or milk of magnesia if needed.    Prescription given for Home.  Last pain medication given at none.    If your physician has prescribed pain medication that includes Acetaminophen (Tylenol), do not take additional Acetaminophen (Tylenol) while taking the prescribed medication.    Depression / Suicide Risk    As you are discharged from this [facility] facility, it is important to learn how to keep safe from harming yourself.    Recognize the warning signs:  · Abrupt changes in personality, positive or negative- including increase in energy   · Giving away possessions  · Change in eating patterns- significant weight changes-  positive or negative  · Change in sleeping patterns- unable to sleep or sleeping all the time   · Unwillingness or inability to communicate  · Depression  · Unusual sadness, discouragement and loneliness  · Talk of wanting to die  · Neglect of personal appearance   · Rebelliousness- reckless behavior  · Withdrawal from people/activities they love  · Confusion- inability to concentrate     If you or a loved one observes any of these behaviors or has concerns about self-harm, here's what you can do:  · Talk about it- your feelings and reasons for harming yourself  · Remove any means that you might use to hurt yourself (examples: pills, rope, extension cords,  "firearm)  · Get professional help from the community (Mental Health, Substance Abuse, psychological counseling)  · Do not be alone:Call your Safe Contact- someone whom you trust who will be there for you.  · Call your local CRISIS HOTLINE 292-2877 or 542-587-4274  · Call your local Children's Mobile Crisis Response Team [place] (087) 263-0766 or www.[employer]  · Call the toll free National Suicide Prevention Hotlines   · National Suicide Prevention Lifeline 770-375-LFMX (7202)  [employer] Line Network 800-SUICIDE (327-5564)    Peripheral Nerve Block Discharge Instructions from Same Day Surgery and Inpatient :    What to Expect - Upper Extremity  · You may experience numbness and weakness in shoulder, arm and hand  on the same side as your surgery  · This is normal. For some people, this may be an unpleasant sensation. Be very careful with your numb limb  · Ask for help when you need it  Shoulder Surgery Side Effects  · In addition to numbness and weakness you may experience other symptoms  · Other nerves that are close to those nerves injected can also be affected by local anesthesia  · You may experience a hoarseness in your voice  · Your breathing may feel different  · You may also notice drooping of your eyelid, pupil constriction, and decreased sweating, on the side of your surgery  · All of these side effects are normal and will resolve when the local anesthetic wears off   Prevent Injury  · Protect the limb like a baby  · Beware of exposing your limb to extreme heat or cold or trauma  · The limb may be injured without you noticing because it is numb  · Keep the limb elevated whenever possible  · Do not sleep on the limb  · Change the position of the limb regularly  · Avoid putting pressure on your surgical limb  Pain Control  · The initial block on the day of surgery will make your extremity feel \"numb\"  · Any consecutive injection including prior to discharge from the hospital will make your " "extremity feel \"numb\"  · You may feel an aching or burning when the local anesthesia starts to wear off  · Take pain pills as prescribed by your surgeon  · Call your surgeon or anesthesiologist if you do not have adequate pain control    Discharge Education for patients on STACIA (Obstructive Sleep Apnea) Protocol    Prior to receiving sedation or anesthesia, we screen all patients for Obstructive Sleep Apnea.  During your screening, you were identified as having suspected, but not confirmed Obstructive Sleep Apnea(STACIA).    What is Obstructive Sleep Apnea?  Sleep apnea (AP-ne-ah) is a common disorder which involves breathing pauses that occur during sleep.  These can last from 10 seconds to a minute or longer.  Normal breathing resumes often with a loud snort or choking sound.    Sleep apnea occurs in all age groups and both genders but is more common in men and people over 40 years of age.  It has been estimated that as many as 18 million Americans have sleep apnea.  Most people who have sleep apnea don’t know they have it because it only occurs during sleep.  A family member and/or bed partner may first notice the signs of sleep apnea.  Sleep apnea is a chronic (ongoing) condition that disrupts the quality and quantity of your sleep repeatedly throughout the night.  This often results in excessive daytime sleepiness or fatigue during the day.  It may also contribute to high blood pressure, heart problems, and complications following medications used for surgery and procedures.    To establish a definitive diagnosis, further testing from a specialist would be needed.  We recommend that you follow up with your primary care physician.    We recommend that you should be with an adult observer for at least 24 hours after your sedation/anesthesia.  If you have a CPAP machine, you should wear it during any sleep period (day or night) for the week following your procedure.  We encourage you to sleep on your side or in a " sitting position, even with napping.  Lying flat on your back increases the risk of apnea and airway obstruction during your post procedure recovery period.    It is important to prevent over-sedation that could increase your risk for apnea.  Please take all pain medication as directed by your physician.  If you are not getting pain relief, please contact your physician to discuss possible approaches to relieving pain while minimizing medications that can affect your breathing and oxygen levels.

## 2019-03-15 NOTE — ANESTHESIA TIME REPORT
Times      Start Time                 End Time                    Dr. Walter Noriega Name      3/14/2019 3:13 PM 3/14/2019 5:42 PM 69                                    Premium Reason  A. 3PM - 7AM     Anesthesia Start and Stop Event Times     Date Time Event    3/14/2019 1513 Anesthesia Start     1742 Anesthesia Stop        Diagnosis: Torn Left Rotator Cuff

## 2019-03-15 NOTE — OR SURGEON
Immediate Post OP Note    PreOp Diagnosis: L shoulder massive RTC tear, biceps tendonitis, subacromial impingement with os acromiale, AC arthrosis    PostOp Diagnosis: same    Procedure(s):  SHOULDER ARTHROSCOPY W/ ROTATOR CUFF REPAIR - Wound Class: Clean  SHOULDER DECOMPRESSION ARTHROSCOPIC - SUBACROMIAL - Wound Class: Clean  CLAVICLE DISTAL EXCISION - Wound Class: Clean  SHOULDER ARTHROSCOPY W/ BICEPS TENOTOMY, JOSEPH    Surgeon(s):  Anne Mae M.D.    Anesthesiologist/Type of Anesthesia:  Anesthesiologist: Vitaliy Serrano M.D.  Anesthesia Technician: Quinton Corcoran/General    Surgical Staff:  Circulator: Caroline Reyes R.N.  Scrub Person: Ananda Varela    Specimens removed if any:  None    Estimated Blood Loss: 10 mL    Findings: massive RTC tear, biceps anchor tear, os acromiale with subacromial impingement, AC joint arthrosis    Complications: none        3/14/2019 5:26 PM Anne Mae M.D.

## 2019-03-15 NOTE — ANESTHESIA POSTPROCEDURE EVALUATION
Patient: Hossein Anderson .    Procedure Summary     Date:  03/14/19 Room / Location:  [place] [address] / SURGERY [facility]    Anesthesia Start:  1513 Anesthesia Stop:  1742    Procedures:       SHOULDER ARTHROSCOPY W/ ROTATOR CUFF REPAIR (Left Shoulder)      SHOULDER DECOMPRESSION ARTHROSCOPIC - SUBACROMIAL (Left Shoulder)      CLAVICLE DISTAL EXCISION (Left Shoulder)      SHOULDER ARTHROSCOPY W/ BICIPITAL TENODESIS REPAIR - TENOTOMY, JOSEPH Diagnosis:  (rotator cuff tear-left)    Surgeon:  Anne Mae M.D. Responsible Provider:  Vitaliy Serrano M.D.    Anesthesia Type:  general, peripheral nerve block ASA Status:  3          Final Anesthesia Type: general, peripheral nerve block  Last vitals  BP   Blood Pressure: 103/72 (03/14/19 1750)    Temp   36.6 °C (97.9 °F) (03/14/19 1737)    Pulse   Heart Rate (Monitored): 89 (03/14/19 1805)   Resp   16 (03/14/19 1805)    SpO2   96 % (03/14/19 1805)      Anesthesia Post Evaluation    Patient location during evaluation: PACU  Patient participation: complete - patient participated  Level of consciousness: awake and alert  Pain score: 5    Airway patency: patent  Anesthetic complications: no  Cardiovascular status: hemodynamically stable  Respiratory status: acceptable  Hydration status: euvolemic    PONV: none

## 2019-08-01 ENCOUNTER — HOSPITAL ENCOUNTER (OUTPATIENT)
Dept: LAB | Facility: MEDICAL CENTER | Age: 64
End: 2019-08-01
Attending: FAMILY MEDICINE
Payer: COMMERCIAL

## 2019-08-01 LAB
ALBUMIN SERPL BCP-MCNC: 4.4 G/DL (ref 3.2–4.9)
ALBUMIN/GLOB SERPL: 1.3 G/DL
ALP SERPL-CCNC: 48 U/L (ref 30–99)
ALT SERPL-CCNC: 48 U/L (ref 2–50)
ANION GAP SERPL CALC-SCNC: 8 MMOL/L (ref 0–11.9)
AST SERPL-CCNC: 23 U/L (ref 12–45)
BASOPHILS # BLD AUTO: 1.2 % (ref 0–1.8)
BASOPHILS # BLD: 0.09 K/UL (ref 0–0.12)
BILIRUB SERPL-MCNC: 0.9 MG/DL (ref 0.1–1.5)
BUN SERPL-MCNC: 20 MG/DL (ref 8–22)
CALCIUM SERPL-MCNC: 9 MG/DL (ref 8.5–10.5)
CHLORIDE SERPL-SCNC: 103 MMOL/L (ref 96–112)
CHOLEST SERPL-MCNC: 264 MG/DL (ref 100–199)
CO2 SERPL-SCNC: 24 MMOL/L (ref 20–33)
CREAT SERPL-MCNC: 0.72 MG/DL (ref 0.5–1.4)
CREAT UR-MCNC: 261.7 MG/DL
EOSINOPHIL # BLD AUTO: 0.2 K/UL (ref 0–0.51)
EOSINOPHIL NFR BLD: 2.6 % (ref 0–6.9)
ERYTHROCYTE [DISTWIDTH] IN BLOOD BY AUTOMATED COUNT: 48.2 FL (ref 35.9–50)
EST. AVERAGE GLUCOSE BLD GHB EST-MCNC: 249 MG/DL
FASTING STATUS PATIENT QL REPORTED: NORMAL
GLOBULIN SER CALC-MCNC: 3.3 G/DL (ref 1.9–3.5)
GLUCOSE SERPL-MCNC: 192 MG/DL (ref 65–99)
HBA1C MFR BLD: 10.3 % (ref 0–5.6)
HCT VFR BLD AUTO: 46.1 % (ref 42–52)
HDLC SERPL-MCNC: 39 MG/DL
HGB BLD-MCNC: 15.3 G/DL (ref 14–18)
IMM GRANULOCYTES # BLD AUTO: 0.06 K/UL (ref 0–0.11)
IMM GRANULOCYTES NFR BLD AUTO: 0.8 % (ref 0–0.9)
LDLC SERPL CALC-MCNC: 181 MG/DL
LYMPHOCYTES # BLD AUTO: 3.28 K/UL (ref 1–4.8)
LYMPHOCYTES NFR BLD: 43 % (ref 22–41)
MCH RBC QN AUTO: 29.7 PG (ref 27–33)
MCHC RBC AUTO-ENTMCNC: 33.2 G/DL (ref 33.7–35.3)
MCV RBC AUTO: 89.3 FL (ref 81.4–97.8)
MICROALBUMIN UR-MCNC: 1 MG/DL
MICROALBUMIN/CREAT UR: 4 MG/G (ref 0–30)
MONOCYTES # BLD AUTO: 0.69 K/UL (ref 0–0.85)
MONOCYTES NFR BLD AUTO: 9 % (ref 0–13.4)
NEUTROPHILS # BLD AUTO: 3.31 K/UL (ref 1.82–7.42)
NEUTROPHILS NFR BLD: 43.4 % (ref 44–72)
NRBC # BLD AUTO: 0 K/UL
NRBC BLD-RTO: 0 /100 WBC
PLATELET # BLD AUTO: 238 K/UL (ref 164–446)
PMV BLD AUTO: 10.3 FL (ref 9–12.9)
POTASSIUM SERPL-SCNC: 4.3 MMOL/L (ref 3.6–5.5)
PROT SERPL-MCNC: 7.7 G/DL (ref 6–8.2)
RBC # BLD AUTO: 5.16 M/UL (ref 4.7–6.1)
SODIUM SERPL-SCNC: 135 MMOL/L (ref 135–145)
TRIGL SERPL-MCNC: 219 MG/DL (ref 0–149)
WBC # BLD AUTO: 7.6 K/UL (ref 4.8–10.8)

## 2019-08-01 PROCEDURE — 85025 COMPLETE CBC W/AUTO DIFF WBC: CPT

## 2019-08-01 PROCEDURE — 36415 COLL VENOUS BLD VENIPUNCTURE: CPT

## 2019-08-01 PROCEDURE — 80061 LIPID PANEL: CPT

## 2019-08-01 PROCEDURE — 80053 COMPREHEN METABOLIC PANEL: CPT

## 2019-08-01 PROCEDURE — 83036 HEMOGLOBIN GLYCOSYLATED A1C: CPT

## 2019-08-01 PROCEDURE — 82570 ASSAY OF URINE CREATININE: CPT

## 2019-08-01 PROCEDURE — 82043 UR ALBUMIN QUANTITATIVE: CPT

## 2021-04-10 ENCOUNTER — HOSPITAL ENCOUNTER (OUTPATIENT)
Dept: LAB | Facility: MEDICAL CENTER | Age: 66
End: 2021-04-10
Attending: FAMILY MEDICINE
Payer: COMMERCIAL

## 2021-04-10 LAB
ALBUMIN SERPL BCP-MCNC: 4.5 G/DL (ref 3.2–4.9)
ALBUMIN/GLOB SERPL: 1.6 G/DL
ALP SERPL-CCNC: 53 U/L (ref 30–99)
ALT SERPL-CCNC: 44 U/L (ref 2–50)
ANION GAP SERPL CALC-SCNC: 12 MMOL/L (ref 7–16)
AST SERPL-CCNC: 23 U/L (ref 12–45)
BASOPHILS # BLD AUTO: 0.9 % (ref 0–1.8)
BASOPHILS # BLD: 0.08 K/UL (ref 0–0.12)
BILIRUB SERPL-MCNC: 1.1 MG/DL (ref 0.1–1.5)
BUN SERPL-MCNC: 20 MG/DL (ref 8–22)
CALCIUM SERPL-MCNC: 9.2 MG/DL (ref 8.5–10.5)
CHLORIDE SERPL-SCNC: 102 MMOL/L (ref 96–112)
CHOLEST SERPL-MCNC: 249 MG/DL (ref 100–199)
CO2 SERPL-SCNC: 23 MMOL/L (ref 20–33)
CREAT SERPL-MCNC: 0.58 MG/DL (ref 0.5–1.4)
CREAT UR-MCNC: 90.3 MG/DL
EOSINOPHIL # BLD AUTO: 0.09 K/UL (ref 0–0.51)
EOSINOPHIL NFR BLD: 1.1 % (ref 0–6.9)
ERYTHROCYTE [DISTWIDTH] IN BLOOD BY AUTOMATED COUNT: 48.1 FL (ref 35.9–50)
EST. AVERAGE GLUCOSE BLD GHB EST-MCNC: 349 MG/DL
FASTING STATUS PATIENT QL REPORTED: NORMAL
GLOBULIN SER CALC-MCNC: 2.9 G/DL (ref 1.9–3.5)
GLUCOSE SERPL-MCNC: 281 MG/DL (ref 65–99)
HBA1C MFR BLD: 13.8 % (ref 4–5.6)
HCT VFR BLD AUTO: 48 % (ref 42–52)
HDLC SERPL-MCNC: 49 MG/DL
HGB BLD-MCNC: 16 G/DL (ref 14–18)
IMM GRANULOCYTES # BLD AUTO: 0.05 K/UL (ref 0–0.11)
IMM GRANULOCYTES NFR BLD AUTO: 0.6 % (ref 0–0.9)
LDLC SERPL CALC-MCNC: 172 MG/DL
LYMPHOCYTES # BLD AUTO: 3.08 K/UL (ref 1–4.8)
LYMPHOCYTES NFR BLD: 36.5 % (ref 22–41)
MCH RBC QN AUTO: 30.3 PG (ref 27–33)
MCHC RBC AUTO-ENTMCNC: 33.3 G/DL (ref 33.7–35.3)
MCV RBC AUTO: 90.9 FL (ref 81.4–97.8)
MICROALBUMIN UR-MCNC: <1.2 MG/DL
MICROALBUMIN/CREAT UR: NORMAL MG/G (ref 0–30)
MONOCYTES # BLD AUTO: 0.62 K/UL (ref 0–0.85)
MONOCYTES NFR BLD AUTO: 7.3 % (ref 0–13.4)
NEUTROPHILS # BLD AUTO: 4.52 K/UL (ref 1.82–7.42)
NEUTROPHILS NFR BLD: 53.6 % (ref 44–72)
NRBC # BLD AUTO: 0 K/UL
NRBC BLD-RTO: 0 /100 WBC
PLATELET # BLD AUTO: 256 K/UL (ref 164–446)
PMV BLD AUTO: 10.6 FL (ref 9–12.9)
POTASSIUM SERPL-SCNC: 4.6 MMOL/L (ref 3.6–5.5)
PROT SERPL-MCNC: 7.4 G/DL (ref 6–8.2)
PSA SERPL-MCNC: 0.18 NG/ML (ref 0–4)
RBC # BLD AUTO: 5.28 M/UL (ref 4.7–6.1)
SODIUM SERPL-SCNC: 137 MMOL/L (ref 135–145)
TRIGL SERPL-MCNC: 138 MG/DL (ref 0–149)
WBC # BLD AUTO: 8.4 K/UL (ref 4.8–10.8)

## 2021-04-10 PROCEDURE — 82570 ASSAY OF URINE CREATININE: CPT

## 2021-04-10 PROCEDURE — 80053 COMPREHEN METABOLIC PANEL: CPT

## 2021-04-10 PROCEDURE — 84153 ASSAY OF PSA TOTAL: CPT

## 2021-04-10 PROCEDURE — 80061 LIPID PANEL: CPT

## 2021-04-10 PROCEDURE — 82043 UR ALBUMIN QUANTITATIVE: CPT

## 2021-04-10 PROCEDURE — 85025 COMPLETE CBC W/AUTO DIFF WBC: CPT

## 2021-04-10 PROCEDURE — 83036 HEMOGLOBIN GLYCOSYLATED A1C: CPT

## 2021-04-10 PROCEDURE — 36415 COLL VENOUS BLD VENIPUNCTURE: CPT

## 2021-08-07 ENCOUNTER — HOSPITAL ENCOUNTER (OUTPATIENT)
Dept: LAB | Facility: MEDICAL CENTER | Age: 66
End: 2021-08-07
Attending: FAMILY MEDICINE
Payer: COMMERCIAL

## 2021-08-07 LAB
ALBUMIN SERPL BCP-MCNC: 4.4 G/DL (ref 3.2–4.9)
ALBUMIN/GLOB SERPL: 1.5 G/DL
ALP SERPL-CCNC: 51 U/L (ref 30–99)
ALT SERPL-CCNC: 30 U/L (ref 2–50)
ANION GAP SERPL CALC-SCNC: 14 MMOL/L (ref 7–16)
AST SERPL-CCNC: 17 U/L (ref 12–45)
BASOPHILS # BLD AUTO: 0.9 % (ref 0–1.8)
BASOPHILS # BLD: 0.08 K/UL (ref 0–0.12)
BILIRUB SERPL-MCNC: 1 MG/DL (ref 0.1–1.5)
BUN SERPL-MCNC: 20 MG/DL (ref 8–22)
CALCIUM SERPL-MCNC: 9.1 MG/DL (ref 8.5–10.5)
CHLORIDE SERPL-SCNC: 102 MMOL/L (ref 96–112)
CHOLEST SERPL-MCNC: 138 MG/DL (ref 100–199)
CO2 SERPL-SCNC: 21 MMOL/L (ref 20–33)
CREAT SERPL-MCNC: 0.53 MG/DL (ref 0.5–1.4)
CREAT UR-MCNC: 125.06 MG/DL
EOSINOPHIL # BLD AUTO: 0.19 K/UL (ref 0–0.51)
EOSINOPHIL NFR BLD: 2.3 % (ref 0–6.9)
ERYTHROCYTE [DISTWIDTH] IN BLOOD BY AUTOMATED COUNT: 45.1 FL (ref 35.9–50)
EST. AVERAGE GLUCOSE BLD GHB EST-MCNC: 260 MG/DL
FASTING STATUS PATIENT QL REPORTED: NORMAL
GLOBULIN SER CALC-MCNC: 2.9 G/DL (ref 1.9–3.5)
GLUCOSE SERPL-MCNC: 268 MG/DL (ref 65–99)
HBA1C MFR BLD: 10.7 % (ref 4–5.6)
HCT VFR BLD AUTO: 45.2 % (ref 42–52)
HDLC SERPL-MCNC: 46 MG/DL
HGB BLD-MCNC: 15.2 G/DL (ref 14–18)
IMM GRANULOCYTES # BLD AUTO: 0.06 K/UL (ref 0–0.11)
IMM GRANULOCYTES NFR BLD AUTO: 0.7 % (ref 0–0.9)
LDLC SERPL CALC-MCNC: 78 MG/DL
LYMPHOCYTES # BLD AUTO: 3.05 K/UL (ref 1–4.8)
LYMPHOCYTES NFR BLD: 36.2 % (ref 22–41)
MCH RBC QN AUTO: 30.5 PG (ref 27–33)
MCHC RBC AUTO-ENTMCNC: 33.6 G/DL (ref 33.7–35.3)
MCV RBC AUTO: 90.6 FL (ref 81.4–97.8)
MICROALBUMIN UR-MCNC: <1.2 MG/DL
MICROALBUMIN/CREAT UR: NORMAL MG/G (ref 0–30)
MONOCYTES # BLD AUTO: 0.69 K/UL (ref 0–0.85)
MONOCYTES NFR BLD AUTO: 8.2 % (ref 0–13.4)
NEUTROPHILS # BLD AUTO: 4.36 K/UL (ref 1.82–7.42)
NEUTROPHILS NFR BLD: 51.7 % (ref 44–72)
NRBC # BLD AUTO: 0 K/UL
NRBC BLD-RTO: 0 /100 WBC
PLATELET # BLD AUTO: 225 K/UL (ref 164–446)
PMV BLD AUTO: 10.8 FL (ref 9–12.9)
POTASSIUM SERPL-SCNC: 4.5 MMOL/L (ref 3.6–5.5)
PROT SERPL-MCNC: 7.3 G/DL (ref 6–8.2)
RBC # BLD AUTO: 4.99 M/UL (ref 4.7–6.1)
SODIUM SERPL-SCNC: 137 MMOL/L (ref 135–145)
TRIGL SERPL-MCNC: 71 MG/DL (ref 0–149)
WBC # BLD AUTO: 8.4 K/UL (ref 4.8–10.8)

## 2021-08-07 PROCEDURE — 80053 COMPREHEN METABOLIC PANEL: CPT

## 2021-08-07 PROCEDURE — 82043 UR ALBUMIN QUANTITATIVE: CPT

## 2021-08-07 PROCEDURE — 83036 HEMOGLOBIN GLYCOSYLATED A1C: CPT

## 2021-08-07 PROCEDURE — 36415 COLL VENOUS BLD VENIPUNCTURE: CPT

## 2021-08-07 PROCEDURE — 85025 COMPLETE CBC W/AUTO DIFF WBC: CPT

## 2021-08-07 PROCEDURE — 82570 ASSAY OF URINE CREATININE: CPT

## 2021-08-07 PROCEDURE — 80061 LIPID PANEL: CPT

## 2021-12-14 ENCOUNTER — HOSPITAL ENCOUNTER (OUTPATIENT)
Dept: LAB | Facility: MEDICAL CENTER | Age: 66
End: 2021-12-14
Attending: FAMILY MEDICINE
Payer: COMMERCIAL

## 2021-12-14 LAB
ALBUMIN SERPL BCP-MCNC: 4.5 G/DL (ref 3.2–4.9)
ALBUMIN/GLOB SERPL: 1.7 G/DL
ALP SERPL-CCNC: 45 U/L (ref 30–99)
ALT SERPL-CCNC: 28 U/L (ref 2–50)
ANION GAP SERPL CALC-SCNC: 11 MMOL/L (ref 7–16)
AST SERPL-CCNC: 19 U/L (ref 12–45)
BASOPHILS # BLD AUTO: 1.1 % (ref 0–1.8)
BASOPHILS # BLD: 0.1 K/UL (ref 0–0.12)
BILIRUB SERPL-MCNC: 1 MG/DL (ref 0.1–1.5)
BUN SERPL-MCNC: 20 MG/DL (ref 8–22)
CALCIUM SERPL-MCNC: 9 MG/DL (ref 8.5–10.5)
CHLORIDE SERPL-SCNC: 100 MMOL/L (ref 96–112)
CHOLEST SERPL-MCNC: 148 MG/DL (ref 100–199)
CO2 SERPL-SCNC: 24 MMOL/L (ref 20–33)
CREAT SERPL-MCNC: 0.7 MG/DL (ref 0.5–1.4)
CREAT UR-MCNC: 140.45 MG/DL
EOSINOPHIL # BLD AUTO: 0.26 K/UL (ref 0–0.51)
EOSINOPHIL NFR BLD: 2.9 % (ref 0–6.9)
ERYTHROCYTE [DISTWIDTH] IN BLOOD BY AUTOMATED COUNT: 46.8 FL (ref 35.9–50)
EST. AVERAGE GLUCOSE BLD GHB EST-MCNC: 235 MG/DL
FASTING STATUS PATIENT QL REPORTED: NORMAL
GLOBULIN SER CALC-MCNC: 2.7 G/DL (ref 1.9–3.5)
GLUCOSE SERPL-MCNC: 249 MG/DL (ref 65–99)
HBA1C MFR BLD: 9.8 % (ref 4–5.6)
HCT VFR BLD AUTO: 46.8 % (ref 42–52)
HDLC SERPL-MCNC: 44 MG/DL
HGB BLD-MCNC: 15.3 G/DL (ref 14–18)
IMM GRANULOCYTES # BLD AUTO: 0.06 K/UL (ref 0–0.11)
IMM GRANULOCYTES NFR BLD AUTO: 0.7 % (ref 0–0.9)
LDLC SERPL CALC-MCNC: 85 MG/DL
LYMPHOCYTES # BLD AUTO: 3.47 K/UL (ref 1–4.8)
LYMPHOCYTES NFR BLD: 38.8 % (ref 22–41)
MCH RBC QN AUTO: 29.8 PG (ref 27–33)
MCHC RBC AUTO-ENTMCNC: 32.7 G/DL (ref 33.7–35.3)
MCV RBC AUTO: 91.1 FL (ref 81.4–97.8)
MICROALBUMIN UR-MCNC: <1.2 MG/DL
MICROALBUMIN/CREAT UR: NORMAL MG/G (ref 0–30)
MONOCYTES # BLD AUTO: 0.9 K/UL (ref 0–0.85)
MONOCYTES NFR BLD AUTO: 10.1 % (ref 0–13.4)
NEUTROPHILS # BLD AUTO: 4.16 K/UL (ref 1.82–7.42)
NEUTROPHILS NFR BLD: 46.4 % (ref 44–72)
NRBC # BLD AUTO: 0 K/UL
NRBC BLD-RTO: 0 /100 WBC
PLATELET # BLD AUTO: 213 K/UL (ref 164–446)
PMV BLD AUTO: 10.6 FL (ref 9–12.9)
POTASSIUM SERPL-SCNC: 4.7 MMOL/L (ref 3.6–5.5)
PROT SERPL-MCNC: 7.2 G/DL (ref 6–8.2)
RBC # BLD AUTO: 5.14 M/UL (ref 4.7–6.1)
SODIUM SERPL-SCNC: 135 MMOL/L (ref 135–145)
TRIGL SERPL-MCNC: 93 MG/DL (ref 0–149)
WBC # BLD AUTO: 9 K/UL (ref 4.8–10.8)

## 2021-12-14 PROCEDURE — 36415 COLL VENOUS BLD VENIPUNCTURE: CPT

## 2021-12-14 PROCEDURE — 82043 UR ALBUMIN QUANTITATIVE: CPT

## 2021-12-14 PROCEDURE — 85025 COMPLETE CBC W/AUTO DIFF WBC: CPT

## 2021-12-14 PROCEDURE — 83036 HEMOGLOBIN GLYCOSYLATED A1C: CPT

## 2021-12-14 PROCEDURE — 80053 COMPREHEN METABOLIC PANEL: CPT

## 2021-12-14 PROCEDURE — 80061 LIPID PANEL: CPT

## 2021-12-14 PROCEDURE — 82570 ASSAY OF URINE CREATININE: CPT

## 2022-05-17 ENCOUNTER — HOSPITAL ENCOUNTER (OUTPATIENT)
Dept: LAB | Facility: MEDICAL CENTER | Age: 67
End: 2022-05-17
Attending: FAMILY MEDICINE
Payer: COMMERCIAL

## 2022-05-17 LAB
ALBUMIN SERPL BCP-MCNC: 4.6 G/DL (ref 3.2–4.9)
ALBUMIN/GLOB SERPL: 1.9 G/DL
ALP SERPL-CCNC: 50 U/L (ref 30–99)
ALT SERPL-CCNC: 26 U/L (ref 2–50)
ANION GAP SERPL CALC-SCNC: 12 MMOL/L (ref 7–16)
AST SERPL-CCNC: 13 U/L (ref 12–45)
BASOPHILS # BLD AUTO: 0.9 % (ref 0–1.8)
BASOPHILS # BLD: 0.07 K/UL (ref 0–0.12)
BILIRUB SERPL-MCNC: 0.8 MG/DL (ref 0.1–1.5)
BUN SERPL-MCNC: 19 MG/DL (ref 8–22)
CALCIUM SERPL-MCNC: 9.2 MG/DL (ref 8.5–10.5)
CHLORIDE SERPL-SCNC: 102 MMOL/L (ref 96–112)
CHOLEST SERPL-MCNC: 148 MG/DL (ref 100–199)
CO2 SERPL-SCNC: 21 MMOL/L (ref 20–33)
CREAT SERPL-MCNC: 0.63 MG/DL (ref 0.5–1.4)
CREAT UR-MCNC: 34.76 MG/DL
EOSINOPHIL # BLD AUTO: 0.19 K/UL (ref 0–0.51)
EOSINOPHIL NFR BLD: 2.5 % (ref 0–6.9)
ERYTHROCYTE [DISTWIDTH] IN BLOOD BY AUTOMATED COUNT: 46.8 FL (ref 35.9–50)
EST. AVERAGE GLUCOSE BLD GHB EST-MCNC: 194 MG/DL
GFR SERPLBLD CREATININE-BSD FMLA CKD-EPI: 106 ML/MIN/1.73 M 2
GLOBULIN SER CALC-MCNC: 2.4 G/DL (ref 1.9–3.5)
GLUCOSE SERPL-MCNC: 174 MG/DL (ref 65–99)
HBA1C MFR BLD: 8.4 % (ref 4–5.6)
HCT VFR BLD AUTO: 44.2 % (ref 42–52)
HDLC SERPL-MCNC: 45 MG/DL
HGB BLD-MCNC: 14.9 G/DL (ref 14–18)
IMM GRANULOCYTES # BLD AUTO: 0.04 K/UL (ref 0–0.11)
IMM GRANULOCYTES NFR BLD AUTO: 0.5 % (ref 0–0.9)
LDLC SERPL CALC-MCNC: 86 MG/DL
LYMPHOCYTES # BLD AUTO: 2.77 K/UL (ref 1–4.8)
LYMPHOCYTES NFR BLD: 36.9 % (ref 22–41)
MCH RBC QN AUTO: 30.5 PG (ref 27–33)
MCHC RBC AUTO-ENTMCNC: 33.7 G/DL (ref 33.7–35.3)
MCV RBC AUTO: 90.6 FL (ref 81.4–97.8)
MICROALBUMIN UR-MCNC: <1.2 MG/DL
MICROALBUMIN/CREAT UR: NORMAL MG/G (ref 0–30)
MONOCYTES # BLD AUTO: 0.54 K/UL (ref 0–0.85)
MONOCYTES NFR BLD AUTO: 7.2 % (ref 0–13.4)
NEUTROPHILS # BLD AUTO: 3.9 K/UL (ref 1.82–7.42)
NEUTROPHILS NFR BLD: 52 % (ref 44–72)
NRBC # BLD AUTO: 0 K/UL
NRBC BLD-RTO: 0 /100 WBC
PLATELET # BLD AUTO: 238 K/UL (ref 164–446)
PMV BLD AUTO: 10.2 FL (ref 9–12.9)
POTASSIUM SERPL-SCNC: 4.3 MMOL/L (ref 3.6–5.5)
PROT SERPL-MCNC: 7 G/DL (ref 6–8.2)
RBC # BLD AUTO: 4.88 M/UL (ref 4.7–6.1)
SODIUM SERPL-SCNC: 135 MMOL/L (ref 135–145)
TRIGL SERPL-MCNC: 86 MG/DL (ref 0–149)
WBC # BLD AUTO: 7.5 K/UL (ref 4.8–10.8)

## 2022-05-17 PROCEDURE — 83036 HEMOGLOBIN GLYCOSYLATED A1C: CPT

## 2022-05-17 PROCEDURE — 80053 COMPREHEN METABOLIC PANEL: CPT

## 2022-05-17 PROCEDURE — 82043 UR ALBUMIN QUANTITATIVE: CPT

## 2022-05-17 PROCEDURE — 80061 LIPID PANEL: CPT

## 2022-05-17 PROCEDURE — 82570 ASSAY OF URINE CREATININE: CPT

## 2022-05-17 PROCEDURE — 85025 COMPLETE CBC W/AUTO DIFF WBC: CPT

## 2022-05-17 PROCEDURE — 36415 COLL VENOUS BLD VENIPUNCTURE: CPT

## 2022-07-04 ENCOUNTER — HOSPITAL ENCOUNTER (EMERGENCY)
Facility: MEDICAL CENTER | Age: 67
End: 2022-07-04
Attending: EMERGENCY MEDICINE
Payer: MEDICARE

## 2022-07-04 ENCOUNTER — APPOINTMENT (OUTPATIENT)
Dept: RADIOLOGY | Facility: MEDICAL CENTER | Age: 67
End: 2022-07-04
Attending: EMERGENCY MEDICINE
Payer: MEDICARE

## 2022-07-04 VITALS
WEIGHT: 203 LBS | BODY MASS INDEX: 28.42 KG/M2 | SYSTOLIC BLOOD PRESSURE: 148 MMHG | DIASTOLIC BLOOD PRESSURE: 99 MMHG | HEIGHT: 71 IN | HEART RATE: 65 BPM | TEMPERATURE: 98.3 F | RESPIRATION RATE: 18 BRPM | OXYGEN SATURATION: 99 %
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
## 2022-07-04 DIAGNOSIS — M76.31 IT BAND SYNDROME, RIGHT: ICD-10-CM

## 2022-07-04 DIAGNOSIS — S76.011A STRAIN OF RIGHT HIP, INITIAL ENCOUNTER: ICD-10-CM

## 2022-07-04 PROCEDURE — 96372 THER/PROPH/DIAG INJ SC/IM: CPT

## 2022-07-04 PROCEDURE — 700111 HCHG RX REV CODE 636 W/ 250 OVERRIDE (IP): Performed by: EMERGENCY MEDICINE

## 2022-07-04 PROCEDURE — 73552 X-RAY EXAM OF FEMUR 2/>: CPT | Mod: RT

## 2022-07-04 PROCEDURE — 99284 EMERGENCY DEPT VISIT MOD MDM: CPT

## 2022-07-04 PROCEDURE — A9270 NON-COVERED ITEM OR SERVICE: HCPCS | Performed by: EMERGENCY MEDICINE

## 2022-07-04 PROCEDURE — 700102 HCHG RX REV CODE 250 W/ 637 OVERRIDE(OP): Performed by: EMERGENCY MEDICINE

## 2022-07-04 RX ORDER — OXYCODONE HYDROCHLORIDE AND ACETAMINOPHEN 5; 325 MG/1; MG/1
2 TABLET ORAL ONCE
Status: COMPLETED | OUTPATIENT
Start: 2022-07-04 | End: 2022-07-04

## 2022-07-04 RX ORDER — KETOROLAC TROMETHAMINE 30 MG/ML
15 INJECTION, SOLUTION INTRAMUSCULAR; INTRAVENOUS ONCE
Status: COMPLETED | OUTPATIENT
Start: 2022-07-04 | End: 2022-07-04

## 2022-07-04 RX ORDER — CYCLOBENZAPRINE HCL 5 MG
5-10 TABLET ORAL 3 TIMES DAILY PRN
Qty: 30 TABLET | Refills: 0 | Status: SHIPPED | OUTPATIENT
Start: 2022-07-04 | End: 2022-07-14

## 2022-07-04 RX ORDER — HYDROMORPHONE HYDROCHLORIDE 1 MG/ML
1 INJECTION, SOLUTION INTRAMUSCULAR; INTRAVENOUS; SUBCUTANEOUS ONCE
Status: COMPLETED | OUTPATIENT
Start: 2022-07-04 | End: 2022-07-04

## 2022-07-04 RX ORDER — OXYCODONE HYDROCHLORIDE 5 MG/1
5 TABLET ORAL EVERY 4 HOURS PRN
Qty: 18 TABLET | Refills: 0 | Status: SHIPPED | OUTPATIENT
Start: 2022-07-04 | End: 2022-07-07

## 2022-07-04 RX ADMIN — HYDROMORPHONE HYDROCHLORIDE 1 MG: 1 INJECTION, SOLUTION INTRAMUSCULAR; INTRAVENOUS; SUBCUTANEOUS at 13:30

## 2022-07-04 RX ADMIN — KETOROLAC TROMETHAMINE 15 MG: 30 INJECTION, SOLUTION INTRAMUSCULAR; INTRAVENOUS at 13:31

## 2022-07-04 RX ADMIN — OXYCODONE AND ACETAMINOPHEN 2 TABLET: 5; 325 TABLET ORAL at 14:27

## 2022-07-04 ASSESSMENT — FIBROSIS 4 INDEX: FIB4 SCORE: 0.7

## 2022-07-04 NOTE — ED TRIAGE NOTES
Pt to triage in wheelchair  Chief Complaint   Patient presents with   • Hip Pain     R hip pain, shooting all the way down legx 3 days, denies recent injuries/trauma/falls   Pt took hydrocodone 5/325 this am with no relief  Pt A & 0 x 4, speech clear  Pt updated on triage process and asked to inform RN of any changes while waiting in lobby.

## 2022-07-04 NOTE — ED NOTES
Patient is stable for discharge at this time, anticipatory guidance provided, close follow-up is encouraged, and ED return instructions have been detailed. Patient and spouse are both agreeable to the disposition and plan and discharged home in ambulatory state and in good condition.     Rx education provided, Pt verbalized understanding;    Narcotic paper signed;    Pt wife driving him home;

## 2022-07-04 NOTE — ED PROVIDER NOTES
ED Provider Note    ED Provider Note    Primary care provider: Branden ROJAS M.D.  Means of arrival: Private vehicle  History obtained from: Patient  History limited by: None    CHIEF COMPLAINT  Chief Complaint   Patient presents with   • Hip Pain     R hip pain, shooting all the way down legx 3 days, denies recent injuries/trauma/falls       HPI  Hossein Anderson Sr. is a 67 y.o. male who presents to the Emergency Department with chief complaint of right thigh pain after jumping into a ditch.  Pt describes pain down lateral aspect of right thigh.  Pain first began 3 days ago.  And has been gradually worsening since onset.  Patient describes it as achy and worse with motion.  Pt describes pain as starting after jumping into ditch.   Pt denies hx of prior surgeries.    REVIEW OF SYSTEMS  Pertinent negatives include no weakness, numbness, fever.      PAST MEDICAL HISTORY   has a past medical history of Arthritis, Asthma, CAD (coronary artery disease), Cancer ([facility]) (1988), Cataract, Diabetes ([facility]), Myocardial infarct ([facility]) (1999,2000), and Stroke ([facility]) (1990).    SURGICAL HISTORY   has a past surgical history that includes cataract extraction with iol (Bilateral, 2005); cystoscopy; shoulder arthrotomy (Right); shldr arthroscop,surg,w/rotat cuff repr (Left, 3/14/2019); shldr arthroscop,part acromioplas (Left, 3/14/2019); arthroscopy shoulder surgical biceps tenodes* (Left, 3/14/2019); and clavicle distal excision (Left, 3/14/2019).    SOCIAL HISTORY  Social History     Tobacco Use   • Smoking status: Former Smoker     Types: Cigars   • Smokeless tobacco: Never Used   Substance Use Topics   • Alcohol use: Yes     Alcohol/week: 1.8 - 2.4 oz     Types: 3 - 4 Standard drinks or equivalent per week     Comment: rare   • Drug use: Yes     Types: Marijuana     Comment: medical has not used it in months      Social History     Substance and Sexual Activity   Drug Use Yes   • Types: Marijuana    Comment: medical has  "not used it in months       FAMILY HISTORY  Family History   Problem Relation Age of Onset   • Heart Attack Paternal Grandmother        CURRENT MEDICATIONS  Home Medications     Reviewed by Madalyn Gallegos R.N. (Registered Nurse) on 07/04/22 at 1140  Med List Status: Partial   Medication Last Dose Status   aspirin EC (ECOTRIN) 81 MG Tablet Delayed Response  Active   calcium carbonate (TUMS) 500 MG Chew Tab  Active   Glucos-Chondroit-Hyaluron-MSM (GLUCOSAMINE CHONDROITIN JOINT PO)  Active   metformin (GLUCOPHAGE) 1000 MG tablet  Active   nitroglycerin (NITROSTAT) 0.4 MG SL Tab  Active   Omega-3 Fatty Acids (FISH OIL) 1000 MG Cap capsule  Active   ondansetron (ZOFRAN) 4 MG Tab tablet  Active   tamsulosin (FLOMAX) 0.4 MG capsule  Active   therapeutic multivitamin-minerals (THERAGRAN-M) Tab  Active                ALLERGIES  Allergies   Allergen Reactions   • Codeine Unspecified     Pt states he tolerates morphine 1/2017  Tachycardia   • Cortisone      crystalizes when injected   • [place] Swelling     Pt states \"My mouth swells up\".         PHYSICAL EXAM    VITAL SIGNS: BP (!) 148/99   Pulse 65   Temp 36.8 °C (98.3 °F) (Temporal)   Resp 18   Ht 1.803 m (5' 11\")   Wt 92.1 kg (203 lb)   SpO2 99%   BMI 28.31 kg/m²  @AGUSTINA[[id number]::@  Pulse ox interpretation: I interpret this pulse ox as normal.  Constitutional: Alert in no apparent distress.  HENT: Normocephalic, Atraumatic, Bilateral external ears normal. Nose normal.   Eyes: Pupils are equal and reactive. Conjunctiva normal, non-icteric.   Heart: Regular rate and rythm, no murmurs.    Lungs: Clear to auscultation bilaterally.  Skin: Warm, Dry, No erythema, No rash.   Neurologic: Alert, Grossly non-focal.   Psychiatric: Affect normal, Judgment normal, Mood normal, Appears appropriate and not intoxicated.   MSK:       LABS  Labs Reviewed - No data to display   All labs reviewed by me.    RADIOLOGY  DX-FEMUR-2+ RIGHT   Final Result      1.  Severe right knee " osteoarthritis      2.  osteoarthritis of the right hip joint as there is subchondral cyst within the femoral head        The radiologist's interpretation of all radiological studies have been reviewed by me.    COURSE & MEDICAL DECISION MAKING  Nursing notes, VS, PMSFHx reviewed in chart.  I verified that the patient was wearing a mask and I was wearing appropriate PPE every time I entered the room. The patient's mask was on the patient at all times during my encounter except for a brief view of the oropharynx.     1:16 PM Patient seen and examined at bedside.     67 y.o. male p/w CC of right thigh pain    Differential diagnosis includes is not limited to:    X-ray with no obvious fracture and patient discharged before incidental findings noted.  Patient with IT band focal pain and tenderness to palpation  Patient with no weakness or numbness.  Patient with no back midline pain.    No weakness or numbness to suggest cauda equina syndrome.  Pt w/ No trauma. No IVDU/fever. No history of cancer/unintentional weight loss. No bowel/bladder dysfunction. No numbness/weakness/saddle anesthesia.  Doubt infectious etiology given no fever    No e/o rash to suggest zoster  Pain likely represents MSK pain.  Pt given below meds for sx relief in ED  Pt agrees to f/u w/ PCP or physical therapist if pain persists for greater than 1 wk.  Pt instructed to return to ED if pain continues to persist or worsens.        Patient has had high blood pressure while in the emergency department, felt likely secondary to medical condition. Counseled patient to monitor blood pressure at home and follow up with primary care physician.      DISPOSITION:  Patient will be discharged home in stable condition.    FINAL IMPRESSION  1. It band syndrome, right    2. Strain of right hip, initial encounter    3. Fall, initial encounter         Electronically signed by: Shiva Dougherty M.D., 7/4/2022 1:16 PM

## 2022-07-12 ENCOUNTER — HOSPITAL ENCOUNTER (OUTPATIENT)
Dept: RADIOLOGY | Facility: MEDICAL CENTER | Age: 67
End: 2022-07-12
Attending: FAMILY MEDICINE
Payer: MEDICARE

## 2022-07-12 DIAGNOSIS — M25.551 RIGHT HIP PAIN: ICD-10-CM

## 2022-08-24 ENCOUNTER — HOSPITAL ENCOUNTER (OUTPATIENT)
Dept: LAB | Facility: MEDICAL CENTER | Age: 67
End: 2022-08-24
Attending: FAMILY MEDICINE
Payer: MEDICARE

## 2022-08-24 LAB
ANION GAP SERPL CALC-SCNC: 14 MMOL/L (ref 7–16)
BUN SERPL-MCNC: 16 MG/DL (ref 8–22)
CALCIUM SERPL-MCNC: 9.2 MG/DL (ref 8.5–10.5)
CHLORIDE SERPL-SCNC: 102 MMOL/L (ref 96–112)
CO2 SERPL-SCNC: 21 MMOL/L (ref 20–33)
CREAT SERPL-MCNC: 0.62 MG/DL (ref 0.5–1.4)
GFR SERPLBLD CREATININE-BSD FMLA CKD-EPI: 106 ML/MIN/1.73 M 2
GLUCOSE SERPL-MCNC: 369 MG/DL (ref 65–99)
POTASSIUM SERPL-SCNC: 4.4 MMOL/L (ref 3.6–5.5)
SODIUM SERPL-SCNC: 137 MMOL/L (ref 135–145)

## 2022-08-24 PROCEDURE — 80048 BASIC METABOLIC PNL TOTAL CA: CPT

## 2022-08-24 PROCEDURE — 36415 COLL VENOUS BLD VENIPUNCTURE: CPT

## 2022-08-31 ENCOUNTER — HOSPITAL ENCOUNTER (OUTPATIENT)
Dept: RADIOLOGY | Facility: MEDICAL CENTER | Age: 67
End: 2022-08-31
Attending: FAMILY MEDICINE
Payer: MEDICARE

## 2022-08-31 DIAGNOSIS — R10.9 ABDOMINAL PAIN, UNSPECIFIED ABDOMINAL LOCATION: ICD-10-CM

## 2022-08-31 PROCEDURE — 74177 CT ABD & PELVIS W/CONTRAST: CPT

## 2022-08-31 PROCEDURE — 700117 HCHG RX CONTRAST REV CODE 255: Performed by: FAMILY MEDICINE

## 2022-08-31 RX ADMIN — IOHEXOL 100 ML: 350 INJECTION, SOLUTION INTRAVENOUS at 16:05

## 2022-11-23 ENCOUNTER — OFFICE VISIT (OUTPATIENT)
Dept: URGENT CARE | Facility: PHYSICIAN GROUP | Age: 67
End: 2022-11-23
Payer: MEDICARE

## 2022-11-23 VITALS
BODY MASS INDEX: 29.96 KG/M2 | OXYGEN SATURATION: 95 % | RESPIRATION RATE: 16 BRPM | SYSTOLIC BLOOD PRESSURE: 116 MMHG | TEMPERATURE: 97.3 F | HEART RATE: 78 BPM | DIASTOLIC BLOOD PRESSURE: 70 MMHG | HEIGHT: 71 IN | WEIGHT: 214 LBS

## 2022-11-23 DIAGNOSIS — J22 NOSOCOMIAL INFECTION OF LOWER RESPIRATORY TRACT: ICD-10-CM

## 2022-11-23 PROCEDURE — 99213 OFFICE O/P EST LOW 20 MIN: CPT | Performed by: NURSE PRACTITIONER

## 2022-11-23 RX ORDER — GUAIFENESIN 600 MG/1
600 TABLET, EXTENDED RELEASE ORAL EVERY 12 HOURS
Qty: 28 TABLET | Refills: 0 | Status: SHIPPED | OUTPATIENT
Start: 2022-11-23 | End: 2022-12-07

## 2022-11-23 RX ORDER — AMOXICILLIN AND CLAVULANATE POTASSIUM 875; 125 MG/1; MG/1
1 TABLET, FILM COATED ORAL 2 TIMES DAILY
Qty: 14 TABLET | Refills: 0 | Status: SHIPPED | OUTPATIENT
Start: 2022-11-23 | End: 2022-11-30

## 2022-11-23 RX ORDER — AZITHROMYCIN 250 MG/1
TABLET, FILM COATED ORAL
Qty: 6 TABLET | Refills: 0 | Status: SHIPPED | OUTPATIENT
Start: 2022-11-23 | End: 2023-12-21

## 2022-11-23 RX ORDER — ALBUTEROL SULFATE 90 UG/1
2 AEROSOL, METERED RESPIRATORY (INHALATION) EVERY 6 HOURS PRN
Qty: 8.5 G | Refills: 0 | Status: SHIPPED | OUTPATIENT
Start: 2022-11-23

## 2022-11-23 ASSESSMENT — ENCOUNTER SYMPTOMS
WHEEZING: 1
GASTROINTESTINAL NEGATIVE: 1
MYALGIAS: 1
EYES NEGATIVE: 1
FEVER: 0
HEADACHES: 1
CONSTITUTIONAL NEGATIVE: 1
PSYCHIATRIC NEGATIVE: 1
COUGH: 1
RHINORRHEA: 1
CARDIOVASCULAR NEGATIVE: 1
SPUTUM PRODUCTION: 1
SHORTNESS OF BREATH: 1

## 2022-11-23 ASSESSMENT — FIBROSIS 4 INDEX: FIB4 SCORE: 0.7

## 2022-11-23 NOTE — PROGRESS NOTES
"Subjective:   Hossein Anderson . is a 67 y.o. male who presents for Cough (Pt states sick with flu )      Patient reports beginning with a flu-type illness about eight days ago. Patient reports that since that time, he has become progressively worsening with his coughing and is short of breath. He does have a history of asthma and has had pneumonia x3.  He states this is the worst he has felt in years, and he feels like he is getting pneumonia again.  He denies fever or chills. No chest pain, other than from coughing.  He is UTD on Covid, flu and pneumonia vaccines.  He did have a negative Covid test at home this morning.      Cough  This is a new problem. The current episode started 1 to 4 weeks ago (8 days ago). The problem has been gradually worsening. The problem occurs constantly. The cough is Productive of sputum. Associated symptoms include headaches, myalgias, nasal congestion, rhinorrhea, shortness of breath and wheezing. Pertinent negatives include no fever. The symptoms are aggravated by cold air and lying down. He has tried OTC cough suppressant, cool air and rest for the symptoms. The treatment provided mild relief. His past medical history is significant for asthma and pneumonia.     Review of Systems   Constitutional: Negative.  Negative for fever.   HENT:  Positive for congestion and rhinorrhea.    Eyes: Negative.    Respiratory:  Positive for cough, sputum production, shortness of breath and wheezing.    Cardiovascular: Negative.    Gastrointestinal: Negative.    Genitourinary: Negative.    Musculoskeletal:  Positive for myalgias.   Skin: Negative.    Neurological:  Positive for headaches.   Psychiatric/Behavioral: Negative.       Medications, Allergies, and current problem list reviewed today in Epic.     Objective:     /70   Pulse 78   Temp 36.3 °C (97.3 °F) (Temporal)   Resp 16   Ht 1.803 m (5' 11\")   Wt 97.1 kg (214 lb)   SpO2 95%     Physical Exam  Vitals reviewed. "   Constitutional:       Appearance: Normal appearance. He is ill-appearing.   HENT:      Head: Normocephalic and atraumatic.      Nose: Congestion and rhinorrhea present.      Mouth/Throat:      Mouth: Mucous membranes are moist.      Pharynx: Oropharynx is clear. Posterior oropharyngeal erythema present.   Eyes:      Extraocular Movements: Extraocular movements intact.      Conjunctiva/sclera: Conjunctivae normal.      Pupils: Pupils are equal, round, and reactive to light.   Cardiovascular:      Rate and Rhythm: Normal rate and regular rhythm.      Pulses: Normal pulses.      Heart sounds: Normal heart sounds.   Pulmonary:      Effort: Pulmonary effort is normal.      Breath sounds: Wheezing and rhonchi present.   Abdominal:      General: Abdomen is flat. Bowel sounds are normal.      Palpations: Abdomen is soft.   Musculoskeletal:         General: Normal range of motion.      Cervical back: Normal range of motion and neck supple.   Skin:     General: Skin is warm and dry.      Capillary Refill: Capillary refill takes less than 2 seconds.   Neurological:      General: No focal deficit present.      Mental Status: He is alert and oriented to person, place, and time.   Psychiatric:         Mood and Affect: Mood normal.         Behavior: Behavior normal.       Assessment/Plan:     Diagnosis and associated orders:     1. Nosocomial infection of lower respiratory tract  albuterol 108 (90 Base) MCG/ACT Aero Soln inhalation aerosol    guaiFENesin ER (MUCINEX) 600 MG TABLET SR 12 HR    amoxicillin-clavulanate (AUGMENTIN) 875-125 MG Tab    azithromycin (ZITHROMAX) 250 MG Tab         Comments/MDM:     Due to duration of symptoms, underlying asthma, and failure of OTC therapies- ABX was written to tx. For bacterial etiology for lower respiratory tract infection. Due to patient's history of asthma and frequent pneumonia, as well as his clinical exam he was given dual antibiotic treatment.   Continue OTC supportive therapies-  Flonase, OTC allergy meds, avoid night time dairy. Increase fluids. Humidification.   Patient given precautionary s/sx that mandate immediate follow up and evaluation in the ED. Advised of risks of not doing so.    DDX, Supportive care, and indications for immediate follow-up discussed with patient.    Instructed to return to clinic or nearest emergency department if we are not available for any change in condition, further concerns, or worsening of symptoms.    The patient demonstrated a good understanding and agreed with the treatment plan           Differential diagnosis, natural history, supportive care, and indications for immediate follow-up discussed.    Advised the patient to follow-up with the primary care physician for recheck, reevaluation, and consideration of further management.    Please note that this dictation was created using voice recognition software. I have made a reasonable attempt to correct obvious errors, but I expect that there are errors of grammar and possibly content that I did not discover before finalizing the note.    This note was electronically signed by JOSH Adhikari

## 2023-01-25 ENCOUNTER — HOSPITAL ENCOUNTER (OUTPATIENT)
Dept: LAB | Facility: MEDICAL CENTER | Age: 68
End: 2023-01-25
Attending: FAMILY MEDICINE
Payer: MEDICARE

## 2023-01-25 LAB
ALBUMIN SERPL BCP-MCNC: 4.4 G/DL (ref 3.2–4.9)
ALBUMIN/GLOB SERPL: 1.5 G/DL
ALP SERPL-CCNC: 55 U/L (ref 30–99)
ALT SERPL-CCNC: 32 U/L (ref 2–50)
ANION GAP SERPL CALC-SCNC: 11 MMOL/L (ref 7–16)
AST SERPL-CCNC: 22 U/L (ref 12–45)
BILIRUB SERPL-MCNC: 1 MG/DL (ref 0.1–1.5)
BUN SERPL-MCNC: 18 MG/DL (ref 8–22)
CALCIUM ALBUM COR SERPL-MCNC: 8.8 MG/DL (ref 8.5–10.5)
CALCIUM SERPL-MCNC: 9.1 MG/DL (ref 8.5–10.5)
CHLORIDE SERPL-SCNC: 102 MMOL/L (ref 96–112)
CHOLEST SERPL-MCNC: 157 MG/DL (ref 100–199)
CO2 SERPL-SCNC: 25 MMOL/L (ref 20–33)
CREAT SERPL-MCNC: 0.6 MG/DL (ref 0.5–1.4)
EST. AVERAGE GLUCOSE BLD GHB EST-MCNC: 275 MG/DL
FASTING STATUS PATIENT QL REPORTED: NORMAL
GFR SERPLBLD CREATININE-BSD FMLA CKD-EPI: 107 ML/MIN/1.73 M 2
GLOBULIN SER CALC-MCNC: 3 G/DL (ref 1.9–3.5)
GLUCOSE SERPL-MCNC: 255 MG/DL (ref 65–99)
HBA1C MFR BLD: 11.2 % (ref 4–5.6)
HDLC SERPL-MCNC: 38 MG/DL
LDLC SERPL CALC-MCNC: 93 MG/DL
POTASSIUM SERPL-SCNC: 4.1 MMOL/L (ref 3.6–5.5)
PROT SERPL-MCNC: 7.4 G/DL (ref 6–8.2)
SODIUM SERPL-SCNC: 138 MMOL/L (ref 135–145)
TRIGL SERPL-MCNC: 128 MG/DL (ref 0–149)
TSH SERPL DL<=0.005 MIU/L-ACNC: 4.37 UIU/ML (ref 0.38–5.33)

## 2023-01-25 PROCEDURE — 36415 COLL VENOUS BLD VENIPUNCTURE: CPT

## 2023-01-25 PROCEDURE — 80053 COMPREHEN METABOLIC PANEL: CPT

## 2023-01-25 PROCEDURE — 82570 ASSAY OF URINE CREATININE: CPT

## 2023-01-25 PROCEDURE — 83036 HEMOGLOBIN GLYCOSYLATED A1C: CPT | Mod: GA

## 2023-01-25 PROCEDURE — 82043 UR ALBUMIN QUANTITATIVE: CPT

## 2023-01-25 PROCEDURE — 84443 ASSAY THYROID STIM HORMONE: CPT

## 2023-01-25 PROCEDURE — 80061 LIPID PANEL: CPT

## 2023-01-26 LAB
CREAT UR-MCNC: 82.21 MG/DL
MICROALBUMIN UR-MCNC: <1.2 MG/DL
MICROALBUMIN/CREAT UR: NORMAL MG/G (ref 0–30)

## 2023-07-26 ENCOUNTER — HOSPITAL ENCOUNTER (OUTPATIENT)
Dept: LAB | Facility: MEDICAL CENTER | Age: 68
End: 2023-07-26
Attending: FAMILY MEDICINE
Payer: MEDICARE

## 2023-07-26 LAB
BASOPHILS # BLD AUTO: 1.1 % (ref 0–1.8)
BASOPHILS # BLD: 0.09 K/UL (ref 0–0.12)
CREAT UR-MCNC: 172.1 MG/DL
EOSINOPHIL # BLD AUTO: 0.21 K/UL (ref 0–0.51)
EOSINOPHIL NFR BLD: 2.5 % (ref 0–6.9)
ERYTHROCYTE [DISTWIDTH] IN BLOOD BY AUTOMATED COUNT: 46.7 FL (ref 35.9–50)
EST. AVERAGE GLUCOSE BLD GHB EST-MCNC: 226 MG/DL
HBA1C MFR BLD: 9.5 % (ref 4–5.6)
HCT VFR BLD AUTO: 45.9 % (ref 42–52)
HGB BLD-MCNC: 15.2 G/DL (ref 14–18)
IMM GRANULOCYTES # BLD AUTO: 0.06 K/UL (ref 0–0.11)
IMM GRANULOCYTES NFR BLD AUTO: 0.7 % (ref 0–0.9)
LYMPHOCYTES # BLD AUTO: 3.31 K/UL (ref 1–4.8)
LYMPHOCYTES NFR BLD: 39.7 % (ref 22–41)
MCH RBC QN AUTO: 30.4 PG (ref 27–33)
MCHC RBC AUTO-ENTMCNC: 33.1 G/DL (ref 32.3–36.5)
MCV RBC AUTO: 91.8 FL (ref 81.4–97.8)
MICROALBUMIN UR-MCNC: <1.2 MG/DL
MICROALBUMIN/CREAT UR: NORMAL MG/G (ref 0–30)
MONOCYTES # BLD AUTO: 0.68 K/UL (ref 0–0.85)
MONOCYTES NFR BLD AUTO: 8.2 % (ref 0–13.4)
NEUTROPHILS # BLD AUTO: 3.99 K/UL (ref 1.82–7.42)
NEUTROPHILS NFR BLD: 47.8 % (ref 44–72)
NRBC # BLD AUTO: 0 K/UL
NRBC BLD-RTO: 0 /100 WBC (ref 0–0.2)
PLATELET # BLD AUTO: 219 K/UL (ref 164–446)
PMV BLD AUTO: 10.8 FL (ref 9–12.9)
PSA SERPL-MCNC: 0.22 NG/ML (ref 0–4)
RBC # BLD AUTO: 5 M/UL (ref 4.7–6.1)
WBC # BLD AUTO: 8.3 K/UL (ref 4.8–10.8)

## 2023-07-26 PROCEDURE — 84153 ASSAY OF PSA TOTAL: CPT | Mod: GA

## 2023-07-26 PROCEDURE — 80061 LIPID PANEL: CPT

## 2023-07-26 PROCEDURE — 36415 COLL VENOUS BLD VENIPUNCTURE: CPT | Mod: GA

## 2023-07-26 PROCEDURE — 80053 COMPREHEN METABOLIC PANEL: CPT

## 2023-07-26 PROCEDURE — 83036 HEMOGLOBIN GLYCOSYLATED A1C: CPT | Mod: GA

## 2023-07-26 PROCEDURE — 85025 COMPLETE CBC W/AUTO DIFF WBC: CPT

## 2023-07-26 PROCEDURE — 82570 ASSAY OF URINE CREATININE: CPT

## 2023-07-26 PROCEDURE — 82043 UR ALBUMIN QUANTITATIVE: CPT

## 2023-07-27 LAB
ALBUMIN SERPL BCP-MCNC: 4.5 G/DL (ref 3.2–4.9)
ALBUMIN/GLOB SERPL: 1.5 G/DL
ALP SERPL-CCNC: 50 U/L (ref 30–99)
ALT SERPL-CCNC: 26 U/L (ref 2–50)
ANION GAP SERPL CALC-SCNC: 13 MMOL/L (ref 7–16)
AST SERPL-CCNC: 17 U/L (ref 12–45)
BILIRUB SERPL-MCNC: 1.1 MG/DL (ref 0.1–1.5)
BUN SERPL-MCNC: 19 MG/DL (ref 8–22)
CALCIUM ALBUM COR SERPL-MCNC: 8.9 MG/DL (ref 8.5–10.5)
CALCIUM SERPL-MCNC: 9.3 MG/DL (ref 8.5–10.5)
CHLORIDE SERPL-SCNC: 102 MMOL/L (ref 96–112)
CHOLEST SERPL-MCNC: 157 MG/DL (ref 100–199)
CO2 SERPL-SCNC: 23 MMOL/L (ref 20–33)
CREAT SERPL-MCNC: 0.63 MG/DL (ref 0.5–1.4)
FASTING STATUS PATIENT QL REPORTED: NORMAL
GFR SERPLBLD CREATININE-BSD FMLA CKD-EPI: 105 ML/MIN/1.73 M 2
GLOBULIN SER CALC-MCNC: 3 G/DL (ref 1.9–3.5)
GLUCOSE SERPL-MCNC: 199 MG/DL (ref 65–99)
HDLC SERPL-MCNC: 44 MG/DL
LDLC SERPL CALC-MCNC: 93 MG/DL
POTASSIUM SERPL-SCNC: 4.4 MMOL/L (ref 3.6–5.5)
PROT SERPL-MCNC: 7.5 G/DL (ref 6–8.2)
SODIUM SERPL-SCNC: 138 MMOL/L (ref 135–145)
TRIGL SERPL-MCNC: 101 MG/DL (ref 0–149)

## 2023-12-21 PROBLEM — S43.431A GLENOID LABRAL TEAR, RIGHT, INITIAL ENCOUNTER: Status: ACTIVE | Noted: 2023-12-21

## 2023-12-21 PROBLEM — M75.41 SUBACROMIAL IMPINGEMENT OF RIGHT SHOULDER: Status: ACTIVE | Noted: 2023-12-21

## 2023-12-21 PROBLEM — S46.011A TRAUMATIC ROTATOR CUFF TEAR, RIGHT, INITIAL ENCOUNTER: Status: ACTIVE | Noted: 2023-12-21

## 2023-12-21 PROBLEM — M75.21 BICEPS TENDONITIS, RIGHT: Status: ACTIVE | Noted: 2023-12-21

## 2023-12-21 ASSESSMENT — FIBROSIS 4 INDEX: FIB4 SCORE: 1

## 2024-01-29 ENCOUNTER — OFFICE VISIT (OUTPATIENT)
Dept: CARDIOLOGY | Facility: MEDICAL CENTER | Age: 69
End: 2024-01-29
Attending: INTERNAL MEDICINE
Payer: MEDICARE

## 2024-01-29 VITALS
SYSTOLIC BLOOD PRESSURE: 116 MMHG | RESPIRATION RATE: 16 BRPM | DIASTOLIC BLOOD PRESSURE: 80 MMHG | WEIGHT: 214 LBS | OXYGEN SATURATION: 96 % | HEIGHT: 71 IN | HEART RATE: 68 BPM | BODY MASS INDEX: 29.96 KG/M2

## 2024-01-29 DIAGNOSIS — E78.5 DYSLIPIDEMIA: ICD-10-CM

## 2024-01-29 DIAGNOSIS — R94.31 NONSPECIFIC ABNORMAL ELECTROCARDIOGRAM (ECG) (EKG): ICD-10-CM

## 2024-01-29 DIAGNOSIS — I25.10 CORONARY ARTERY DISEASE INVOLVING NATIVE CORONARY ARTERY OF NATIVE HEART WITHOUT ANGINA PECTORIS: Chronic | ICD-10-CM

## 2024-01-29 DIAGNOSIS — Z01.810 PREOP CARDIOVASCULAR EXAM: ICD-10-CM

## 2024-01-29 DIAGNOSIS — R07.89 OTHER CHEST PAIN: ICD-10-CM

## 2024-01-29 DIAGNOSIS — I10 ESSENTIAL HYPERTENSION, BENIGN: ICD-10-CM

## 2024-01-29 DIAGNOSIS — I70.0 ATHEROSCLEROSIS OF AORTA (HCC): ICD-10-CM

## 2024-01-29 PROCEDURE — 99204 OFFICE O/P NEW MOD 45 MIN: CPT | Performed by: INTERNAL MEDICINE

## 2024-01-29 PROCEDURE — 3079F DIAST BP 80-89 MM HG: CPT | Performed by: INTERNAL MEDICINE

## 2024-01-29 PROCEDURE — 99213 OFFICE O/P EST LOW 20 MIN: CPT | Performed by: INTERNAL MEDICINE

## 2024-01-29 PROCEDURE — 3074F SYST BP LT 130 MM HG: CPT | Performed by: INTERNAL MEDICINE

## 2024-01-29 RX ORDER — METOPROLOL SUCCINATE 25 MG/1
25 TABLET, EXTENDED RELEASE ORAL DAILY
Qty: 90 TABLET | Refills: 3 | Status: SHIPPED | OUTPATIENT
Start: 2024-01-29

## 2024-01-29 RX ORDER — LOSARTAN POTASSIUM 25 MG/1
25 TABLET ORAL DAILY
Qty: 90 TABLET | Refills: 3 | Status: SHIPPED | OUTPATIENT
Start: 2024-01-29

## 2024-01-29 ASSESSMENT — ENCOUNTER SYMPTOMS
BLURRED VISION: 0
ABDOMINAL PAIN: 0
PSYCHIATRIC NEGATIVE: 1
EYES NEGATIVE: 1
NEUROLOGICAL NEGATIVE: 1
CARDIOVASCULAR NEGATIVE: 1
WEAKNESS: 0
FEVER: 0
FOCAL WEAKNESS: 0
CLAUDICATION: 0
COUGH: 0
DEPRESSION: 0
NAUSEA: 0
CHILLS: 0
DOUBLE VISION: 0
NERVOUS/ANXIOUS: 0
WEIGHT LOSS: 0
HEADACHES: 0
BRUISES/BLEEDS EASILY: 0
MUSCULOSKELETAL NEGATIVE: 1
GASTROINTESTINAL NEGATIVE: 1
SHORTNESS OF BREATH: 0
PALPITATIONS: 0
MYALGIAS: 0
RESPIRATORY NEGATIVE: 1
VOMITING: 0
CONSTITUTIONAL NEGATIVE: 1
DIZZINESS: 0

## 2024-01-29 ASSESSMENT — FIBROSIS 4 INDEX: FIB4 SCORE: 1

## 2024-01-29 NOTE — PROGRESS NOTES
Chief Complaint   Patient presents with    Coronary Artery Disease     NP Dx       Subjective     Yakov Anderson Sr. is a 68 y.o. male who presents today as a consult from Branden Rosas for preoperative cardiovascular evaluation.     Thank you for allowing me to evaluate Mr. Anderson, who as you know is a 68 year old male with coronary artery disease (x 2 per patient in 1999 and 2000), diabetes mellitus, hypertension and hyperlipidemia, atherosclerosis of the aorta, previous tobacco abuse, family history of coronary artery disease. He is pending right shoulder surgery by Dr. Felipe at [facility]. He admits to occasional chest pain episodes which he takes nitroglycerine for. He denies shortness of breath, palpitations, nausea/vomiting or diaphoresis. He keeps active with yard work.      Past Medical History:   Diagnosis Date    Arthritis     L shoulder    Asthma     inhalers as needed    CAD (coronary artery disease)     2 MI in past 15 years ago    Cancer (HCC) 1988    leukemia    Cataract     Diabetes (HCC)     Hyperlipidemia     Hypertension     Myocardial infarct (HCC) 1999,2000    Stroke (HCC) 1990    no residual     Past Surgical History:   Procedure Laterality Date    PB SHLDR ARTHROSCOP,SURG,W/ROTAT CUFF REPB Left 3/14/2019    Procedure: SHOULDER ARTHROSCOPY W/ ROTATOR CUFF REPAIR;  Surgeon: Anne Mae M.D.;  Location: SURGERY [facility];  Service: Orthopedics    MA SHLDR ARTHROSCOP,PART ACROMIOPLAS Left 3/14/2019    Procedure: SHOULDER DECOMPRESSION ARTHROSCOPIC - SUBACROMIAL;  Surgeon: Anne Mae M.D.;  Location: SURGERY [facility];  Service: Orthopedics    PB ARTHROSCOPY SHOULDER SURGICAL BICEPS TENODES* Left 3/14/2019    Procedure: SHOULDER ARTHROSCOPY W/ BICIPITAL  - TENOTOMY;  Surgeon: Anne Mae M.D.;  Location: SURGERY [facility];  Service: Orthopedics    CLAVICLE DISTAL EXCISION Left 3/14/2019    Procedure: CLAVICLE DISTAL EXCISION;  Surgeon:  "Anne Mae M.D.;  Location: SURGERY [facility];  Service: Orthopedics    CATARACT EXTRACTION WITH IOL Bilateral 2005    CYSTOSCOPY      stone retrieval    SHOULDER ARTHROTOMY Right      Family History   Problem Relation Age of Onset    Heart Attack Paternal Grandmother      Social History     Socioeconomic History    Marital status:      Spouse name: Not on file    Number of children: Not on file    Years of education: Not on file    Highest education level: Not on file   Occupational History    Not on file   Tobacco Use    Smoking status: Former     Types: Cigars    Smokeless tobacco: Never   Vaping Use    Vaping Use: Never used   Substance and Sexual Activity    Alcohol use: Yes     Alcohol/week: 1.8 - 2.4 oz     Types: 3 - 4 Standard drinks or equivalent per week     Comment: rare    Drug use: Yes     Types: Marijuana     Comment: medical has not used it in months    Sexual activity: Not on file   Other Topics Concern    Not on file   Social History Narrative    Not on file     Social Determinants of Health     Financial Resource Strain: Not on file   Food Insecurity: Not on file   Transportation Needs: Not on file   Physical Activity: Not on file   Stress: Not on file   Social Connections: Not on file   Intimate Partner Violence: Not on file   Housing Stability: Not on file     Allergies   Allergen Reactions    Codeine Unspecified     Pt states he tolerates morphine 1/2017  Tachycardia    Cortisone      crystalizes when injected    [place] Swelling     Pt states \"My mouth swells up\".       (Medications reviewed.)   Outpatient Encounter Medications as of 1/29/2024   Medication Sig Dispense Refill    multivitamin Tab Take 1 Tablet by mouth every day.      montelukast (SINGULAIR) 10 MG Tab Take 10 mg by mouth every day.      OZEMPIC, 0.25 OR 0.5 MG/DOSE, 2 MG/3ML Solution Pen-injector INJECT 0.5MG UNDER THE SKIN ONCE WEEKLY      albuterol 108 (90 Base) MCG/ACT Aero Soln inhalation aerosol " "Inhale 2 Puffs every 6 hours as needed for Shortness of Breath. 8.5 g 0    atorvastatin (LIPITOR) 20 MG Tab Take 20 mg by mouth every day.      nitroglycerin (NITROSTAT) 0.4 MG SL Tab Place 1 Tab under tongue as needed for Chest Pain. 25 Tab 0    metformin (GLUCOPHAGE) 1000 MG tablet Take 1,000 mg by mouth 2 times a day, with meals.      calcium carbonate (TUMS) 500 MG Chew Tab Take 500 mg by mouth as needed (For upset stomach).      tamsulosin (FLOMAX) 0.4 MG capsule Take 0.4 mg by mouth every bedtime.      aspirin EC (ECOTRIN) 81 MG Tablet Delayed Response Take 81 mg by mouth every bedtime.      Glucos-Chondroit-Hyaluron-MSM (GLUCOSAMINE CHONDROITIN JOINT PO) Take 1 Tab by mouth every day.       No facility-administered encounter medications on file as of 1/29/2024.     Review of Systems   Constitutional: Negative.  Negative for chills, fever, malaise/fatigue and weight loss.   HENT: Negative.  Negative for hearing loss.    Eyes: Negative.  Negative for blurred vision and double vision.   Respiratory: Negative.  Negative for cough and shortness of breath.    Cardiovascular: Negative.  Negative for chest pain, palpitations, claudication and leg swelling.   Gastrointestinal: Negative.  Negative for abdominal pain, nausea and vomiting.   Genitourinary: Negative.  Negative for dysuria and urgency.   Musculoskeletal: Negative.  Negative for joint pain and myalgias.   Skin: Negative.  Negative for itching and rash.   Neurological: Negative.  Negative for dizziness, focal weakness, weakness and headaches.   Endo/Heme/Allergies: Negative.  Does not bruise/bleed easily.   Psychiatric/Behavioral: Negative.  Negative for depression. The patient is not nervous/anxious.               Objective     /80 (BP Location: Left arm, Patient Position: Sitting, BP Cuff Size: Adult)   Pulse 68   Resp 16   Ht 1.803 m (5' 11\")   Wt 97.1 kg (214 lb)   SpO2 96%   BMI 29.85 kg/m²     Physical Exam  Constitutional:       " Appearance: Normal appearance. He is well-developed and normal weight.   HENT:      Head: Normocephalic and atraumatic.   Neck:      Vascular: No JVD.   Cardiovascular:      Rate and Rhythm: Normal rate and regular rhythm.      Heart sounds: Normal heart sounds.   Pulmonary:      Effort: Pulmonary effort is normal.      Breath sounds: Normal breath sounds.   Abdominal:      General: Bowel sounds are normal.      Palpations: Abdomen is soft.      Comments: No hepatosplenomegaly.   Musculoskeletal:         General: Normal range of motion.   Lymphadenopathy:      Cervical: No cervical adenopathy.   Skin:     General: Skin is warm and dry.   Neurological:      Mental Status: He is alert and oriented to person, place, and time.            CARDIAC STUDIES/PROCEDURES:    CT OF ABDOMEN (08/31/22)  Aortic atherosclerosis without aneurysm.   (study result reviewed)     ECHOCARDIOGRAM CONCLUSIONS (03/29/17)  No prior study is available for comparison.   Normal left ventricular systolic function.  Left ventricular ejection fraction is visually estimated to be 65%.  Grade I diastolic dysfunction.  No significant valve abnormalities.   Estimated right ventricular systolic pressure is 20 mmHg.  (study result reviewed)     EKG performed on (01/04/24) was reviewed: EKG personally interpreted shows sinus rhythm with non-specific ST segment abnormalities.     Laboratory results of (07/26/23) were reviewed. Cholesterol profile of 157/101/44/93 mg/dL noted.     MYOCARDIAL PERFUSION STUDY CONCLUSIONS (01/31/17)  NUCLEAR IMAGING INTERPRETATION  No evidence of significant jeopardized viable myocardium or prior myocardial infarction.  Normal left ventricular size, ejection fraction, and wall motion.  ECG INTERPRETATION  Negative stress ECG for ischemia.  (study result reviewed)     Assessment & Plan     1. Preop cardiovascular exam        2. Nonspecific abnormal electrocardiogram (ECG) (EKG)        3. Other chest pain        4. Coronary  artery disease involving native coronary artery of native heart without angina pectoris        5. Essential hypertension, benign        6. Dyslipidemia        7. Atherosclerosis of aorta (HCC)            Medical Decision Making: Today's Assessment/Status/Plan:        Presurgical evaluation with abnormal EKG: He is pending right shoulder surgery by Dr. Felipe at [facility] and his EKG was abnormal as described. We will perform echocardiogram and myocardial perfusion imaging study and follow up with him.  Chest pain: He experiences chest pain with his asthma attacks. Plan as above.  Coronary artery disease (x 2 per patient in 1999 and 2000): Plan as above.  Hypertension: Blood pressure has been high. We will start metoprolol and reassess the blood pressure.  Hyperlipidemia: He is doing well on statin therapy without myalgia symptoms.  Atherosclerosis of the aorta     We will follow up in 3 months.    Thank you for this consult.

## 2024-02-01 ENCOUNTER — HOSPITAL ENCOUNTER (OUTPATIENT)
Dept: RADIOLOGY | Facility: MEDICAL CENTER | Age: 69
End: 2024-02-01
Attending: INTERNAL MEDICINE
Payer: MEDICARE

## 2024-02-01 DIAGNOSIS — I25.10 CORONARY ARTERY DISEASE INVOLVING NATIVE CORONARY ARTERY OF NATIVE HEART WITHOUT ANGINA PECTORIS: Chronic | ICD-10-CM

## 2024-02-01 DIAGNOSIS — R07.89 OTHER CHEST PAIN: ICD-10-CM

## 2024-02-01 DIAGNOSIS — I10 ESSENTIAL HYPERTENSION, BENIGN: ICD-10-CM

## 2024-02-01 DIAGNOSIS — R94.31 NONSPECIFIC ABNORMAL ELECTROCARDIOGRAM (ECG) (EKG): ICD-10-CM

## 2024-02-01 PROCEDURE — 78452 HT MUSCLE IMAGE SPECT MULT: CPT | Mod: 26 | Performed by: INTERNAL MEDICINE

## 2024-02-01 PROCEDURE — 93018 CV STRESS TEST I&R ONLY: CPT | Performed by: INTERNAL MEDICINE

## 2024-02-01 PROCEDURE — A9502 TC99M TETROFOSMIN: HCPCS

## 2024-02-07 ENCOUNTER — TELEPHONE (OUTPATIENT)
Dept: CARDIOLOGY | Facility: MEDICAL CENTER | Age: 69
End: 2024-02-07
Payer: MEDICARE

## 2024-02-07 NOTE — TELEPHONE ENCOUNTER
JAISON    Caller: Hossein Anderson     Topic/issue: Pt states the surgical clearance from the appt with JAISON  on 01/29/2024 has not been sent to Dr. Rooney office. They are trying to locate where the paperwork is.    Pt states their was no form and JAISON said he would just send it over to his Primary care Dr. I did advise to have [facility] send one in since that is who will be doing his surgery.    Dr. Rooney Fax:  [phone]    Please call the patient once done or for any questions.    Callback Number: [phone] (home)       Thank You    Allison ORDAZ

## 2024-02-12 ENCOUNTER — TELEPHONE (OUTPATIENT)
Dept: CARDIOLOGY | Facility: MEDICAL CENTER | Age: 69
End: 2024-02-12
Payer: MEDICARE

## 2024-02-12 NOTE — LETTER
PROCEDURE/SURGERY CLEARANCE FORM      Encounter Date: 2.12.2024    Patient: Yakov Anderson  YOB: 1955    CARDIOLOGIST:  Calin Wild M.D.      Reason: Preoperative clearance still pending cardiac studies. Please resend when completed.        I cannot release the above patient for the following procedure/surgery: Right shoulder arthroscopy, rotator cuff repair, biceps tenodesis, subacromial decompression, labral debridement, repairs as indicated                  Calin Wild M.D.  Electronically signed

## 2024-02-12 NOTE — TELEPHONE ENCOUNTER
Last OV: 1.29.2024  Proposed Surgery: Right shoulder arthroscopy, rotator cuff repair, biceps tenodesis, subacromial decompression, labral debridement, repairs as indicated  Surgery Date: TBD  Requesting Office Name: [facility]   Fax Number: [phone]  Preference of Location (default is surgery center unless specified by Cardiologist or MODE)  Prior Clearance Addressed: Yes       Anticoags/Antiplatelets: Aspirin  Anticoags/Antiplatelet managed by Cardiology? No Provider to advise.    Outstanding Cardiac Imaging : Yes  Echo.   Clearance to provider to review  Stent, Cardiac Devices, or Catheterization: No  Ablation, TAVR/Valve (including open heart), Cardioversion: No  Recent Cardiac Hospitalization: No            When: Greater than 3 months since hospitalization   History (cardiac history):   Past Medical History:   Diagnosis Date    Arthritis     L shoulder    Asthma     inhalers as needed    CAD (coronary artery disease)     2 MI in past 15 years ago    Cancer (HCC) 1988    leukemia    Cataract     Diabetes (HCC)     Hyperlipidemia     Hypertension     Myocardial infarct (HCC) 1999,2000    Stroke (HCC) 1990    no residual             Surgical Clearance Letter Sent: No Provider to advise.   **Scan clearance request letter into [facility].**

## 2024-02-20 LAB — HBA1C MFR BLD: 9.8 % (ref ?–5.8)

## 2024-03-11 ENCOUNTER — HOSPITAL ENCOUNTER (OUTPATIENT)
Dept: CARDIOLOGY | Facility: MEDICAL CENTER | Age: 69
End: 2024-03-11
Attending: INTERNAL MEDICINE
Payer: MEDICARE

## 2024-03-11 ENCOUNTER — TELEPHONE (OUTPATIENT)
Dept: CARDIOLOGY | Facility: MEDICAL CENTER | Age: 69
End: 2024-03-11

## 2024-03-11 DIAGNOSIS — R94.31 NONSPECIFIC ABNORMAL ELECTROCARDIOGRAM (ECG) (EKG): ICD-10-CM

## 2024-03-11 DIAGNOSIS — R07.89 OTHER CHEST PAIN: ICD-10-CM

## 2024-03-11 DIAGNOSIS — I25.10 CORONARY ARTERY DISEASE INVOLVING NATIVE CORONARY ARTERY OF NATIVE HEART WITHOUT ANGINA PECTORIS: Chronic | ICD-10-CM

## 2024-03-11 DIAGNOSIS — I10 ESSENTIAL HYPERTENSION, BENIGN: ICD-10-CM

## 2024-03-11 LAB
LV EJECT FRACT  99904: 55
LV EJECT FRACT MOD 2C 99903: 52.99
LV EJECT FRACT MOD 4C 99902: 53.68
LV EJECT FRACT MOD BP 99901: 53.67

## 2024-03-11 PROCEDURE — 700117 HCHG RX CONTRAST REV CODE 255: Performed by: INTERNAL MEDICINE

## 2024-03-11 PROCEDURE — 93306 TTE W/DOPPLER COMPLETE: CPT | Mod: 26 | Performed by: INTERNAL MEDICINE

## 2024-03-11 PROCEDURE — 93306 TTE W/DOPPLER COMPLETE: CPT

## 2024-03-11 RX ADMIN — HUMAN ALBUMIN MICROSPHERES AND PERFLUTREN 3 ML: 10; .22 INJECTION, SOLUTION INTRAVENOUS at 10:30

## 2024-03-11 NOTE — TELEPHONE ENCOUNTER
----- Message from Calin Wild M.D. sent at 3/11/2024  1:25 PM PDT -----  Please notify patient of unremarkable echocardiogram. Please send surgical clearance to Dr. Felipe at [facility].   The may proceed with surgery from cardiac standpoint with moderate risk. Thank you.  YOLI

## 2024-03-11 NOTE — TELEPHONE ENCOUNTER
Called pt at [phone] and updated on results of echo and surgical clearance.    Surgical clearance drafted and faxed

## 2024-03-11 NOTE — LETTER
PROCEDURE/SURGERY CLEARANCE FORM    Encounter Date: 3/11/2024    Patient: Hossein Anderson  YOB: 1955    CARDIOLOGIST:  Calin Wild MD      The above patient is cleared to have the following procedure/surgery: Right shoulder arthroscopy, rotator cuff repair, biceps tenodesis, subacromial decompression, labral debridement, repairs as indicated                                            Dear Surgeon or Proceduralist,      Thank you for your request for cardiac stratification of our mutual patient Hossein Anderson 1955. We have reviewed their [facility] records; and to the best of our understanding this patient has not had stenting, ablation, cardiothoracic surgery or hospitalization for cardiovascular reasons in the past 6 months. The may proceed with surgery from cardiac standpoint with moderate risk.      Aspirin or Prasugrel   - hold 7 days prior to procedure/surgery, resume when hemodynamically stable      If Hossein Anderson 1955 has new symptoms of heart failure decompensation, unstable arrythmia, or angina please reach out and we will assess the patient.      If you have other patient-specific concerns, please feel free to reach out to the patient's cardiologist directly at [phone].     Thank you,       [facility]    Electronically signed         Dr. Calin Wild

## 2024-04-02 ENCOUNTER — PRE-ADMISSION TESTING (OUTPATIENT)
Dept: ADMISSIONS | Facility: MEDICAL CENTER | Age: 69
End: 2024-04-02
Attending: ORTHOPAEDIC SURGERY
Payer: MEDICARE

## 2024-04-02 VITALS — HEIGHT: 71 IN | BODY MASS INDEX: 29.85 KG/M2

## 2024-04-02 DIAGNOSIS — Z01.812 PRE-OPERATIVE LABORATORY EXAMINATION: ICD-10-CM

## 2024-04-02 NOTE — OR NURSING
PreAdmit Telephone Appointment: Reviewed the Preparing for your procedure handout with patient over the phone. Patient instructed per pharmacy guidelines regarding taking, holding or contacting provider for instructions on regularly prescribed medications before surgery. Instructed to take the following medications the day of surgery with a sip of water per pharmacy medication guidelines: Metoprolol, Singulair, Flomax, Albuterol inhaler as needed, Nitrostat as needed.     Per pharmacy guidelines Instructed to hold Ozempic, vitamins/supplements 7 days prior. Instructed to hold Losartan 24 hours prior. Instructed to hold Metformin DOS.    Confirmed with patient where to check in morning of surgery. Handouts/Brochure/Video & Medication instructions emailed to patient.    Testing appointment scheduled for 4/8 at 11am.

## 2024-04-08 ENCOUNTER — PRE-ADMISSION TESTING (OUTPATIENT)
Dept: ADMISSIONS | Facility: MEDICAL CENTER | Age: 69
End: 2024-04-08
Attending: ORTHOPAEDIC SURGERY
Payer: MEDICARE

## 2024-04-08 DIAGNOSIS — Z01.812 PRE-OPERATIVE LABORATORY EXAMINATION: ICD-10-CM

## 2024-04-08 LAB
ANION GAP SERPL CALC-SCNC: 13 MMOL/L (ref 7–16)
BUN SERPL-MCNC: 16 MG/DL (ref 8–22)
CALCIUM SERPL-MCNC: 9.8 MG/DL (ref 8.4–10.2)
CHLORIDE SERPL-SCNC: 102 MMOL/L (ref 96–112)
CO2 SERPL-SCNC: 22 MMOL/L (ref 20–33)
CREAT SERPL-MCNC: 0.63 MG/DL (ref 0.5–1.4)
ERYTHROCYTE [DISTWIDTH] IN BLOOD BY AUTOMATED COUNT: 44.7 FL (ref 35.9–50)
GFR SERPLBLD CREATININE-BSD FMLA CKD-EPI: 104 ML/MIN/1.73 M 2
GLUCOSE SERPL-MCNC: 117 MG/DL (ref 65–99)
HCT VFR BLD AUTO: 46.5 % (ref 42–52)
HGB BLD-MCNC: 15.9 G/DL (ref 14–18)
MCH RBC QN AUTO: 30.8 PG (ref 27–33)
MCHC RBC AUTO-ENTMCNC: 34.2 G/DL (ref 32.3–36.5)
MCV RBC AUTO: 89.9 FL (ref 81.4–97.8)
PLATELET # BLD AUTO: 236 K/UL (ref 164–446)
PMV BLD AUTO: 10.6 FL (ref 9–12.9)
POTASSIUM SERPL-SCNC: 4.5 MMOL/L (ref 3.6–5.5)
RBC # BLD AUTO: 5.17 M/UL (ref 4.7–6.1)
SODIUM SERPL-SCNC: 137 MMOL/L (ref 135–145)
WBC # BLD AUTO: 8.7 K/UL (ref 4.8–10.8)

## 2024-04-08 PROCEDURE — 36415 COLL VENOUS BLD VENIPUNCTURE: CPT

## 2024-04-08 PROCEDURE — 80048 BASIC METABOLIC PNL TOTAL CA: CPT

## 2024-04-08 PROCEDURE — 85027 COMPLETE CBC AUTOMATED: CPT

## 2024-04-09 ENCOUNTER — HOSPITAL ENCOUNTER (OUTPATIENT)
Facility: MEDICAL CENTER | Age: 69
End: 2024-04-09
Attending: ORTHOPAEDIC SURGERY | Admitting: ORTHOPAEDIC SURGERY
Payer: MEDICARE

## 2024-04-09 ENCOUNTER — ANESTHESIA (OUTPATIENT)
Dept: SURGERY | Facility: MEDICAL CENTER | Age: 69
End: 2024-04-09
Payer: MEDICARE

## 2024-04-09 ENCOUNTER — ANESTHESIA EVENT (OUTPATIENT)
Dept: SURGERY | Facility: MEDICAL CENTER | Age: 69
End: 2024-04-09
Payer: MEDICARE

## 2024-04-09 VITALS
HEART RATE: 83 BPM | RESPIRATION RATE: 17 BRPM | OXYGEN SATURATION: 92 % | SYSTOLIC BLOOD PRESSURE: 115 MMHG | DIASTOLIC BLOOD PRESSURE: 67 MMHG | BODY MASS INDEX: 27.61 KG/M2 | TEMPERATURE: 97 F | HEIGHT: 71 IN | WEIGHT: 197.2 LBS

## 2024-04-09 DIAGNOSIS — S43.431A GLENOID LABRAL TEAR, RIGHT, INITIAL ENCOUNTER: ICD-10-CM

## 2024-04-09 DIAGNOSIS — S46.011A TRAUMATIC ROTATOR CUFF TEAR, RIGHT, INITIAL ENCOUNTER: ICD-10-CM

## 2024-04-09 DIAGNOSIS — M75.41 SUBACROMIAL IMPINGEMENT OF RIGHT SHOULDER: ICD-10-CM

## 2024-04-09 DIAGNOSIS — M75.21 BICEPS TENDONITIS, RIGHT: ICD-10-CM

## 2024-04-09 DIAGNOSIS — G89.18 POST-OP PAIN: ICD-10-CM

## 2024-04-09 LAB — GLUCOSE BLD STRIP.AUTO-MCNC: 123 MG/DL (ref 65–99)

## 2024-04-09 PROCEDURE — 160041 HCHG SURGERY MINUTES - EA ADDL 1 MIN LEVEL 4: Performed by: ORTHOPAEDIC SURGERY

## 2024-04-09 PROCEDURE — 700111 HCHG RX REV CODE 636 W/ 250 OVERRIDE (IP): Mod: JZ | Performed by: ORTHOPAEDIC SURGERY

## 2024-04-09 PROCEDURE — 700105 HCHG RX REV CODE 258: Performed by: ORTHOPAEDIC SURGERY

## 2024-04-09 PROCEDURE — 700101 HCHG RX REV CODE 250: Performed by: ANESTHESIOLOGY

## 2024-04-09 PROCEDURE — 64415 NJX AA&/STRD BRCH PLXS IMG: CPT | Performed by: ORTHOPAEDIC SURGERY

## 2024-04-09 PROCEDURE — 29827 SHO ARTHRS SRG RT8TR CUF RPR: CPT | Mod: ASROC,RT | Performed by: PHYSICIAN ASSISTANT

## 2024-04-09 PROCEDURE — 160046 HCHG PACU - 1ST 60 MINS PHASE II: Performed by: ORTHOPAEDIC SURGERY

## 2024-04-09 PROCEDURE — 160029 HCHG SURGERY MINUTES - 1ST 30 MINS LEVEL 4: Performed by: ORTHOPAEDIC SURGERY

## 2024-04-09 PROCEDURE — C1713 ANCHOR/SCREW BN/BN,TIS/BN: HCPCS | Performed by: ORTHOPAEDIC SURGERY

## 2024-04-09 PROCEDURE — A9270 NON-COVERED ITEM OR SERVICE: HCPCS | Performed by: ANESTHESIOLOGY

## 2024-04-09 PROCEDURE — 29827 SHO ARTHRS SRG RT8TR CUF RPR: CPT | Mod: RT | Performed by: ORTHOPAEDIC SURGERY

## 2024-04-09 PROCEDURE — 160009 HCHG ANES TIME/MIN: Performed by: ORTHOPAEDIC SURGERY

## 2024-04-09 PROCEDURE — 82962 GLUCOSE BLOOD TEST: CPT

## 2024-04-09 PROCEDURE — 160048 HCHG OR STATISTICAL LEVEL 1-5: Performed by: ORTHOPAEDIC SURGERY

## 2024-04-09 PROCEDURE — 700111 HCHG RX REV CODE 636 W/ 250 OVERRIDE (IP): Performed by: ANESTHESIOLOGY

## 2024-04-09 PROCEDURE — 29826 SHO ARTHRS SRG DECOMPRESSION: CPT | Mod: RT | Performed by: ORTHOPAEDIC SURGERY

## 2024-04-09 PROCEDURE — 29826 SHO ARTHRS SRG DECOMPRESSION: CPT | Mod: ASROC,RT | Performed by: PHYSICIAN ASSISTANT

## 2024-04-09 PROCEDURE — 160002 HCHG RECOVERY MINUTES (STAT): Performed by: ORTHOPAEDIC SURGERY

## 2024-04-09 PROCEDURE — 29823 SHO ARTHRS SRG XTNSV DBRDMT: CPT | Mod: ASROC,RT | Performed by: PHYSICIAN ASSISTANT

## 2024-04-09 PROCEDURE — 700111 HCHG RX REV CODE 636 W/ 250 OVERRIDE (IP): Mod: JZ | Performed by: ANESTHESIOLOGY

## 2024-04-09 PROCEDURE — 502000 HCHG MISC OR IMPLANTS RC 0278: Performed by: ORTHOPAEDIC SURGERY

## 2024-04-09 PROCEDURE — 160025 RECOVERY II MINUTES (STATS): Performed by: ORTHOPAEDIC SURGERY

## 2024-04-09 PROCEDURE — 700101 HCHG RX REV CODE 250: Performed by: ORTHOPAEDIC SURGERY

## 2024-04-09 PROCEDURE — 700102 HCHG RX REV CODE 250 W/ 637 OVERRIDE(OP): Performed by: ANESTHESIOLOGY

## 2024-04-09 PROCEDURE — 29823 SHO ARTHRS SRG XTNSV DBRDMT: CPT | Mod: RT | Performed by: ORTHOPAEDIC SURGERY

## 2024-04-09 PROCEDURE — 160035 HCHG PACU - 1ST 60 MINS PHASE I: Performed by: ORTHOPAEDIC SURGERY

## 2024-04-09 DEVICE — IMPLANTABLE DEVICE: Type: IMPLANTABLE DEVICE | Site: SHOULDER | Status: FUNCTIONAL

## 2024-04-09 RX ORDER — CEFAZOLIN SODIUM 1 G/3ML
2 INJECTION, POWDER, FOR SOLUTION INTRAMUSCULAR; INTRAVENOUS ONCE
Status: DISCONTINUED | OUTPATIENT
Start: 2024-04-09 | End: 2024-04-09 | Stop reason: HOSPADM

## 2024-04-09 RX ORDER — TRANEXAMIC ACID 100 MG/ML
INJECTION, SOLUTION INTRAVENOUS PRN
Status: DISCONTINUED | OUTPATIENT
Start: 2024-04-09 | End: 2024-04-09 | Stop reason: SURG

## 2024-04-09 RX ORDER — CLINDAMYCIN PHOSPHATE 150 MG/ML
INJECTION, SOLUTION INTRAVENOUS
Status: DISCONTINUED | OUTPATIENT
Start: 2024-04-09 | End: 2024-04-09 | Stop reason: HOSPADM

## 2024-04-09 RX ORDER — MIDAZOLAM HYDROCHLORIDE 1 MG/ML
INJECTION INTRAMUSCULAR; INTRAVENOUS PRN
Status: DISCONTINUED | OUTPATIENT
Start: 2024-04-09 | End: 2024-04-09 | Stop reason: SURG

## 2024-04-09 RX ORDER — EPHEDRINE SULFATE 50 MG/ML
INJECTION, SOLUTION INTRAVENOUS PRN
Status: DISCONTINUED | OUTPATIENT
Start: 2024-04-09 | End: 2024-04-09 | Stop reason: SURG

## 2024-04-09 RX ORDER — KETOROLAC TROMETHAMINE 15 MG/ML
INJECTION, SOLUTION INTRAMUSCULAR; INTRAVENOUS PRN
Status: DISCONTINUED | OUTPATIENT
Start: 2024-04-09 | End: 2024-04-09 | Stop reason: SURG

## 2024-04-09 RX ORDER — GLYCOPYRROLATE 0.2 MG/ML
INJECTION INTRAMUSCULAR; INTRAVENOUS PRN
Status: DISCONTINUED | OUTPATIENT
Start: 2024-04-09 | End: 2024-04-09 | Stop reason: SURG

## 2024-04-09 RX ORDER — MEPERIDINE HYDROCHLORIDE 25 MG/ML
12.5 INJECTION INTRAMUSCULAR; INTRAVENOUS; SUBCUTANEOUS
Status: DISCONTINUED | OUTPATIENT
Start: 2024-04-09 | End: 2024-04-09 | Stop reason: HOSPADM

## 2024-04-09 RX ORDER — EPINEPHRINE 1 MG/ML(1)
AMPUL (ML) INJECTION
Status: DISCONTINUED | OUTPATIENT
Start: 2024-04-09 | End: 2024-04-09 | Stop reason: HOSPADM

## 2024-04-09 RX ORDER — BUPIVACAINE HYDROCHLORIDE AND EPINEPHRINE 2.5; 5 MG/ML; UG/ML
INJECTION, SOLUTION EPIDURAL; INFILTRATION; INTRACAUDAL; PERINEURAL
Status: DISCONTINUED | OUTPATIENT
Start: 2024-04-09 | End: 2024-04-09 | Stop reason: HOSPADM

## 2024-04-09 RX ORDER — ONDANSETRON 4 MG/1
4 TABLET, FILM COATED ORAL EVERY 4 HOURS PRN
Qty: 20 TABLET | Refills: 0 | Status: SHIPPED | OUTPATIENT
Start: 2024-04-09

## 2024-04-09 RX ORDER — LIDOCAINE HYDROCHLORIDE 20 MG/ML
INJECTION, SOLUTION EPIDURAL; INFILTRATION; INTRACAUDAL; PERINEURAL PRN
Status: DISCONTINUED | OUTPATIENT
Start: 2024-04-09 | End: 2024-04-09 | Stop reason: SURG

## 2024-04-09 RX ORDER — MIDAZOLAM HYDROCHLORIDE 1 MG/ML
1 INJECTION INTRAMUSCULAR; INTRAVENOUS
Status: DISCONTINUED | OUTPATIENT
Start: 2024-04-09 | End: 2024-04-09 | Stop reason: HOSPADM

## 2024-04-09 RX ORDER — CEFAZOLIN SODIUM 1 G/3ML
INJECTION, POWDER, FOR SOLUTION INTRAMUSCULAR; INTRAVENOUS PRN
Status: DISCONTINUED | OUTPATIENT
Start: 2024-04-09 | End: 2024-04-09 | Stop reason: SURG

## 2024-04-09 RX ORDER — ONDANSETRON 2 MG/ML
4 INJECTION INTRAMUSCULAR; INTRAVENOUS
Status: COMPLETED | OUTPATIENT
Start: 2024-04-09 | End: 2024-04-09

## 2024-04-09 RX ORDER — ROCURONIUM BROMIDE 10 MG/ML
INJECTION, SOLUTION INTRAVENOUS PRN
Status: DISCONTINUED | OUTPATIENT
Start: 2024-04-09 | End: 2024-04-09 | Stop reason: SURG

## 2024-04-09 RX ORDER — DEXMEDETOMIDINE HYDROCHLORIDE 100 UG/ML
INJECTION, SOLUTION INTRAVENOUS PRN
Status: DISCONTINUED | OUTPATIENT
Start: 2024-04-09 | End: 2024-04-09 | Stop reason: SURG

## 2024-04-09 RX ORDER — OXYCODONE HCL 5 MG/5 ML
5 SOLUTION, ORAL ORAL
Status: COMPLETED | OUTPATIENT
Start: 2024-04-09 | End: 2024-04-09

## 2024-04-09 RX ORDER — DEXAMETHASONE SODIUM PHOSPHATE 4 MG/ML
INJECTION, SOLUTION INTRA-ARTICULAR; INTRALESIONAL; INTRAMUSCULAR; INTRAVENOUS; SOFT TISSUE PRN
Status: DISCONTINUED | OUTPATIENT
Start: 2024-04-09 | End: 2024-04-09 | Stop reason: SURG

## 2024-04-09 RX ORDER — LIDOCAINE HYDROCHLORIDE 40 MG/ML
SOLUTION TOPICAL PRN
Status: DISCONTINUED | OUTPATIENT
Start: 2024-04-09 | End: 2024-04-09 | Stop reason: SURG

## 2024-04-09 RX ORDER — HALOPERIDOL 5 MG/ML
1 INJECTION INTRAMUSCULAR
Status: DISCONTINUED | OUTPATIENT
Start: 2024-04-09 | End: 2024-04-09 | Stop reason: HOSPADM

## 2024-04-09 RX ORDER — OXYCODONE HCL 5 MG/5 ML
10 SOLUTION, ORAL ORAL
Status: COMPLETED | OUTPATIENT
Start: 2024-04-09 | End: 2024-04-09

## 2024-04-09 RX ORDER — NAPROXEN 500 MG/1
500 TABLET ORAL 2 TIMES DAILY WITH MEALS
Qty: 10 TABLET | Refills: 0 | Status: SHIPPED | OUTPATIENT
Start: 2024-04-09 | End: 2024-04-14

## 2024-04-09 RX ORDER — HYDROMORPHONE HYDROCHLORIDE 1 MG/ML
0.4 INJECTION, SOLUTION INTRAMUSCULAR; INTRAVENOUS; SUBCUTANEOUS
Status: DISCONTINUED | OUTPATIENT
Start: 2024-04-09 | End: 2024-04-09 | Stop reason: HOSPADM

## 2024-04-09 RX ORDER — ONDANSETRON 2 MG/ML
INJECTION INTRAMUSCULAR; INTRAVENOUS PRN
Status: DISCONTINUED | OUTPATIENT
Start: 2024-04-09 | End: 2024-04-09 | Stop reason: SURG

## 2024-04-09 RX ORDER — DIPHENHYDRAMINE HYDROCHLORIDE 50 MG/ML
12.5 INJECTION INTRAMUSCULAR; INTRAVENOUS
Status: DISCONTINUED | OUTPATIENT
Start: 2024-04-09 | End: 2024-04-09 | Stop reason: HOSPADM

## 2024-04-09 RX ORDER — SODIUM CHLORIDE, SODIUM GLUCONATE, SODIUM ACETATE, POTASSIUM CHLORIDE AND MAGNESIUM CHLORIDE 526; 502; 368; 37; 30 MG/100ML; MG/100ML; MG/100ML; MG/100ML; MG/100ML
500 INJECTION, SOLUTION INTRAVENOUS CONTINUOUS
Status: DISCONTINUED | OUTPATIENT
Start: 2024-04-09 | End: 2024-04-09 | Stop reason: HOSPADM

## 2024-04-09 RX ORDER — IPRATROPIUM BROMIDE AND ALBUTEROL SULFATE 2.5; .5 MG/3ML; MG/3ML
3 SOLUTION RESPIRATORY (INHALATION)
Status: DISCONTINUED | OUTPATIENT
Start: 2024-04-09 | End: 2024-04-09 | Stop reason: HOSPADM

## 2024-04-09 RX ORDER — BUPIVACAINE HYDROCHLORIDE 5 MG/ML
INJECTION, SOLUTION EPIDURAL; INTRACAUDAL PRN
Status: DISCONTINUED | OUTPATIENT
Start: 2024-04-09 | End: 2024-04-09 | Stop reason: SURG

## 2024-04-09 RX ORDER — OXYCODONE HYDROCHLORIDE 5 MG/1
5-10 TABLET ORAL EVERY 6 HOURS PRN
Qty: 40 TABLET | Refills: 0 | Status: SHIPPED | OUTPATIENT
Start: 2024-04-09 | End: 2024-04-16

## 2024-04-09 RX ORDER — SODIUM CHLORIDE, SODIUM LACTATE, POTASSIUM CHLORIDE, CALCIUM CHLORIDE 600; 310; 30; 20 MG/100ML; MG/100ML; MG/100ML; MG/100ML
INJECTION, SOLUTION INTRAVENOUS CONTINUOUS
Status: ACTIVE | OUTPATIENT
Start: 2024-04-09 | End: 2024-04-09

## 2024-04-09 RX ORDER — HYDROMORPHONE HYDROCHLORIDE 1 MG/ML
1 INJECTION, SOLUTION INTRAMUSCULAR; INTRAVENOUS; SUBCUTANEOUS
Status: DISCONTINUED | OUTPATIENT
Start: 2024-04-09 | End: 2024-04-09 | Stop reason: HOSPADM

## 2024-04-09 RX ORDER — HYDROMORPHONE HYDROCHLORIDE 1 MG/ML
0.2 INJECTION, SOLUTION INTRAMUSCULAR; INTRAVENOUS; SUBCUTANEOUS
Status: DISCONTINUED | OUTPATIENT
Start: 2024-04-09 | End: 2024-04-09 | Stop reason: HOSPADM

## 2024-04-09 RX ADMIN — LIDOCAINE HYDROCHLORIDE 50 MG: 20 INJECTION, SOLUTION EPIDURAL; INFILTRATION; INTRACAUDAL at 13:25

## 2024-04-09 RX ADMIN — FENTANYL CITRATE 100 MCG: 50 INJECTION, SOLUTION INTRAMUSCULAR; INTRAVENOUS at 14:43

## 2024-04-09 RX ADMIN — LIDOCAINE HYDROCHLORIDE 4 ML: 40 SOLUTION TOPICAL at 13:25

## 2024-04-09 RX ADMIN — SUGAMMADEX 200 MG: 100 INJECTION, SOLUTION INTRAVENOUS at 14:54

## 2024-04-09 RX ADMIN — ONDANSETRON 4 MG: 2 INJECTION INTRAMUSCULAR; INTRAVENOUS at 14:54

## 2024-04-09 RX ADMIN — ROCURONIUM BROMIDE 80 MG: 50 INJECTION, SOLUTION INTRAVENOUS at 13:25

## 2024-04-09 RX ADMIN — SODIUM CHLORIDE, POTASSIUM CHLORIDE, SODIUM LACTATE AND CALCIUM CHLORIDE: 600; 310; 30; 20 INJECTION, SOLUTION INTRAVENOUS at 12:06

## 2024-04-09 RX ADMIN — KETOROLAC TROMETHAMINE 15 MG: 15 INJECTION, SOLUTION INTRAMUSCULAR; INTRAVENOUS at 14:54

## 2024-04-09 RX ADMIN — BUPIVACAINE HYDROCHLORIDE 20 ML: 5 INJECTION, SOLUTION EPIDURAL; INTRACAUDAL at 13:15

## 2024-04-09 RX ADMIN — MIDAZOLAM HYDROCHLORIDE 2 MG: 1 INJECTION, SOLUTION INTRAMUSCULAR; INTRAVENOUS at 13:11

## 2024-04-09 RX ADMIN — CEFAZOLIN 2 G: 1 INJECTION, POWDER, FOR SOLUTION INTRAMUSCULAR; INTRAVENOUS at 13:24

## 2024-04-09 RX ADMIN — DEXAMETHASONE SODIUM PHOSPHATE 8 MG: 4 INJECTION INTRA-ARTICULAR; INTRALESIONAL; INTRAMUSCULAR; INTRAVENOUS; SOFT TISSUE at 13:28

## 2024-04-09 RX ADMIN — OXYCODONE HYDROCHLORIDE 5 MG: 5 SOLUTION ORAL at 15:30

## 2024-04-09 RX ADMIN — EPHEDRINE SULFATE 10 MG: 50 INJECTION, SOLUTION INTRAVENOUS at 13:50

## 2024-04-09 RX ADMIN — PROPOFOL 140 MG: 10 INJECTION, EMULSION INTRAVENOUS at 13:25

## 2024-04-09 RX ADMIN — TRANEXAMIC ACID 1000 MG: 100 INJECTION, SOLUTION INTRAVENOUS at 13:29

## 2024-04-09 RX ADMIN — ONDANSETRON 4 MG: 2 INJECTION INTRAMUSCULAR; INTRAVENOUS at 15:46

## 2024-04-09 RX ADMIN — GLYCOPYRROLATE 0.2 MG: 0.2 INJECTION INTRAMUSCULAR; INTRAVENOUS at 13:50

## 2024-04-09 RX ADMIN — FENTANYL CITRATE 100 MCG: 50 INJECTION, SOLUTION INTRAMUSCULAR; INTRAVENOUS at 13:11

## 2024-04-09 RX ADMIN — DEXMEDETOMIDINE HYDROCHLORIDE 50 MCG: 100 INJECTION, SOLUTION INTRAVENOUS at 13:15

## 2024-04-09 ASSESSMENT — PAIN DESCRIPTION - PAIN TYPE
TYPE: SURGICAL PAIN
TYPE: SURGICAL PAIN

## 2024-04-09 ASSESSMENT — FIBROSIS 4 INDEX: FIB4 SCORE: 0.93

## 2024-04-09 ASSESSMENT — PAIN SCALES - GENERAL: PAIN_LEVEL: 2

## 2024-04-09 NOTE — ANESTHESIA PROCEDURE NOTES
Peripheral Block    Date/Time: 4/9/2024 1:12 PM    Performed by: Mark Sood III, M.D.  Authorized by: Mark Sood III, M.D.    Patient Location:  Pre-op  Start Time:  4/9/2024 1:12 PM  End Time:  4/9/2024 1:15 PM  Reason for Block: at surgeon's request and post-op pain management ONLY    patient identified, IV checked, site marked, risks and benefits discussed, surgical consent, monitors and equipment checked, pre-op evaluation and timeout performed    Patient Position:  Supine  Prep: ChloraPrep    Monitoring:  Heart rate, continuous pulse ox and cardiac monitor  Block Region:  Upper Extremity  Upper Extremity - Block Type:  BRACHIAL PLEXUS block, Interscalene approach    Laterality:  Right  Procedures: ultrasound guided  Image captured, interpreted and electronically stored.  Local Infiltration:  Lidocaine  Strength:  1 %  Dose:  3 ml  Block Type:  Single-shot  Needle Length:  100mm  Needle Gauge:  21 G  Needle Localization:  Ultrasound guidance  Ultrasound picture in chart  Injection Assessment:  Negative aspiration for heme, no paresthesia on injection, incremental injection and local visualized surrounding nerve on ultrasound

## 2024-04-09 NOTE — H&P
Surgery Orthopedic History & Physical Note    Date  4/9/2024    Primary Care Physician  Branden ROJAS M.D.      Pre-Op Diagnosis Codes:     * Traumatic rotator cuff tear, right, initial encounter [S46.011A]     * Biceps tendonitis, right [M75.21]     * Subacromial impingement of right shoulder [M75.41]     * Glenoid labral tear, right, initial encounter [S43.431A]    HPI  This is a 68 y.o. male who presented with right shoulder pain and weakness.  I have seen Yakov in the past for his left shoulder and we performed a left shoulder arthroscopic massive rotator cuff repair, distal clavicle excision, biceps tenotomy, and subacromial decompression.  Yakov did excellent after that surgery, but unfortunately he has had worsening right shoulder pain and weakness.  He points to the anterior, lateral, and superior aspect of the right shoulder is the most severe area of pain.  It is waking him up at night and he has now tried and failed conservative management including rest, ice, activity modification, anti-inflammatories, and a physician directed exercise program.  He recently had an MRI and is here today for further evaluation.     Past Medical History:   Diagnosis Date    Arthritis     L shoulder    Asthma     inhalers as needed    CAD (coronary artery disease)     2 MI in past 15 years ago    Cancer ([facility]) 1988    leukemia    Cataract     Diabetes ([facility])     Type 2- controlled with diet and meds    High cholesterol     Hyperlipidemia     Hypertension     Myocardial infarct ([facility]) 1999,2000    Stroke ([facility]) 1990    no residual       Past Surgical History:   Procedure Laterality Date    PB SHLDR ARTHROSCOP,SURG,W/ROTAT CUFF REPB Left 3/14/2019    Procedure: SHOULDER ARTHROSCOPY W/ ROTATOR CUFF REPAIR;  Surgeon: Anne Mae M.D.;  Location: SURGERY [facility];  Service: Orthopedics    [place] SHLDR ARTHROSCOP,PART ACROMIOPLAS Left 3/14/2019    Procedure: SHOULDER DECOMPRESSION ARTHROSCOPIC - SUBACROMIAL;   Surgeon: Anne Mae M.D.;  Location: SURGERY [facility];  Service: Orthopedics    PB ARTHROSCOPY SHOULDER SURGICAL BICEPS TENODES* Left 3/14/2019    Procedure: SHOULDER ARTHROSCOPY W/ BICIPITAL  - TENOTOMY;  Surgeon: Anne Mae M.D.;  Location: SURGERY [facility];  Service: Orthopedics    CLAVICLE DISTAL EXCISION Left 3/14/2019    Procedure: CLAVICLE DISTAL EXCISION;  Surgeon: Anne Mae M.D.;  Location: SURGERY [facility];  Service: Orthopedics    CATARACT EXTRACTION WITH IOL Bilateral 2005    CYSTOSCOPY      stone retrieval    SHOULDER ARTHROTOMY Right        Current Facility-Administered Medications   Medication Dose Route Frequency Provider Last Rate Last Admin    lactated ringers infusion   Intravenous Continuous Anne Mae M.D. 10 mL/hr at 04/09/24 1206 New Bag at 04/09/24 1206    ceFAZolin (Ancef) injection 2 g  2 g Intravenous Once Anne Mae M.D.           Social History     Socioeconomic History    Marital status:      Spouse name: Not on file    Number of children: Not on file    Years of education: Not on file    Highest education level: Not on file   Occupational History    Not on file   Tobacco Use    Smoking status: Former     Types: Cigars    Smokeless tobacco: Never   Vaping Use    Vaping Use: Never used   Substance and Sexual Activity    Alcohol use: Yes     Alcohol/week: 1.8 - 2.4 oz     Types: 3 - 4 Standard drinks or equivalent per week     Comment: rare    Drug use: Yes     Types: Marijuana     Comment: medical has not used it in months    Sexual activity: Not on file   Other Topics Concern    Not on file   Social History Narrative    Not on file     Social Determinants of Health     Financial Resource Strain: Not on file   Food Insecurity: Not on file   Transportation Needs: Not on file   Physical Activity: Not on file   Stress: Not on file   Social Connections: Not on file   Intimate Partner Violence: Not on file    Housing Stability: Not on file       Family History   Problem Relation Age of Onset    Heart Attack Paternal Grandmother        Allergies  Orange, Codeine, and Cortisone    Review of Systems  Negative except for right shoulder pain    Physical Exam    Vital Signs  Blood Pressure : 110/76   Temperature: 36.3 °C (97.3 °F)   Pulse: 73   Respiration: 20   Pulse Oximetry: 93 %       Labs:  Recent Labs     04/08/24  1051   WBC 8.7   RBC 5.17   HEMOGLOBIN 15.9   HEMATOCRIT 46.5   MCV 89.9   MCH 30.8   MCHC 34.2   RDW 44.7   PLATELETCT 236   MPV 10.6     Recent Labs     04/08/24  1051   SODIUM 137   POTASSIUM 4.5   CHLORIDE 102   CO2 22   GLUCOSE 117*   BUN 16   CREATININE 0.63   CALCIUM 9.8               GENERAL: A+Ox3. INAD. Well appearing and well groomed.  MENTAL STATUS: Pleasant and cooperative. Alert and oriented x3 with normal mood and affect.  Vascular: Pulses are palpable 2+, capillary refills to digits are normal.  Skin: No wounds/lesions/ulcers. Skin warm & dry.  Respiratory: No respiratory distress     MUSCULOSKELETAL:    Right UPPER EXTREMITY:   Normal posture.  Shoulder: No swelling.  Range of Motion: Forward Flexion 160, Abduction 160, ER 45, IR L5; he has a painful arc of motion  Passive is symmetric to active.  Strength: 5-/5 Supraspinatus, 5-/5 External Rotators, 5/5 Subscapularis  Biceps: Tender to palpation, positive Franklin's, positive speeds  Tenderness: Tender over the anterior and lateral shoulder.  Tender over the AC joint  Impingement Signs: Positive Villeda, positive Neer.  Positive belly press, negative empty can  Stability: Normal.  Sensation intact to light touch in the bilateral upper extremities.  2+ radial pulses.     ==========     IMAGING FINDINGS:      I personally reviewed the images independent of the radiology read.     MRI of the right shoulder from [facility] from December 19, 2023 was reviewed by me today.  The MRI shows a very large tear of the entire supraspinatus tendon with  retraction of the tendon to the level of the glenohumeral joint.  There is infraspinatus tendinosis but no evidence of muscle atrophy.  Long head of the biceps appears almost completely torn.  There is acromioclavicular arthrosis, subacromial impingement and bursitis and labral degeneration.     ==========     ASSESSMENT & PLAN: Yakov is a very nice 68-year-old male with right shoulder pain with imaging and exam today with imaging and exam consistent with a diagnosis of right shoulder rotator cuff tear, biceps complete tear versus partial tear, subacromial impingement and bursitis and acromioclavicular arthrosis.     -I discussed these findings at length with Yakov and his partner today.  Given his persistent pain and weakness with the MRI showing a very large rotator cuff tear, at this point I recommend surgery.  -My surgical recommendation is a right shoulder scope with rotator cuff repair and possible patch augmentation, possible biceps tenodesis, labral debridement, subacromial decompression, and distal clavicle excision.        It was explained to the patient that any surgical procedure carries with it the risks of loss of limb or loss of life. Medical complications include but are not limited to death or disability from a heart attack, stroke, GI bleeding, thrombophlebitis and pulmonary embolism, sepsis, adverse reactions (death) due to blood transfusions, allergy or adverse drug reaction. There are other rare, unknown and uncommon systemic conditions that could also adversely affect the systemic outcome. Local complications include but are not limited to wound dehiscence, deep infection, failure of fixation or reconstruction, damage to nerves and vessels which could be temporary or permanent, local recurrence as well as other rare, uncommon and unknown local complications that may necessitate re-operation, more complex orthopaedic reconstructions or amputation. The Patient was informed, questions were answered,  and the consents were signed.  They understood the procedure and despite the risks decided to proceed with surgery.        -Yakov does have a history of coronary artery disease and diabetes, therefore we will get clearance from Dr. Rooney prior to surgery.  -Patient was educated on clinical and imaging findings.  -Patient verbalizes understanding of all discussions today, and all questions were answered satisfactorily.        Juan Edge PA-C

## 2024-04-09 NOTE — OP REPORT
DATE OF SERVICE: April 9, 2024     PREOPERATIVE DIAGNOSES:  1.  Right shoulder rotator cuff tear.  2.  Right shoulder biceps tendonitis.  3.  Right shoulder subacromial impingement      POSTOPERATIVE DIAGNOSES:  1.  Right shoulder rotator cuff tear of the supraspinatus, infraspinatus, and subscapularis.  2.  Right shoulder proximal tear of the long head of the biceps tendon.   3.  Right shoulder subacromial impingement   4.  Right shoulder tearing of the anterior, superior, and inferior labrum, grade II chondromalacia of the glenoid, grade II-III chondromalacia of the humeral head, and subacromial bursitis.    PROCEDURE PERFORMED:  1.  Right shoulder diagnostic arthroscopy.  2.  Right shoulder arthroscopic rotator cuff repair including subscapularis repair.  3.  Right shoulder subacromial decompression.  5.  Right shoulder extensive debridement of the anterior, superior, and inferior labrum, debridement of the biceps tendon stump, glenoid chondroplasty, humeral head chondroplasty, and subacromial bursectomy.     ATTENDING SURGEON:  Anne Mae MD     ASSISTANT: Juan Edge PA-C     ANESTHESIA:  General with an interscalene nerve block.     ANESTHESIOLOGIST: Mark Sood MD     SPECIMENS:  None.     ESTIMATED BLOOD LOSS:  <5 mL     FINDINGS:  There was a large rotator cuff tear of the upper border of the subscapularis, the entire supraspinatus, and anterior edge of the infraspinatus.  There is a complete tear of the proximal end of the long head of the biceps tendon with a large biceps stump.  There is tearing of the anterior, superior, and inferior labrum.  There was a large os acromiale with subacromial impingement and bursitis.  There are areas of grade II chondromalacia on the glenoid and grade II-III chondromalacia on the humeral head.       COMPLICATIONS:  None.     IMPLANTS:    Coldiron 4.75 mm triple loaded alpha vent biocomposite anchor x 3 for subscapularis, supraspinatus, and infraspinatus  repair.     INDICATIONS:  The patient is a very nice 68-year-old male who presented to clinic with worsening right shoulder pain and weakness for the past year or so. Given the patient's continued pain and dysfunction and failure of non operative management, the patient was indicated for surgery.  I had a long discussion with the patient regarding the risks and benefits of surgery including but not limited to bleeding, infection, risk to surrounding structures such as blood vessels and nerves, pain, scar, reoperation, hardware failure, risk with anesthesia such as stroke, heart attack, deep venous thrombosis, pulmonary embolism and death.  The patient understood the risks and benefits and elected to proceed with surgery.     PROCEDURE IN DETAIL:  The patient was met in the preoperative hold area where the correct right upper extremity was marked with my initials. The patient then underwent an interscalene nerve block under ultrasound guidance by the anesthesia team. The patient was transferred to the operating room where he was placed supine on the operating room table with all bony prominences well padded.  The patient received 2 g of preoperative Ancef and 900 mg clindamycin. The patient was intubated by the anesthesia team without complications. The patient was then placed in the beach chair position again with all bony prominences well padded.  Preoperative exam under anesthesia revealed full range of motion of the right shoulder with forward flexion to 180 degrees, abduction to 180 degrees, external rotation to 45 degrees.  The patient's right upper extremity was then prepped and draped in the usual sterile fashion.  A preoperative timeout was held where all those in the room agreed upon the correct patient, the correct site of surgery and the correct surgery to be performed.     I began the procedure by injecting 30 mL of 0.25% Marcaine with epinephrine into the portal sites.  I then used an #11 blade to make  my posterior portal and inserted the scope into the glenohumeral joint.  I then made my anterior portal under direct visualization using a spinal needle.   The patient had a complete tear of the proximal end of the long head of the biceps tendon with a large biceps tendon stump.  I used the 4.0 mm sucker shaver to debride the biceps tendon stump. There was tearing of the labrum anteriorly, superiorly, and inferiorly as well as extensive glenohumeral synovitis.  I then used the 4.0mm sucker shaver to perform an extensive labral debridement and to debride the glenohumeral synovitis.  There were no loose bodies in the axillary recess.  There was a complete tear of the upper border of the subscapularis with retraction to the level of the glenoid.  There were significant adhesions around the subscapularis as well.  I then proceeded with the subscapularis repair by establishing the anterior lateral portal with a spinal needle.  I then used a 70 degree scope and ablator to perform a lysis of adhesions circumferentially around the subscapularis so that I had good excursion of the tendon for the repair.  I then placed one Chrystal 4.75 mm triple loaded alpha vent biocomposite anchor on the lesser tuberosity and had excellent bite.  The sutures were then passed in a simple fashion and this reduced the subscapularis nicely back to the lesser tuberosity.  With the arm slightly externally rotated, I examined the rest of the rotator cuff which showed a full tear of the supraspinatus and anterior edge of the infraspinatus.  The cartilage of the glenoid showed areas of grade II chondromalacia on the anterior inferior aspect of the glenoid.  There were also areas of grade II-III chondromalacia on the humeral head.  A 4.0 mm sucker shaver was used to perform a chondroplasty of both the glenoid and the humeral head to remove loose flaps of cartilage.      The scope was then inserted into the subacromial space and I established my  lateral portal under direct visualization with a spinal needle.  There was thickened and inflamed subacromial bursa, so the 4.0mm sucker shaver and SERFAS ablater were used to perform a subacromial bursectomy.  There was evidence of a large os acromiale as well  as a large anterior osteophyte on the undersurface of the acromion causing impingement.  I then performed a subacromial decompression using the 5.5 mm resector and SERFAS ablater turning his type II acromion into a type I acromion.  Visualizing the rotator cuff from the bursal side again showed a large, full thickness tear of the supraspinatus and anterior to the infraspinatus with about 1 cm of retraction of the tendons.  I then used the 4.0 mm sucker shaver to bur the bone at the greater tuberosity to allow for bleeding after I placed my anchors.  I then placed two MaxVision 4.75 mm triple loaded alpha vent biocomposite suture anchors percutaneously on the greater tuberosity.  The anchors were inserted under direct visualization, so they did not penetrate the humeral head.  I then used a suture passer to pass my sutures from anterior to posterior and these were tied in a simple fashion.  This reduced the tendon back down to the greater tuberosity well with no significant tension on the tendon.  I then used a small microfracture punch to create healing holes just lateral to my repair to aid with rotator cuff healing.  I then copiously irrigated out the shoulder with arthroscopic fluid and the scope was removed from the shoulder.    The wounds were copiously irrigated and a 3-0 Prolene was used to suture the portals and a sterile dressing was applied.  The patient's operative extremity was placed in a shoulder abduction sling and awoken from anesthesia without complications.  Please note that all counts were correct at the end of the case and Juan Edge PA-C, was necessary and critical for all portions of the procedure including retraction, suture  management, visualization, and positioning and without their help, the surgery would not have been as technically successful. The patient was provided with a shoulder immobilizer at the time of service. Any delay would put patient at further risk of injury.        Anne Mae MD

## 2024-04-09 NOTE — DISCHARGE INSTRUCTIONS
What to Expect Post Anesthesia    Rest and take it easy for the first 24 hours.  A responsible adult is recommended to remain with you during that time.  It is normal to feel sleepy.  We encourage you to not do anything that requires balance, judgment or coordination.    FOR 24 HOURS DO NOT:  Drive, operate machinery or run household appliances.  Drink beer or alcoholic beverages.  Make important decisions or sign legal documents.    To avoid nausea, slowly advance diet as tolerated, avoiding spicy or greasy foods for the first day.  Add more substantial food to your diet according to your provider's instructions.  Babies can be fed formula or breast milk as soon as they are hungry.  INCREASE FLUIDS AND FIBER TO AVOID CONSTIPATION.    MILD FLU-LIKE SYMPTOMS ARE NORMAL.  YOU MAY EXPERIENCE GENERALIZED MUSCLE ACHES, THROAT IRRITATION, HEADACHE AND/OR SOME NAUSEA.    If any questions arise, call your provider.  If your provider is not available, please feel free to call the Surgical Center at [phone].    MEDICATIONS: Resume taking daily medication.  Take prescribed pain medication with food.  If no medication is prescribed, you may take non-aspirin pain medication if needed.  PAIN MEDICATION CAN BE VERY CONSTIPATING.  Take a stool softener or laxative such as senokot, pericolace, or milk of magnesia if needed.    Last pain medication given at 03:30 PM, 5 mg Oxycodone.     Diet  Resume pre-operative diet upon discharge from the hospital. Depending on how you are feeling and whether you have nausea or not, you may wish to stay with a bland diet for the first few days. However, you can advance your diet as quickly as you feel ready.    Shoulder Peripheral Nerve Block Discharge Instructions    Shoulder Surgery Side Effects  In addition to numbness and weakness you may experience other symptoms  Other nerves that are close to those nerves injected can also be affected by local anesthesia  You may experience a  "hoarseness in your voice  Your breathing may feel different  You may also notice drooping of your eyelid, pupil constriction, and decreased sweating, on the side of your surgery  All of these side effects are normal and will resolve when the local anesthetic wears off     Prevent Injury  Protect the limb like a baby  Beware of exposing your limb to extreme heat or cold or trauma  The limb may be injured without you noticing because it is numb  Keep the limb elevated whenever possible  Do not sleep on the limb  Change the position of the limb regularly  Avoid putting pressure on your surgical limb    Pain Control  The initial block on the day of surgery will make your extremity feel \"numb\"  Any consecutive injection including prior to discharge from the hospital will make your extremity feel \"numb\"  You may feel an aching or burning when the local anesthesia starts to wear off  Take pain pills as prescribed by your surgeon  Call your surgeon or anesthesiologist if you do not have adequate pain control  "

## 2024-04-09 NOTE — ANESTHESIA POSTPROCEDURE EVALUATION
Patient: Hossein Anderson    Procedure Summary       Date: 04/09/24 Room / Location:  OR 03 / SURGERY [facility]    Anesthesia Start: 1320 Anesthesia Stop: 1509    Procedures:       Right shoulder arthroscopy with rotator cuff repair possible patch augmentation, labral debridement, subacromial decompression, repairs as indicated (Right: Shoulder)      ARTHROSCOPY, SHOULDER, WITH EXTENSIVE DEBRIDEMENT      DECOMPRESSION, SUBACROMIAL SPACE      ARTHROSCOPY, SHOULDER, WITH ROTATOR CUFF REPAIR Diagnosis:       Traumatic rotator cuff tear, right, initial encounter      Biceps tendonitis, right      Subacromial impingement of right shoulder      Glenoid labral tear, right, initial encounter      (Right shoulder rotator cuff tear, Right shoulder tear biceps tendon, Right shoulder subacromial impingement,)    Surgeons: Anne Mae M.D. Responsible Provider: Mark Sood III, M.D.    Anesthesia Type: general, peripheral nerve block ASA Status: 2            Final Anesthesia Type: general, peripheral nerve block  Last vitals  BP   Blood Pressure : 110/72    Temp   35.9 °C (96.6 °F)    Pulse   71   Resp   16    SpO2   95 %      Anesthesia Post Evaluation    Patient location during evaluation: PACU  Patient participation: complete - patient participated  Level of consciousness: awake and alert  Pain score: 2    Airway patency: patent  Anesthetic complications: no  Cardiovascular status: hemodynamically stable  Respiratory status: acceptable  Hydration status: euvolemic    PONV: none          No notable events documented.     Nurse Pain Score: 2 (NPRS)

## 2024-04-09 NOTE — OR NURSING
1518: Report received from Teressa DINERO. Pt resting in [facility]. OPA in place. VSS on 6L. Dressing CDI    1530: OPA removed. C/o 4/10 right shoulder pain. See MAR, PO analgesia given    1537: family updated    1546: c/o nausea, see MAR    1600: pt meets criteria for transfer to stage II. Report called to receiving RN

## 2024-04-09 NOTE — ANESTHESIA TIME REPORT
Anesthesia Start and Stop Event Times       Date Time Event    4/9/2024 1241 Ready for Procedure     1320 Anesthesia Start     1509 Anesthesia Stop          Responsible Staff  04/09/24      Name Role Begin End    Mark Sood III, M.D. Anesth 1320 1509          Overtime Reason:  no overtime (within assigned shift)    Comments:

## 2024-04-09 NOTE — LETTER
April 1, 2024    Patient Name: Hossein Anderson  Surgeon Name: Anne Mae M.D.  Surgery Facility: [facility] ([address] [facility] [place])  Surgery Date: 4/9/2024    The time of your surgery is not final and may change up to and until the day of your surgery. You will be contacted 24-48 hours prior to your surgery date with your check-in and surgery time.    If you will not be at one of the below numbers please call the surgery scheduler at [phone]  Preferred Phone: [phone]    BEFORE YOUR SURGERY   Pre Registration and/or Lab Work must be done within and no earlier than 28 days prior to your surgery date. Your scheduled facility will contact you regarding all required preregistration and/or lab work. If you have not been contacted within 7 days of your scheduled procedure please call [facility] at [phone] for an appointment as soon as possible.      DAY OF YOUR SURGERY  Nothing to eat or drink after midnight     Refrain from smoking any substance after midnight prior to surgery. Smoking may interfere with the anesthetic and frequently produces nausea during the recovery period.    Continue taking all lifesaving medications. Including the morning of your surgery with small sip of water.    Please do NOT take on the day of surgery:  Diuretics: examples- furosemide (Lasix), spironolactone, hydrochlorothiazide  ACE-inhibitors: examples- lisinopril, ramipril, enalapril  “ARBs”: examples- losartan, Olmesartan, valsartan    Please arrive at the hospital/surgery center at the check-in time provided.     An adult will need to bring you and take you home after your surgery.     AFTER YOUR SURGERY  Post op Appointment:   Date: 4.22.24   Time: 10:45 AM   With: Anne Mae MD   Location: [address] [place] [address] [place] [address]    - Therapy- Your first appointment should be 3  day(s) after your surgery. For your convenience we have 4 Physical  Therapy locations: [facility], [place], and [facility]. Call our office ASAP to schedule an appointment at [phone] or take the enclosed Therapy Prescription to a facility of your choice.  - Post Surgery - You will need someone to drive you home  - Post Surgery - You will need someone to stay with you for 24 hours     TIME OFF WORK  FMLA or Disability forms can be faxed directly to: [phone] or you may drop them off at [address] [place] [address]. Our office charges a $35.00 fee per form. Forms will be completed within 10 business days of drop off and payment received. For the status of your forms you may contact our disability office directly [phone].    MEDICATION INSTRUCTIONS **Please read section completely**  The following medications should be stopped a minimum of 10 days prior to surgery:  All over the counter, Supplements & Herbal medications    Anorectics: Phentermine (Adipex-P, Lomaira and Suprenza), Phentermine-topiramate (Qsymia), Bupropion-naltrexone (Contrave)    **If you are on Bupropion for anxiety/depression, please continue this medication up until the day of surgery.     Opiod Partial Agonists/Opioid Antagonists: Buprenorphine (Suboxone, Belbuca, Butrans, Probuphine Implant, Sublocade), Naltrexone (ReVia, Vivitrol), Naloxone    Amphetamines: Dextroamphetamine/Amphetamine (Adderall, Mydayis), Methylphenidate Hydrochloride (Concerta, Metadate, Methylin, Ritalin)    The following medications should be stopped 5 days prior to surgery:  Blood Thinners: Any Aspirin, Aspirin products, anti-inflammatories such as ibuprofen and any blood thinners such as Coumadin and Plavix. Please consult your prescribing physician if you are on life saving blood thinners, in regards to when to stop medications prior to surgery.     The following medications should be stopped a minimum of 3 days prior to surgery:  PDE-5 inhibitors: Sildenafil (Viagra), Tadalafil (Cialis), Vardenafil  (Levitra), Avanafil (Stendra)    MAO Inhibitors: Rasagiline (Azilect), Selegiline (Eldepryl, Emsam, Selapar), Isocarboxazid (Marplan), Phenelzine (Nardil)

## 2024-04-09 NOTE — OR NURSING
Pt allergies and NPO status verified. Home medications reconciled. Belongings secured. Pt verbalizes understanding of pain scale, expected course of stay and plan of care. Surgical site verified with pt. IV access established. All questions answered. Bed in low position. Call light within reach.

## 2024-04-09 NOTE — ANESTHESIA PREPROCEDURE EVALUATION
Case: [id number] Date/Time: 04/09/24 1245    Procedure: Right shoulder arthroscopy with rotator cuff repair possible patch augmentation, possible biceps tenodesis, labral debridement, subacromial decompression, repairs as indicated    Diagnosis:       Traumatic rotator cuff tear, right, initial encounter [S46.011A]      Biceps tendonitis, right [M75.21]      Subacromial impingement of right shoulder [M75.41]      Glenoid labral tear, right, initial encounter [S43.431A]    Pre-op diagnosis:       Traumatic rotator cuff tear, right, initial encounter [S46.011A]      Biceps tendonitis, right [M75.21]      Subacromial impingement of right shoulder [M75.41]      Glenoid labral tear, right, initial encounter [S43.431A]    Location: [place] [address] / SURGERY [facility]    Surgeons: Anne Mae M.D.            Relevant Problems   NEURO   (positive) CVA (cerebral vascular accident) (HCC)      CARDIAC   (positive) Atherosclerosis of aorta (HCC)   (positive) CAD (coronary artery disease)   (positive) Essential hypertension, benign       Physical Exam    Airway   Mallampati: II  TM distance: >3 FB  Neck ROM: full       Cardiovascular - normal exam  Rhythm: regular  Rate: normal  (-) murmur     Dental - normal exam           Pulmonary - normal exam  Breath sounds clear to auscultation     Abdominal    Neurological - normal exam                   Anesthesia Plan    ASA 2       Plan - general and peripheral nerve block     Peripheral nerve block will be post-op pain control  Airway plan will be ETT          Induction: intravenous    Postoperative Plan: Postoperative administration of opioids is intended.    Pertinent diagnostic labs and testing reviewed    Informed Consent:    Anesthetic plan and risks discussed with patient.    Use of blood products discussed with: patient whom consented to blood products.

## 2024-04-09 NOTE — OR NURSING
"1613: Pt arrived to stage 2 and getting dressed with assist of CNA.     1620: Wife at chairside. Pt states he has no pain, denies nausea, LUE \"numb\". RUE immobilizer in place. Radial pulse +3. Patient education completed, family denies further questions.    1630: DC'd to care of family post uneventful stay in PACU 2.    1700:       "

## 2024-04-09 NOTE — OR NURSING
1507: Patient arrived to PACU from OR via [place]. Report received from anesthesia and RN. Respirations are spontaneous and unlabored. VSS on 6L. Dressing is clean, dry and intact. Cold pack applied. RUE elevated. RUE; radial pulse is 2+, cap refill less than 3 seconds, warm pink. OPA in place.     1518: Report to Ryan DINERO.

## 2024-04-09 NOTE — DISCHARGE INSTR - OTHER INFO
Discharge instructions:    General Instructions    You will be in a sling at all times for 6 weeks.  You may remove the sling to shower, do your home exercises, and when you are in physical therapy.    You will sleep in your sling - recliners or propping up with pillows works best    No active motion of the arm    Elevate the operative extremity when you are resting to help minimize the swelling.    Use ice to help control the swelling and pain.  DO NOT USE HEAT - this will increase the swelling.    Call the office if you develop fevers (over 101) or chills.    You should have an office appointment about 7-14 following your discharge from the hospital.  If you do not please call [phone].      Wound Care    You may shower 2 days after surgery if the wound is dry. Keep water exposure to the incision site brief and blot it dry when you get out.  Do not bathe or swim or Jacuzzi (ie. Do not submerge the incision) for approximately 3 to 4 weeks.    Do not use ointments or creams on the incision.      Keep the incision clean and dry.  Any drainage from the incision should be reported to the doctor immediately.      You may notice some bruising around the incision and into the operative extremity.  This is not uncommon and should begin to go away within the first 2 weeks after surgery.    At the time of surgery tape-like strips (Steri-strips) may be placed on your incision to protect it.  These will eventually come off on their own in one to two weeks, or you may remove them yourself after two weeks.    Activity    Estimated return to work varies depending on the demands of your job.  Some ambitious patients return to desk jobs / administrative type work as early as 1 week after surgery (but usually more like 1 month).  For active labor or heavy labor, it may take 3 to 6 months to return to work.     You should do the exercises given to you at discharge until you return for your post-op visit. At that time, you may be  given a new set of exercises.    You cannot drive while in the sling (for the first 6 weeks)      Medications    You were prescribed a short acting, narcotic pain medication.  It is recommended that you begin to wean off of this medication in about 3 days after surgery.  To help wean off of the pain medications or to supplement your pain control you can use Tylenol to help with pain.    You were prescribed an anti-inflammatory medication which you will take for 5 days after surgery.  Take with food if GI discomfort occurs.      You were prescribed an anti-nausea medication that you may take as needed.    You will not be discharged from the hospital with any antibiotics unless there are specific concerns regarding infection.    For constipation (very common with taking narcotics): drink lots of water, add fiber to your diet (prunes, psyllium powder, etc.), use an over-the-counter stool softener (colace), or over-the-counter suppositories if needed

## 2024-04-09 NOTE — ANESTHESIA PROCEDURE NOTES
Airway    Date/Time: 4/9/2024 1:25 PM    Performed by: Mark Sood III, M.D.  Authorized by: Mark Sood III, M.D.    Location:  OR  Urgency:  Elective  Difficult Airway: No    Indications for Airway Management:  Anesthesia      Spontaneous Ventilation: absent    Sedation Level:  Deep  Preoxygenated: Yes    Patient Position:  Sniffing  Final Airway Type:  Endotracheal airway  Final Endotracheal Airway:  ETT  Cuffed: Yes    Technique Used for Successful ETT Placement:  Direct laryngoscopy    Insertion Site:  Oral  Blade Type:  Baird  Laryngoscope Blade/Videolaryngoscope Blade Size:  3  ETT Size (mm):  8.0  Measured from:  Lips  ETT to Lips (cm):  22  Placement Verified by: auscultation and capnometry    Cormack-Lehane Classification:  Grade IIb - view of arytenoids or posterior of glottis only  Number of Attempts at Approach:  1

## 2024-05-13 ENCOUNTER — OFFICE VISIT (OUTPATIENT)
Dept: CARDIOLOGY | Facility: MEDICAL CENTER | Age: 69
End: 2024-05-13
Attending: INTERNAL MEDICINE
Payer: MEDICARE

## 2024-05-13 VITALS
SYSTOLIC BLOOD PRESSURE: 112 MMHG | HEIGHT: 71 IN | RESPIRATION RATE: 14 BRPM | DIASTOLIC BLOOD PRESSURE: 66 MMHG | OXYGEN SATURATION: 95 % | HEART RATE: 70 BPM | BODY MASS INDEX: 29.68 KG/M2 | WEIGHT: 212 LBS

## 2024-05-13 DIAGNOSIS — I25.10 CORONARY ARTERY DISEASE INVOLVING NATIVE CORONARY ARTERY OF NATIVE HEART WITHOUT ANGINA PECTORIS: Chronic | ICD-10-CM

## 2024-05-13 DIAGNOSIS — E78.5 DYSLIPIDEMIA: ICD-10-CM

## 2024-05-13 DIAGNOSIS — I10 ESSENTIAL HYPERTENSION, BENIGN: ICD-10-CM

## 2024-05-13 PROCEDURE — 3074F SYST BP LT 130 MM HG: CPT | Performed by: INTERNAL MEDICINE

## 2024-05-13 PROCEDURE — 99214 OFFICE O/P EST MOD 30 MIN: CPT | Performed by: INTERNAL MEDICINE

## 2024-05-13 PROCEDURE — 3078F DIAST BP <80 MM HG: CPT | Performed by: INTERNAL MEDICINE

## 2024-05-13 ASSESSMENT — ENCOUNTER SYMPTOMS
DIZZINESS: 0
CONSTITUTIONAL NEGATIVE: 1
CHILLS: 0
RESPIRATORY NEGATIVE: 1
WEIGHT LOSS: 0
DEPRESSION: 0
HEADACHES: 0
COUGH: 0
DOUBLE VISION: 0
BLURRED VISION: 0
MYALGIAS: 0
NEUROLOGICAL NEGATIVE: 1
WEAKNESS: 0
NAUSEA: 0
CLAUDICATION: 0
EYES NEGATIVE: 1
SHORTNESS OF BREATH: 0
PSYCHIATRIC NEGATIVE: 1
BRUISES/BLEEDS EASILY: 0
ABDOMINAL PAIN: 0
PALPITATIONS: 0
FOCAL WEAKNESS: 0
VOMITING: 0
NERVOUS/ANXIOUS: 0
FEVER: 0
GASTROINTESTINAL NEGATIVE: 1
CARDIOVASCULAR NEGATIVE: 1

## 2024-05-13 ASSESSMENT — FIBROSIS 4 INDEX: FIB4 SCORE: 0.93

## 2024-05-13 NOTE — PROGRESS NOTES
Chief Complaint   Patient presents with    Follow-Up     F/V Dx: CVA (cerebral vascular accident) ([facility])       Hypertension     F/V Dx: Essential hypertension, benign          Subjective     Yakov Anderson is a 68 y.o. male who presents today for follow up of coronary artery disease.    Since the patient's last visit on 01/29/24, he has been doing well clinically. He denies chest pain, shortness of breath, palpitations, nausea/vomiting or diaphoresis. On the last visit he was started on metoprolol and is tolerating this medication well without side effects. He underwent successful right shoulder surgery which he is recovering from. He has not started walking yet.    Past Medical History:   Diagnosis Date    Arthritis     L shoulder    Asthma     inhalers as needed    CAD (coronary artery disease)     2 MI in past 15 years ago    Cancer ([facility]) 1988    leukemia    Cataract     Diabetes ([facility])     Type 2- controlled with diet and meds    High cholesterol     Hyperlipidemia     Hypertension     Myocardial infarct ([facility]) 1999,2000    Stroke ([facility]) 1990    no residual     Past Surgical History:   Procedure Laterality Date    [place] SHLDR ARTHROSCOP EXTEN DEBRIDE 3+ Right 4/9/2024    Procedure: ARTHROSCOPY, SHOULDER, WITH EXTENSIVE DEBRIDEMENT;  Surgeon: Anne Mae M.D.;  Location: [facility];  Service: Orthopedics    [facility]ROT[facility] CUFF REPB Right 4/9/2024    Procedure: ARTHROSCOPY, SHOULDER, WITH ROTATOR CUFF REPAIR;  Surgeon: Anne Mae M.D.;  Location: [facility];  Service: Orthopedics    SHOULDER DECOMPRESSION Right 4/9/2024    Procedure: DECOMPRESSION, SUBACROMIAL SPACE;  Surgeon: Anne Mae M.D.;  Location: [facility];  Service: Orthopedics    [facility],W/ROTAT CUFF REPB Left 3/14/2019    Procedure: SHOULDER ARTHROSCOPY W/ ROTATOR CUFF REPAIR;  Surgeon: Anne Mae M.D.;  Location: [facility];  Service:  "Orthopedics    LA SHLDR ARTHROSCOP,PART ACROMIOPLAS Left 3/14/2019    Procedure: SHOULDER DECOMPRESSION ARTHROSCOPIC - SUBACROMIAL;  Surgeon: Anne Mae M.D.;  Location: SURGERY [facility];  Service: Orthopedics    PB ARTHROSCOPY SHOULDER SURGICAL BICEPS TENODES* Left 3/14/2019    Procedure: SHOULDER ARTHROSCOPY W/ BICIPITAL  - TENOTOMY;  Surgeon: Anne Mae M.D.;  Location: SURGERY [facility];  Service: Orthopedics    CLAVICLE DISTAL EXCISION Left 3/14/2019    Procedure: CLAVICLE DISTAL EXCISION;  Surgeon: Anne Mae M.D.;  Location: SURGERY [facility];  Service: Orthopedics    CATARACT EXTRACTION WITH IOL Bilateral 2005    CYSTOSCOPY      stone retrieval    SHOULDER ARTHROTOMY Right      Family History   Problem Relation Age of Onset    Heart Attack Paternal Grandmother      Social History     Socioeconomic History    Marital status:      Spouse name: Not on file    Number of children: Not on file    Years of education: Not on file    Highest education level: Not on file   Occupational History    Not on file   Tobacco Use    Smoking status: Former     Types: Cigars    Smokeless tobacco: Never   Vaping Use    Vaping Use: Never used   Substance and Sexual Activity    Alcohol use: Yes     Alcohol/week: 1.8 - 2.4 oz     Types: 3 - 4 Standard drinks or equivalent per week     Comment: rare    Drug use: Yes     Types: Marijuana     Comment: medical has not used it in months    Sexual activity: Not on file   Other Topics Concern    Not on file   Social History Narrative    Not on file     Social Determinants of Health     Financial Resource Strain: Not on file   Food Insecurity: Not on file   Transportation Needs: Not on file   Physical Activity: Not on file   Stress: Not on file   Social Connections: Not on file   Intimate Partner Violence: Not on file   Housing Stability: Not on file     Allergies   Allergen Reactions    [place] Swelling     Pt states \"My mouth " "swells up\".      Codeine Unspecified     Pt states he tolerates morphine 1/2017  Tachycardia    Cortisone      crystalizes when injected     (Medications reviewed.)  Outpatient Encounter Medications as of 5/13/2024   Medication Sig Dispense Refill    Naproxen Sodium (ALEVE PO) Take  by mouth.      ondansetron (ZOFRAN) 4 MG Tab tablet Take 1 Tablet by mouth every four hours as needed for Nausea/Vomiting. 20 Tablet 0    NON SPECIFIED Take 1 Tablet by mouth every day. Super Beet- heart health supplement      NON SPECIFIED Take 1 Tablet by mouth every day. Brain health supplement      multivitamin Tab Take 1 Tablet by mouth every day.      metoprolol SR (TOPROL XL) 25 MG TABLET SR 24 HR Take 1 Tablet by mouth every day. (Patient taking differently: Take 25 mg by mouth every evening.) 90 Tablet 3    losartan (COZAAR) 25 MG Tab Take 1 Tablet by mouth every day. (Patient taking differently: Take 25 mg by mouth every evening.) 90 Tablet 3    montelukast (SINGULAIR) 10 MG Tab Take 10 mg by mouth every day.      OZEMPIC, 0.25 OR 0.5 MG/DOSE, 2 MG/3ML Solution Pen-injector INJECT 0.5MG UNDER THE SKIN ONCE WEEKLY      albuterol 108 (90 Base) MCG/ACT Aero Soln inhalation aerosol Inhale 2 Puffs every 6 hours as needed for Shortness of Breath. 8.5 g 0    atorvastatin (LIPITOR) 20 MG Tab Take 20 mg by mouth at bedtime.      metformin (GLUCOPHAGE) 1000 MG tablet Take 1,000 mg by mouth 2 times a day, with meals.      tamsulosin (FLOMAX) 0.4 MG capsule Take 0.4 mg by mouth every bedtime.      aspirin EC (ECOTRIN) 81 MG Tablet Delayed Response Take 81 mg by mouth every bedtime.      Glucos-Chondroit-Hyaluron-MSM (GLUCOSAMINE CHONDROITIN JOINT PO) Take 1 Tab by mouth every day.      [DISCONTINUED] nitroglycerin (NITROSTAT) 0.4 MG SL Tab Place 1 Tab under tongue as needed for Chest Pain. 25 Tab 0    [DISCONTINUED] calcium carbonate (TUMS) 500 MG Chew Tab Take 500 mg by mouth as needed (For upset stomach).       No facility-administered " "encounter medications on file as of 5/13/2024.     Review of Systems   Constitutional: Negative.  Negative for chills, fever, malaise/fatigue and weight loss.   HENT: Negative.  Negative for hearing loss.    Eyes: Negative.  Negative for blurred vision and double vision.   Respiratory: Negative.  Negative for cough and shortness of breath.    Cardiovascular: Negative.  Negative for chest pain, palpitations, claudication and leg swelling.   Gastrointestinal: Negative.  Negative for abdominal pain, nausea and vomiting.   Genitourinary: Negative.  Negative for dysuria and urgency.   Musculoskeletal:  Positive for joint pain. Negative for myalgias.   Skin: Negative.  Negative for itching and rash.   Neurological: Negative.  Negative for dizziness, focal weakness, weakness and headaches.   Endo/Heme/Allergies: Negative.  Does not bruise/bleed easily.   Psychiatric/Behavioral: Negative.  Negative for depression. The patient is not nervous/anxious.               Objective     /66 (BP Location: Left arm, Patient Position: Sitting, BP Cuff Size: Adult)   Pulse 70   Resp 14   Ht 1.803 m (5' 11\")   Wt 96.2 kg (212 lb)   SpO2 95%   BMI 29.57 kg/m²     Physical Exam  Constitutional:       Appearance: Normal appearance. He is well-developed and normal weight.   HENT:      Head: Normocephalic and atraumatic.   Neck:      Vascular: No JVD.   Cardiovascular:      Rate and Rhythm: Normal rate and regular rhythm.      Heart sounds: Normal heart sounds.   Pulmonary:      Effort: Pulmonary effort is normal.      Breath sounds: Normal breath sounds.   Abdominal:      General: Bowel sounds are normal.      Palpations: Abdomen is soft.      Comments: No hepatosplenomegaly.   Musculoskeletal:         General: Normal range of motion.   Lymphadenopathy:      Cervical: No cervical adenopathy.   Skin:     General: Skin is warm and dry.   Neurological:      Mental Status: He is alert and oriented to person, place, and time.          "   CARDIAC STUDIES/PROCEDURES:     CT OF ABDOMEN (08/31/22)  Aortic atherosclerosis without aneurysm.     CT OF CHEST (02/18/17)  There is mild calcific atherosclerotic plaque.   (study result reviewed)    ECHOCARDIOGRAM CONCLUSIONS (03/11/24)  Prior study on 03/29/2017, compared to the report of the prior study,   there has been no significant change.   Normal left ventricular systolic function.  The left ventricular ejection fraction is visually estimated to be 55%.  No significant valvular abnormalities.   (study result reviewed)     ECHOCARDIOGRAM CONCLUSIONS (03/29/17)  No prior study is available for comparison.   Normal left ventricular systolic function.  Left ventricular ejection fraction is visually estimated to be 65%.  Grade I diastolic dysfunction.  No significant valve abnormalities.   Estimated right ventricular systolic pressure is 20 mmHg.    EKG performed on (01/04/24) EKG shows sinus rhythm with non-specific ST segment abnormalities.      Laboratory results of (07/26/23) Cholesterol profile of 157/101/44/93 mg/dL noted.     MYOCARDIAL PERFUSION STUDY CONCLUSIONS (02/01/24)  NUCLEAR IMAGING INTERPRETATION  Normal exercise myocardial perfusion study.  No evidence of ischemia or infarct.  SDS 1.  LVEF 55%.  Negative ETT.  Fair to good exercise tolerance.  Sanya 07:53, 8.2 METs.   Occasional PVCs.  No chest pain.  ECG INTERPRETATION  Negative stress ECG for ischemia.  (study result reviewed)      MYOCARDIAL PERFUSION STUDY CONCLUSIONS (01/31/17)  NUCLEAR IMAGING INTERPRETATION  No evidence of significant jeopardized viable myocardium or prior myocardial infarction.  Normal left ventricular size, ejection fraction, and wall motion.  ECG INTERPRETATION  Negative stress ECG for ischemia.    Assessment & Plan     1. Coronary artery disease involving native coronary artery of native heart without angina pectoris        2. Essential hypertension, benign        3. Dyslipidemia            Medical Decision  Making: Today's Assessment/Status/Plan:        Chest pain: His chest pain has resolved and his cardiac studies were unremarkable. No further studies recommended at this time.  Coronary artery disease (x 2 cardiac events per patient in 1999 and 2000): He remains clinically doing well. I will continue with current medical care including metoprolol, losartan, atorvastatin.  Hypertension: Blood pressure is well controlled. We will continue with beta blockade therapy.  Hyperlipidemia: He is doing well on statin therapy without myalgia symptoms. (Managed by primary care physician)   Atherosclerosis of the aorta     We will follow up the patient in one year.    CC Branden Rosas

## 2024-08-28 ENCOUNTER — HOSPITAL ENCOUNTER (OUTPATIENT)
Dept: LAB | Facility: MEDICAL CENTER | Age: 69
End: 2024-08-28
Attending: FAMILY MEDICINE
Payer: MEDICARE

## 2024-08-28 LAB
BASOPHILS # BLD AUTO: 1 % (ref 0–1.8)
BASOPHILS # BLD: 0.09 K/UL (ref 0–0.12)
EOSINOPHIL # BLD AUTO: 0.12 K/UL (ref 0–0.51)
EOSINOPHIL NFR BLD: 1.3 % (ref 0–6.9)
ERYTHROCYTE [DISTWIDTH] IN BLOOD BY AUTOMATED COUNT: 51.8 FL (ref 35.9–50)
EST. AVERAGE GLUCOSE BLD GHB EST-MCNC: 154 MG/DL
HBA1C MFR BLD: 7 % (ref 4–5.6)
HCT VFR BLD AUTO: 38.4 % (ref 42–52)
HGB BLD-MCNC: 12.6 G/DL (ref 14–18)
IMM GRANULOCYTES # BLD AUTO: 0.05 K/UL (ref 0–0.11)
IMM GRANULOCYTES NFR BLD AUTO: 0.6 % (ref 0–0.9)
LYMPHOCYTES # BLD AUTO: 2.7 K/UL (ref 1–4.8)
LYMPHOCYTES NFR BLD: 30.3 % (ref 22–41)
MCH RBC QN AUTO: 27.8 PG (ref 27–33)
MCHC RBC AUTO-ENTMCNC: 32.8 G/DL (ref 32.3–36.5)
MCV RBC AUTO: 84.6 FL (ref 81.4–97.8)
MONOCYTES # BLD AUTO: 0.88 K/UL (ref 0–0.85)
MONOCYTES NFR BLD AUTO: 9.9 % (ref 0–13.4)
NEUTROPHILS # BLD AUTO: 5.08 K/UL (ref 1.82–7.42)
NEUTROPHILS NFR BLD: 56.9 % (ref 44–72)
NRBC # BLD AUTO: 0 K/UL
NRBC BLD-RTO: 0 /100 WBC (ref 0–0.2)
PLATELET # BLD AUTO: 317 K/UL (ref 164–446)
PMV BLD AUTO: 10.1 FL (ref 9–12.9)
RBC # BLD AUTO: 4.54 M/UL (ref 4.7–6.1)
WBC # BLD AUTO: 8.9 K/UL (ref 4.8–10.8)

## 2024-08-28 PROCEDURE — 80053 COMPREHEN METABOLIC PANEL: CPT

## 2024-08-28 PROCEDURE — 83036 HEMOGLOBIN GLYCOSYLATED A1C: CPT | Mod: GA

## 2024-08-28 PROCEDURE — 82570 ASSAY OF URINE CREATININE: CPT

## 2024-08-28 PROCEDURE — 36415 COLL VENOUS BLD VENIPUNCTURE: CPT | Mod: GA

## 2024-08-28 PROCEDURE — 80061 LIPID PANEL: CPT

## 2024-08-28 PROCEDURE — 82043 UR ALBUMIN QUANTITATIVE: CPT

## 2024-08-28 PROCEDURE — 85025 COMPLETE CBC W/AUTO DIFF WBC: CPT

## 2024-08-29 LAB
ALBUMIN SERPL BCP-MCNC: 4.1 G/DL (ref 3.2–4.9)
ALBUMIN/GLOB SERPL: 1.2 G/DL
ALP SERPL-CCNC: 76 U/L (ref 30–99)
ALT SERPL-CCNC: 11 U/L (ref 2–50)
ANION GAP SERPL CALC-SCNC: 15 MMOL/L (ref 7–16)
AST SERPL-CCNC: 20 U/L (ref 12–45)
BILIRUB SERPL-MCNC: 0.4 MG/DL (ref 0.1–1.5)
BUN SERPL-MCNC: 17 MG/DL (ref 8–22)
CALCIUM ALBUM COR SERPL-MCNC: 9.3 MG/DL (ref 8.5–10.5)
CALCIUM SERPL-MCNC: 9.4 MG/DL (ref 8.5–10.5)
CHLORIDE SERPL-SCNC: 102 MMOL/L (ref 96–112)
CHOLEST SERPL-MCNC: 119 MG/DL (ref 100–199)
CO2 SERPL-SCNC: 21 MMOL/L (ref 20–33)
COLLECT DURATION TIME SPEC: NORMAL HRS
CREAT SERPL-MCNC: 0.6 MG/DL (ref 0.5–1.4)
GFR SERPLBLD CREATININE-BSD FMLA CKD-EPI: 106 ML/MIN/1.73 M 2
GLOBULIN SER CALC-MCNC: 3.3 G/DL (ref 1.9–3.5)
GLUCOSE SERPL-MCNC: 164 MG/DL (ref 65–99)
HDLC SERPL-MCNC: 39 MG/DL
LDLC SERPL CALC-MCNC: 54 MG/DL
POTASSIUM SERPL-SCNC: 4.1 MMOL/L (ref 3.6–5.5)
PROT SERPL-MCNC: 7.4 G/DL (ref 6–8.2)
SODIUM SERPL-SCNC: 138 MMOL/L (ref 135–145)
SPECIMEN VOL ?TM UR: NORMAL ML
TRIGL SERPL-MCNC: 129 MG/DL (ref 0–149)

## 2024-09-01 LAB
COLLECT DURATION TIME SPEC: NORMAL HR
CREAT 24H UR-MCNC: 235 MG/DL
MICROALBUMIN 24H UR-MCNC: 1.5 MG/DL
MICROALBUMIN/CREAT 24H UR: 6 MG/G (ref 0–30)
SPECIMEN VOL ?TM UR: NORMAL ML

## 2024-11-30 ENCOUNTER — OFFICE VISIT (OUTPATIENT)
Dept: URGENT CARE | Facility: PHYSICIAN GROUP | Age: 69
End: 2024-11-30
Payer: MEDICARE

## 2024-11-30 VITALS
WEIGHT: 212 LBS | HEIGHT: 71 IN | SYSTOLIC BLOOD PRESSURE: 132 MMHG | OXYGEN SATURATION: 98 % | TEMPERATURE: 98 F | HEART RATE: 69 BPM | RESPIRATION RATE: 20 BRPM | DIASTOLIC BLOOD PRESSURE: 70 MMHG | BODY MASS INDEX: 29.68 KG/M2

## 2024-11-30 DIAGNOSIS — H93.12 TINNITUS OF LEFT EAR: ICD-10-CM

## 2024-11-30 DIAGNOSIS — L70.0 CLOSED COMEDONE: ICD-10-CM

## 2024-11-30 PROCEDURE — 3078F DIAST BP <80 MM HG: CPT | Performed by: NURSE PRACTITIONER

## 2024-11-30 PROCEDURE — 3075F SYST BP GE 130 - 139MM HG: CPT | Performed by: NURSE PRACTITIONER

## 2024-11-30 PROCEDURE — 99213 OFFICE O/P EST LOW 20 MIN: CPT | Performed by: NURSE PRACTITIONER

## 2024-11-30 ASSESSMENT — FIBROSIS 4 INDEX: FIB4 SCORE: 1.27

## 2024-11-30 NOTE — PROGRESS NOTES
"Subjective:     Hossein Anderson is a 69 y.o. male who presents for Ringing in Ear (Left ear is ringing x 4 days pt sts his wife looked in ear and saw a pimple or bump )      HPI  Pt presents for evaluation of a new problem ***    ROS    PMH:   Past Medical History:   Diagnosis Date   • Arthritis     L shoulder   • Asthma     inhalers as needed   • CAD (coronary artery disease)     2 MI in past 15 years ago   • Cancer ([facility]) 1988    leukemia   • Cataract    • Diabetes ([facility])     Type 2- controlled with diet and meds   • High cholesterol    • Hyperlipidemia    • Hypertension    • Myocardial infarct ([facility]) 1999,2000   • Stroke ([facility]) 1990    no residual     ALLERGIES:   Allergies   Allergen Reactions   • [place] Swelling     Pt states \"My mouth swells up\".     • Codeine Unspecified     Pt states he tolerates morphine 1/2017  Tachycardia   • Cortisone      crystalizes when injected     SURGHX:   Past Surgical History:   Procedure Laterality Date   • GA SHLDR ARTHROSCOP EXTEN DEBRIDE 3+ Right 4/9/2024    Procedure: ARTHROSCOPY, SHOULDER, WITH EXTENSIVE DEBRIDEMENT;  Surgeon: Anne Mae M.D.;  Location: [facility];  Service: Orthopedics   • PB SHLDR ARTHROSCOP,SURG,W/ROTAT CUFF REPB Right 4/9/2024    Procedure: ARTHROSCOPY, SHOULDER, WITH ROTATOR CUFF REPAIR;  Surgeon: Anne Mae M.D.;  Location: [facility];  Service: Orthopedics   • SHOULDER DECOMPRESSION Right 4/9/2024    Procedure: DECOMPRESSION, SUBACROMIAL SPACE;  Surgeon: Anne Mae M.D.;  Location: [facility];  Service: Orthopedics   • PB SHLDR ARTHROSCOP,SURG,W/ROTAT CUFF REPB Left 3/14/2019    Procedure: SHOULDER ARTHROSCOPY W/ ROTATOR CUFF REPAIR;  Surgeon: Anne Mae M.D.;  Location: [facility];  Service: Orthopedics   • GA SHLDR ARTHROSCOP,PART ACROMIOPLAS Left 3/14/2019    Procedure: SHOULDER DECOMPRESSION ARTHROSCOPIC - SUBACROMIAL;  Surgeon: Anne Mae M.D.;  " "Location: SURGERY [facility];  Service: Orthopedics   • PB ARTHROSCOPY SHOULDER SURGICAL BICEPS TENODES* Left 3/14/2019    Procedure: SHOULDER ARTHROSCOPY W/ BICIPITAL  - TENOTOMY;  Surgeon: Anne Mae M.D.;  Location: SURGERY [facility];  Service: Orthopedics   • CLAVICLE DISTAL EXCISION Left 3/14/2019    Procedure: CLAVICLE DISTAL EXCISION;  Surgeon: Anne Mae M.D.;  Location: SURGERY [facility];  Service: Orthopedics   • CATARACT EXTRACTION WITH IOL Bilateral 2005   • CYSTOSCOPY      stone retrieval   • SHOULDER ARTHROTOMY Right      SOCHX:   Social History     Socioeconomic History   • Marital status:    Tobacco Use   • Smoking status: Former     Types: Cigars   • Smokeless tobacco: Never   Vaping Use   • Vaping status: Never Used   Substance and Sexual Activity   • Alcohol use: Yes     Alcohol/week: 1.8 - 2.4 oz     Types: 3 - 4 Standard drinks or equivalent per week     Comment: rare   • Drug use: Yes     Types: Marijuana     Comment: medical has not used it in months     FH:   Family History   Problem Relation Age of Onset   • Heart Attack Paternal Grandmother          Objective:   /70   Pulse 69   Temp 36.7 °C (98 °F) (Temporal)   Resp 20   Ht 1.803 m (5' 11\")   Wt 96.2 kg (212 lb)   SpO2 98%   BMI 29.57 kg/m²     Physical Exam    Assessment/Plan:   Assessment      AVS handout given and reviewed with patient. Pt educated on red flags and when to seek treatment back in ER or UC.     1. Tinnitus of left ear  - Referral to Audiology    " weight. He is not ill-appearing or toxic-appearing.   HENT:      Head: Normocephalic.      Right Ear: Tympanic membrane, ear canal and external ear normal.      Left Ear: Tympanic membrane, ear canal and external ear normal.      Ears:      Comments: Pinpoint closed comedone present to outer left ear.  Area was cleansed with alcohol swab and removed with gentle pressure using 2 Q-tip heads.  Patient tolerated this well.  Small amount of drainage removed.  Polysporin applied     Nose: Nose normal.      Mouth/Throat:      Mouth: Mucous membranes are moist.   Eyes:      General:         Right eye: No discharge.         Left eye: No discharge.      Extraocular Movements: Extraocular movements intact.      Conjunctiva/sclera: Conjunctivae normal.      Pupils: Pupils are equal, round, and reactive to light.   Pulmonary:      Effort: Pulmonary effort is normal.      Breath sounds: Normal breath sounds.   Abdominal:      General: Abdomen is flat.   Musculoskeletal:         General: Normal range of motion.      Cervical back: Normal range of motion and neck supple. No rigidity.   Lymphadenopathy:      Cervical: No cervical adenopathy.   Skin:     General: Skin is warm and dry.   Neurological:      General: No focal deficit present.      Mental Status: He is alert and oriented to person, place, and time. Mental status is at baseline.   Psychiatric:         Mood and Affect: Mood normal.         Behavior: Behavior normal.         Judgment: Judgment normal.         Assessment/Plan:   Assessment    1. Tinnitus of left ear  Referral to Audiology      2. Closed comedone          We did discuss differentials for tinnitus.  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  Patient states that he always wore hearing protection however, patient does seem to be suffering from hearing loss.  We did discuss supportive measures including wearing hearing bud with sound for  distraction.  Patient is in agreement to plan of care.

## 2025-01-22 DIAGNOSIS — I10 ESSENTIAL HYPERTENSION, BENIGN: ICD-10-CM

## 2025-01-22 RX ORDER — METOPROLOL SUCCINATE 25 MG/1
25 TABLET, EXTENDED RELEASE ORAL DAILY
Qty: 90 TABLET | Refills: 1 | Status: SHIPPED | OUTPATIENT
Start: 2025-01-22

## 2025-01-22 RX ORDER — LOSARTAN POTASSIUM 25 MG/1
25 TABLET ORAL DAILY
Qty: 90 TABLET | Refills: 1 | Status: SHIPPED | OUTPATIENT
Start: 2025-01-22

## 2025-01-22 NOTE — TELEPHONE ENCOUNTER
Is the patient due for a refill? Yes    Was the patient seen the last 15 months? Yes    Date of last office visit: 05.13.2024    Does the patient have an upcoming appointment?  No    Provider to refill:JAISON    Does the patient have skilled nursing Plus and need 100-day supply? (This applies to ALL medications) Patient does not have SCP

## 2025-04-23 ENCOUNTER — OFFICE VISIT (OUTPATIENT)
Dept: URGENT CARE | Facility: PHYSICIAN GROUP | Age: 70
End: 2025-04-23
Payer: MEDICARE

## 2025-04-23 VITALS
DIASTOLIC BLOOD PRESSURE: 68 MMHG | HEART RATE: 85 BPM | SYSTOLIC BLOOD PRESSURE: 128 MMHG | HEIGHT: 71 IN | TEMPERATURE: 97.3 F | OXYGEN SATURATION: 98 % | WEIGHT: 220 LBS | RESPIRATION RATE: 20 BRPM | BODY MASS INDEX: 30.8 KG/M2

## 2025-04-23 DIAGNOSIS — M79.671 ACUTE PAIN OF RIGHT FOOT: ICD-10-CM

## 2025-04-23 PROCEDURE — 99213 OFFICE O/P EST LOW 20 MIN: CPT | Performed by: NURSE PRACTITIONER

## 2025-04-23 PROCEDURE — 3078F DIAST BP <80 MM HG: CPT | Performed by: NURSE PRACTITIONER

## 2025-04-23 PROCEDURE — 3074F SYST BP LT 130 MM HG: CPT | Performed by: NURSE PRACTITIONER

## 2025-04-23 RX ORDER — OMEPRAZOLE 20 MG/1
CAPSULE, DELAYED RELEASE ORAL
COMMUNITY
Start: 2025-04-17

## 2025-04-23 RX ORDER — BLOOD-GLUCOSE METER
KIT MISCELLANEOUS
COMMUNITY
Start: 2025-03-19

## 2025-04-23 ASSESSMENT — FIBROSIS 4 INDEX: FIB4 SCORE: 1.27

## 2025-04-23 NOTE — PROGRESS NOTES
"Subjective:     Hossein Anderson is a 69 y.o. male who presents for Foot Pain (Right foot, pain feels like a nail in the foot. Dx diabetic. Pain started 2 am has a red luis miguel on bottom of foot.)      HPI  Pt presents for evaluation of a new problem.  Yakov is a very pleasant 69-year-old male who presents to urgent care today with complaints of right-sided foot pain that began last night at 2:00 in the morning.  Patient notes that he stepped down and developed severe stabbing pain in the bottom of his right foot.  He notes his wife applied bag balm as there was a red luis miguel on the bottom of his foot.  He awoke this morning with significant reduction in pain.  There has been no drainage or bleeding.  Patient is a diabetic and notes his blood sugars have recently been in the 160 range.    ROS    PMH:   Past Medical History:   Diagnosis Date    Arthritis     L shoulder    Asthma     inhalers as needed    CAD (coronary artery disease)     2 MI in past 15 years ago    Cancer ([facility]) 1988    leukemia    Cataract     Diabetes ([facility])     Type 2- controlled with diet and meds    High cholesterol     Hyperlipidemia     Hypertension     Myocardial infarct ([facility]) 1999,2000    Stroke ([facility]) 1990    no residual     ALLERGIES:   Allergies   Allergen Reactions    [place] Swelling     Pt states \"My mouth swells up\".      Codeine Unspecified     Pt states he tolerates morphine 1/2017  Tachycardia    Cortisone      crystalizes when injected     SURGHX:   Past Surgical History:   Procedure Laterality Date    [place] SHLDR ARTHROSCOP EXTEN DEBRIDE 3+ Right 4/9/2024    Procedure: ARTHROSCOPY, SHOULDER, WITH EXTENSIVE DEBRIDEMENT;  Surgeon: Anne Mae M.D.;  Location: SURGERY [facility];  Service: Orthopedics    PB SHLDR ARTHROSCOP,SURG,W/ROTAT CUFF REPB Right 4/9/2024    Procedure: ARTHROSCOPY, SHOULDER, WITH ROTATOR CUFF REPAIR;  Surgeon: Anne Mae M.D.;  Location: SURGERY [facility];  Service: Orthopedics    SHOULDER " "DECOMPRESSION Right 4/9/2024    Procedure: DECOMPRESSION, SUBACROMIAL SPACE;  Surgeon: Anne Mae M.D.;  Location: SURGERY [facility];  Service: Orthopedics    PB SHLDR ARTHROSCOP,SURG,W/ROTAT CUFF REPB Left 3/14/2019    Procedure: SHOULDER ARTHROSCOPY W/ ROTATOR CUFF REPAIR;  Surgeon: Anne Mae M.D.;  Location: SURGERY [facility];  Service: Orthopedics    MO SHLDR ARTHROSCOP,PART ACROMIOPLAS Left 3/14/2019    Procedure: SHOULDER DECOMPRESSION ARTHROSCOPIC - SUBACROMIAL;  Surgeon: Anne Mae M.D.;  Location: SURGERY [facility];  Service: Orthopedics    PB ARTHROSCOPY SHOULDER SURGICAL BICEPS TENODES* Left 3/14/2019    Procedure: SHOULDER ARTHROSCOPY W/ BICIPITAL  - TENOTOMY;  Surgeon: Anne Mae M.D.;  Location: SURGERY [facility];  Service: Orthopedics    CLAVICLE DISTAL EXCISION Left 3/14/2019    Procedure: CLAVICLE DISTAL EXCISION;  Surgeon: Anne Mae M.D.;  Location: SURGERY [facility];  Service: Orthopedics    CATARACT EXTRACTION WITH IOL Bilateral 2005    CYSTOSCOPY      stone retrieval    SHOULDER ARTHROTOMY Right      SOCHX:   Social History     Socioeconomic History    Marital status:    Tobacco Use    Smoking status: Former     Types: Cigars    Smokeless tobacco: Never   Vaping Use    Vaping status: Never Used   Substance and Sexual Activity    Alcohol use: Yes     Alcohol/week: 1.8 - 2.4 oz     Types: 3 - 4 Standard drinks or equivalent per week     Comment: rare    Drug use: Yes     Types: Marijuana     Comment: medical has not used it in months     FH:   Family History   Problem Relation Age of Onset    Heart Attack Paternal Grandmother          Objective:   /68   Pulse 85   Temp 36.3 °C (97.3 °F) (Temporal)   Resp 20   Ht 1.803 m (5' 11\")   Wt 99.8 kg (220 lb)   SpO2 98%   BMI 30.68 kg/m²     Physical Exam  Vitals and nursing note reviewed.   Constitutional:       General: He is not in acute distress.     " Appearance: Normal appearance. He is normal weight. He is not ill-appearing or toxic-appearing.   HENT:      Head: Normocephalic.      Right Ear: External ear normal.      Left Ear: External ear normal.      Nose: Nose normal.      Mouth/Throat:      Mouth: Mucous membranes are moist.   Eyes:      General:         Right eye: No discharge.         Left eye: No discharge.      Extraocular Movements: Extraocular movements intact.      Conjunctiva/sclera: Conjunctivae normal.      Pupils: Pupils are equal, round, and reactive to light.   Pulmonary:      Effort: Pulmonary effort is normal.      Breath sounds: Normal breath sounds.   Abdominal:      General: Abdomen is flat.   Musculoskeletal:         General: Normal range of motion.      Cervical back: Normal range of motion and neck supple. No rigidity.        Feet:    Feet:      Comments: There is a darkened area of mild erythema surrounded by a larger area of erythema approximately 2 cm wide.  Skin is slightly boggy with palpation.  There is no pain with palpation or breakage in the skin.  Please see correlating picture.  Lymphadenopathy:      Cervical: No cervical adenopathy.   Skin:     General: Skin is warm and dry.   Neurological:      General: No focal deficit present.      Mental Status: He is alert and oriented to person, place, and time. Mental status is at baseline.   Psychiatric:         Mood and Affect: Mood normal.         Behavior: Behavior normal.         Judgment: Judgment normal.         Assessment/Plan:   Assessment    1. Acute pain of right foot          Fortunately, pain has resolved today.  Area of concerned is slightly boggy with palpation consistent with a blister.  Patient to continue with lotion and rest.  There is a similar area present to left foot.  Patient encouraged to wear different shoes as I am concerned that this may be causing a blister.  He is in agreement with plan of care.  Patient to return for worsening or persistent symptoms.

## 2025-05-29 ENCOUNTER — HOSPITAL ENCOUNTER (OUTPATIENT)
Dept: LAB | Facility: MEDICAL CENTER | Age: 70
End: 2025-05-29
Attending: FAMILY MEDICINE
Payer: MEDICARE

## 2025-05-29 LAB
ALBUMIN SERPL BCP-MCNC: 4.3 G/DL (ref 3.2–4.9)
ALBUMIN/GLOB SERPL: 1.4 G/DL
ALP SERPL-CCNC: 50 U/L (ref 30–99)
ALT SERPL-CCNC: 20 U/L (ref 2–50)
ANION GAP SERPL CALC-SCNC: 13 MMOL/L (ref 7–16)
AST SERPL-CCNC: 18 U/L (ref 12–45)
BASOPHILS # BLD AUTO: 1.2 % (ref 0–1.8)
BASOPHILS # BLD: 0.1 K/UL (ref 0–0.12)
BILIRUB SERPL-MCNC: 1.1 MG/DL (ref 0.1–1.5)
BUN SERPL-MCNC: 20 MG/DL (ref 8–22)
CALCIUM ALBUM COR SERPL-MCNC: 9 MG/DL (ref 8.5–10.5)
CALCIUM SERPL-MCNC: 9.2 MG/DL (ref 8.5–10.5)
CHLORIDE SERPL-SCNC: 106 MMOL/L (ref 96–112)
CHOLEST SERPL-MCNC: 139 MG/DL (ref 100–199)
CO2 SERPL-SCNC: 21 MMOL/L (ref 20–33)
CREAT SERPL-MCNC: 0.88 MG/DL (ref 0.5–1.4)
CRP SERPL HS-MCNC: <0.3 MG/DL (ref 0–0.75)
EOSINOPHIL # BLD AUTO: 0.22 K/UL (ref 0–0.51)
EOSINOPHIL NFR BLD: 2.6 % (ref 0–6.9)
ERYTHROCYTE [DISTWIDTH] IN BLOOD BY AUTOMATED COUNT: 48 FL (ref 35.9–50)
ERYTHROCYTE [SEDIMENTATION RATE] IN BLOOD BY WESTERGREN METHOD: 7 MM/HOUR (ref 0–20)
EST. AVERAGE GLUCOSE BLD GHB EST-MCNC: 171 MG/DL
FASTING STATUS PATIENT QL REPORTED: NORMAL
FOLATE SERPL-MCNC: 33.2 NG/ML
GFR SERPLBLD CREATININE-BSD FMLA CKD-EPI: 94 ML/MIN/1.73 M 2
GLOBULIN SER CALC-MCNC: 3.1 G/DL (ref 1.9–3.5)
GLUCOSE SERPL-MCNC: 137 MG/DL (ref 65–99)
HBA1C MFR BLD: 7.6 % (ref 4–5.6)
HCT VFR BLD AUTO: 43.6 % (ref 42–52)
HDLC SERPL-MCNC: 44 MG/DL
HGB BLD-MCNC: 14.3 G/DL (ref 14–18)
IMM GRANULOCYTES # BLD AUTO: 0.05 K/UL (ref 0–0.11)
IMM GRANULOCYTES NFR BLD AUTO: 0.6 % (ref 0–0.9)
LDLC SERPL CALC-MCNC: 75 MG/DL
LYMPHOCYTES # BLD AUTO: 3.22 K/UL (ref 1–4.8)
LYMPHOCYTES NFR BLD: 38.4 % (ref 22–41)
MCH RBC QN AUTO: 29.5 PG (ref 27–33)
MCHC RBC AUTO-ENTMCNC: 32.8 G/DL (ref 32.3–36.5)
MCV RBC AUTO: 90.1 FL (ref 81.4–97.8)
MONOCYTES # BLD AUTO: 0.7 K/UL (ref 0–0.85)
MONOCYTES NFR BLD AUTO: 8.4 % (ref 0–13.4)
NEUTROPHILS # BLD AUTO: 4.09 K/UL (ref 1.82–7.42)
NEUTROPHILS NFR BLD: 48.8 % (ref 44–72)
NRBC # BLD AUTO: 0 K/UL
NRBC BLD-RTO: 0 /100 WBC (ref 0–0.2)
PLATELET # BLD AUTO: 245 K/UL (ref 164–446)
PMV BLD AUTO: 10.5 FL (ref 9–12.9)
POTASSIUM SERPL-SCNC: 4.4 MMOL/L (ref 3.6–5.5)
PROT SERPL-MCNC: 7.4 G/DL (ref 6–8.2)
RBC # BLD AUTO: 4.84 M/UL (ref 4.7–6.1)
SODIUM SERPL-SCNC: 140 MMOL/L (ref 135–145)
TRIGL SERPL-MCNC: 100 MG/DL (ref 0–149)
TSH SERPL-ACNC: 4.06 UIU/ML (ref 0.38–5.33)
VIT B12 SERPL-MCNC: 640 PG/ML (ref 211–911)
WBC # BLD AUTO: 8.4 K/UL (ref 4.8–10.8)

## 2025-05-29 PROCEDURE — 80061 LIPID PANEL: CPT

## 2025-05-29 PROCEDURE — 85652 RBC SED RATE AUTOMATED: CPT

## 2025-05-29 PROCEDURE — 82570 ASSAY OF URINE CREATININE: CPT

## 2025-05-29 PROCEDURE — 86140 C-REACTIVE PROTEIN: CPT

## 2025-05-29 PROCEDURE — 82746 ASSAY OF FOLIC ACID SERUM: CPT

## 2025-05-29 PROCEDURE — 85025 COMPLETE CBC W/AUTO DIFF WBC: CPT

## 2025-05-29 PROCEDURE — 82043 UR ALBUMIN QUANTITATIVE: CPT

## 2025-05-29 PROCEDURE — 82607 VITAMIN B-12: CPT

## 2025-05-29 PROCEDURE — 80053 COMPREHEN METABOLIC PANEL: CPT

## 2025-05-29 PROCEDURE — 83036 HEMOGLOBIN GLYCOSYLATED A1C: CPT | Mod: GA

## 2025-05-29 PROCEDURE — 84443 ASSAY THYROID STIM HORMONE: CPT

## 2025-05-29 PROCEDURE — 36415 COLL VENOUS BLD VENIPUNCTURE: CPT

## 2025-05-31 LAB
COLLECT DURATION TIME SPEC: NORMAL HR
CREAT 24H UR-MCNC: 262 MG/DL
MICROALBUMIN 24H UR-MCNC: 1.1 MG/DL
MICROALBUMIN/CREAT 24H UR: 4 MG/G (ref 0–30)
SPECIMEN VOL ?TM UR: NORMAL ML

## 2025-07-25 DIAGNOSIS — I10 ESSENTIAL HYPERTENSION, BENIGN: ICD-10-CM

## 2025-07-28 DIAGNOSIS — I10 ESSENTIAL HYPERTENSION, BENIGN: ICD-10-CM

## 2025-07-28 RX ORDER — METOPROLOL SUCCINATE 25 MG/1
25 TABLET, EXTENDED RELEASE ORAL DAILY
Qty: 90 TABLET | Refills: 1 | OUTPATIENT
Start: 2025-07-28

## 2025-07-28 RX ORDER — LOSARTAN POTASSIUM 25 MG/1
25 TABLET ORAL DAILY
Qty: 90 TABLET | Refills: 1 | OUTPATIENT
Start: 2025-07-28

## 2025-07-29 RX ORDER — METOPROLOL SUCCINATE 25 MG/1
25 TABLET, EXTENDED RELEASE ORAL DAILY
Qty: 90 TABLET | Refills: 0 | Status: SHIPPED | OUTPATIENT
Start: 2025-07-29

## 2025-07-29 RX ORDER — LOSARTAN POTASSIUM 25 MG/1
25 TABLET ORAL DAILY
Qty: 90 TABLET | Refills: 0 | Status: SHIPPED | OUTPATIENT
Start: 2025-07-29

## 2025-07-29 NOTE — TELEPHONE ENCOUNTER
Schedulers: Please contact pt to schedule with JAISON for annual visit. NUVIA 5/13/2024    Thank you!

## 2025-07-29 NOTE — TELEPHONE ENCOUNTER
Is the patient due for a refill? Yes    Was the patient seen the last 15 months? Yes    Date of last office visit: 5/13/25    Does the patient have an upcoming appointment?  No    Provider to refill:JI    Does the patient have retirement Plus and need 100-day supply? (This applies to ALL medications) Patient does not have SCP

## 2025-08-12 ENCOUNTER — OFFICE VISIT (OUTPATIENT)
Facility: MEDICAL CENTER | Age: 70
End: 2025-08-12
Attending: INTERNAL MEDICINE
Payer: MEDICARE

## 2025-08-12 VITALS
WEIGHT: 215 LBS | HEIGHT: 71 IN | OXYGEN SATURATION: 95 % | RESPIRATION RATE: 16 BRPM | DIASTOLIC BLOOD PRESSURE: 72 MMHG | BODY MASS INDEX: 30.1 KG/M2 | SYSTOLIC BLOOD PRESSURE: 124 MMHG | HEART RATE: 96 BPM

## 2025-08-12 DIAGNOSIS — I10 ESSENTIAL HYPERTENSION, BENIGN: ICD-10-CM

## 2025-08-12 DIAGNOSIS — I25.10 CORONARY ARTERY DISEASE INVOLVING NATIVE CORONARY ARTERY OF NATIVE HEART WITHOUT ANGINA PECTORIS: Primary | Chronic | ICD-10-CM

## 2025-08-12 DIAGNOSIS — E78.5 DYSLIPIDEMIA: ICD-10-CM

## 2025-08-12 PROCEDURE — 99214 OFFICE O/P EST MOD 30 MIN: CPT | Performed by: INTERNAL MEDICINE

## 2025-08-12 PROCEDURE — 99212 OFFICE O/P EST SF 10 MIN: CPT | Performed by: INTERNAL MEDICINE

## 2025-08-12 PROCEDURE — 3078F DIAST BP <80 MM HG: CPT | Performed by: INTERNAL MEDICINE

## 2025-08-12 PROCEDURE — 3074F SYST BP LT 130 MM HG: CPT | Performed by: INTERNAL MEDICINE

## 2025-08-12 ASSESSMENT — ENCOUNTER SYMPTOMS
BRUISES/BLEEDS EASILY: 0
CONSTITUTIONAL NEGATIVE: 1
GASTROINTESTINAL NEGATIVE: 1
WEAKNESS: 0
FEVER: 0
MYALGIAS: 0
DOUBLE VISION: 0
HEADACHES: 0
EYES NEGATIVE: 1
ABDOMINAL PAIN: 0
DIZZINESS: 0
SHORTNESS OF BREATH: 0
PSYCHIATRIC NEGATIVE: 1
NERVOUS/ANXIOUS: 0
MUSCULOSKELETAL NEGATIVE: 1
CHILLS: 0
COUGH: 0
CARDIOVASCULAR NEGATIVE: 1
BLURRED VISION: 0
VOMITING: 0
CLAUDICATION: 0
WEIGHT LOSS: 0
DEPRESSION: 0
NAUSEA: 0
PALPITATIONS: 0
NEUROLOGICAL NEGATIVE: 1
RESPIRATORY NEGATIVE: 1
FOCAL WEAKNESS: 0

## 2025-08-12 ASSESSMENT — FIBROSIS 4 INDEX: FIB4 SCORE: 1.12

## (undated) DEVICE — CLOSURE SKIN STRIP 1/2 X 4 IN - (STERI STRIP) (50/BX 4BX/CA)

## (undated) DEVICE — SET EXTENSION WITH 2 PORTS (48EA/CA) ***PART #2C8610 IS A SUBSTITUTE*****

## (undated) DEVICE — SUTURE PASSER SELF CAPTURE

## (undated) DEVICE — GLOVE BIOGEL INDICATOR SZ 7SURGICAL PF LTX - (50/BX 4BX/CA)

## (undated) DEVICE — SHAVER 5.5 BRL FORMULA 12 FLUTE (5EA/BX)

## (undated) DEVICE — MASK ANESTHESIA ADULT  - (100/CA)

## (undated) DEVICE — KIT ROOM DECONTAMINATION

## (undated) DEVICE — BLOCK

## (undated) DEVICE — WATER IRRIGATION STERILE 1000ML (12EA/CA)

## (undated) DEVICE — DRAPETIBURON SHOULDER W/POUCH - (5EA/CA)

## (undated) DEVICE — SUTURE 2-0 VICRYL PLUS CT-1 36 (36PK/BX)"

## (undated) DEVICE — GLOVE SZ 7.5 LF PROTEXIS (50PR/BX)

## (undated) DEVICE — SYRINGE ASEPTO - (50EA/CA

## (undated) DEVICE — PACK SHOULDER ARTHROSCOPY SM - (2EA/CA)

## (undated) DEVICE — SENSOR OXIMETER ADULT SPO2 RD SET (20EA/BX)

## (undated) DEVICE — GLOVE, LITE (PAIR)

## (undated) DEVICE — GOWN WARMING STANDARD FLEX - (30/CA)

## (undated) DEVICE — SHAVER4.0 RESECTOR FORMULA - (5EA/BX)

## (undated) DEVICE — KIT ANESTHESIA W/CIRCUIT & 3/LT BAG W/FILTER (20EA/CA)

## (undated) DEVICE — DRAPE IOBAN II INCISE 23X17 - (10EA/BX 4BX/CA)

## (undated) DEVICE — HUMID-VENT HEAT AND MOISTURE EXCHANGE- (50/BX)

## (undated) DEVICE — GLOVE BIOGEL SZ 7.5 SURGICAL PF LTX - (50PR/BX 4BX/CA)

## (undated) DEVICE — PROTECTOR ULNA NERVE - (36PR/CA)

## (undated) DEVICE — GOWN SURGEONS X-LARGE - DISP. (30/CA)

## (undated) DEVICE — ABLATOR WAND SERFAS 90-S CRUISE

## (undated) DEVICE — SUTURE 3-0 MONOCRYL PLUS PS-2 - (12/BX)

## (undated) DEVICE — CANNULA TWIST IN 8.25MM X 7CM (5/BX)

## (undated) DEVICE — GLOVE BIOGEL PI ULTRATOUCH SZ 7.5 SURGICAL PF LF -(50/BX 4BX/CA)

## (undated) DEVICE — GLOVE BIOGEL PI INDICATOR SZ 7.0 SURGICAL PF LF - (50/BX 4BX/CA)

## (undated) DEVICE — PACK MAJOR ORTHO - (2EA/CA)

## (undated) DEVICE — SHAVER 5.5 RESECTOR FORMULA (5EA/BX )

## (undated) DEVICE — TUBING DAY USE W/CARTRIDGE (10EA/BX)

## (undated) DEVICE — CHLORAPREP 26 ML APPLICATOR - ORANGE TINT(25/CA)

## (undated) DEVICE — GAUZE FLUFF STERILE 2-PLY 36 X 36 (100EA/CA)

## (undated) DEVICE — BAG, SPONGE COUNT 50600

## (undated) DEVICE — GLOVE BIOGEL SZ 8 SURGICAL PF LTX - (50PR/BX 4BX/CA)

## (undated) DEVICE — HEAD HOLDER JUNIOR/ADULT

## (undated) DEVICE — TUBING PATIENT W/CONNECTOR REDEUCE (1EA)

## (undated) DEVICE — COVER LIGHT HANDLE FLEXIBLE - SOFT (2EA/PK 80PK/CA)

## (undated) DEVICE — SODIUM CHL. IRRIGATION 0.9% 3000ML (4EA/CA 65CA/PF)

## (undated) DEVICE — LACTATED RINGERS INJ 1000 ML - (14EA/CA 60CA/PF)

## (undated) DEVICE — Device

## (undated) DEVICE — SODIUM CHL IRRIGATION 0.9% 1000ML (12EA/CA)

## (undated) DEVICE — ARTHROWAND TURBOVAC 3.5/90 SCT

## (undated) DEVICE — SENSOR SPO2 NEO LNCS ADHESIVE (20/BX) SEE USER NOTES

## (undated) DEVICE — DRAPE LARGE 3 QUARTER - (20/CA)

## (undated) DEVICE — CANNULA FULLY THREADED 8 X 75 (5EA/BX)

## (undated) DEVICE — SUTURE 3-0 MONOCRYL PLUS PS-1 - 27 INCH (36/BX)

## (undated) DEVICE — SUTURE GENERAL

## (undated) DEVICE — TUBE E-T HI-LO CUFF 7.5MM (10EA/PK)

## (undated) DEVICE — CANISTER SUCTION RIGID RED 1500CC (40EA/CA)

## (undated) DEVICE — SPONGE GAUZE STER 4X4 8-PL - (2/PK 50PK/BX 12BX/CS)

## (undated) DEVICE — NEPTUNE 4 PORT MANIFOLD - (20/PK)

## (undated) DEVICE — PEROXIDE HYDROGEN 3% 32 OZ. (12EA/BX)

## (undated) DEVICE — SPIDER SHOULDER HOLDER (12EA/BX)

## (undated) DEVICE — ADHESIVE MASTISOL - (48/BX)

## (undated) DEVICE — GLOVE BIOGEL SZ 7 SURGICAL PF LTX - (50PR/BX 4BX/CA)

## (undated) DEVICE — TUBING CLEARLINK DUO-VENT - C-FLO (48EA/CA)

## (undated) DEVICE — DRAPE SURGICAL U 77X120 - (10/CA)

## (undated) DEVICE — PAD PREP 24 X 48 CUFFED - (100/CA)

## (undated) DEVICE — DRESSING 3X8 ADAPTIC GAUZE - NON-ADHERING (36/PK 6PK/BX)

## (undated) DEVICE — SUTURE 3-0 PROLENE PS-1 (12PK/BX)

## (undated) DEVICE — DRAPE U SPLIT IMP 54 X 76 - (24/CA)

## (undated) DEVICE — TUBING CASSETTE CROSSFLOW INTEGRATED (10EA/CA)

## (undated) DEVICE — PENCIL ELECTSURG 10FT HLSTR - WITH BLADE (50EA/CA)

## (undated) DEVICE — TOWEL STOP TIMEOUT SAFETY FLAG (40EA/CA)

## (undated) DEVICE — ELECTRODE DUAL RETURN W/ CORD - (50/PK)

## (undated) DEVICE — ELECTRODE 850 FOAM ADHESIVE - HYDROGEL RADIOTRNSPRNT (50/PK)

## (undated) DEVICE — BLADE SAGITTAL SAW 9.4MM X 25.5MM X .4MM FINE TOOTH (1/EA)

## (undated) DEVICE — TUBE CONNECTING SUCTION - CLEAR PLASTIC STERILE 72 IN (50EA/CA)

## (undated) DEVICE — SUCTION INSTRUMENT YANKAUER BULBOUS TIP W/O VENT (50EA/CA)

## (undated) DEVICE — TUBING PUMP WITH CONNECTOR REDEUCE (1EA)

## (undated) DEVICE — DRESSING XEROFORM 1X8 - (50/BX 4BX/CA)